# Patient Record
Sex: MALE | Race: WHITE | NOT HISPANIC OR LATINO | Employment: OTHER | URBAN - METROPOLITAN AREA
[De-identification: names, ages, dates, MRNs, and addresses within clinical notes are randomized per-mention and may not be internally consistent; named-entity substitution may affect disease eponyms.]

---

## 2023-09-15 ENCOUNTER — OFFICE VISIT (OUTPATIENT)
Dept: SLEEP MEDICINE | Facility: MEDICAL CENTER | Age: 78
End: 2023-09-15
Attending: INTERNAL MEDICINE
Payer: COMMERCIAL

## 2023-09-15 ENCOUNTER — HOSPITAL ENCOUNTER (OUTPATIENT)
Dept: RADIOLOGY | Facility: MEDICAL CENTER | Age: 78
End: 2023-09-15
Attending: INTERNAL MEDICINE

## 2023-09-15 VITALS
WEIGHT: 128 LBS | SYSTOLIC BLOOD PRESSURE: 108 MMHG | DIASTOLIC BLOOD PRESSURE: 54 MMHG | HEIGHT: 68 IN | OXYGEN SATURATION: 96 % | BODY MASS INDEX: 19.4 KG/M2 | HEART RATE: 60 BPM

## 2023-09-15 DIAGNOSIS — Z87.891 FORMER SMOKER: ICD-10-CM

## 2023-09-15 DIAGNOSIS — R91.1 LUNG NODULE: ICD-10-CM

## 2023-09-15 DIAGNOSIS — J96.11 CHRONIC RESPIRATORY FAILURE WITH HYPOXIA (HCC): ICD-10-CM

## 2023-09-15 PROBLEM — D61.818 PANCYTOPENIA (HCC): Chronic | Status: ACTIVE | Noted: 2023-09-15

## 2023-09-15 PROCEDURE — 3078F DIAST BP <80 MM HG: CPT | Performed by: INTERNAL MEDICINE

## 2023-09-15 PROCEDURE — 99211 OFF/OP EST MAY X REQ PHY/QHP: CPT | Performed by: INTERNAL MEDICINE

## 2023-09-15 PROCEDURE — 3074F SYST BP LT 130 MM HG: CPT | Performed by: INTERNAL MEDICINE

## 2023-09-15 PROCEDURE — 99205 OFFICE O/P NEW HI 60 MIN: CPT | Performed by: INTERNAL MEDICINE

## 2023-09-15 ASSESSMENT — PATIENT HEALTH QUESTIONNAIRE - PHQ9: CLINICAL INTERPRETATION OF PHQ2 SCORE: 0

## 2023-09-15 NOTE — PROGRESS NOTES
Pulmonary Consult    Date of Initial Consult: 9/15/23    Cc/ Reason for consult: Lung nodule, COPD with chronic hypoxic respiratory failure, former smoker, rheumatoid arthritis, h/o prostate cancer     HPI:   King Alegria is a very pleasant 78 y.o. male referred for above.    Patient has not been seen by pulmonary at [facility] in the past.  he has no prior PFTs  he has 0 ED or Urgent Care visits in the past year for respiratory issues.  he has not been hospitalized for respiratory issues.   he has not been intubated for respiratory failure.    He states he had a CT scan because he unintentionally lost 80lbs over 10 months.  He quit smoking 16 years ago.   He denies respiratory complaints.  His CT scan had a small 6 mm nodule Lung-RADS 3. Despite the low risk, a PET/CT was ordered.  PET/CT was negative.       Past Medical History:   Diagnosis Date    Arthritis     Chronic respiratory failure with hypoxia (HCC) 09/15/2023    Uses 2lpm at rest, 3lpm with ambulation and at night    COPD (chronic obstructive pulmonary disease) ([facility])     Former smoker 09/15/2023    Heart attack ([facility])     Heart attack ([facility])     Had stents placed last month    Hyperlipidemia     Hypertension     Lung nodule 09/15/2023    Pancytopenia ([facility]) 09/15/2023    Prostate cancer ([facility]) 2 years ago    Had seeds    Rheumatoid arthritis ([facility]) 12/15/2015       History reviewed. No pertinent surgical history.    Social History     Socioeconomic History    Marital status:      Spouse name: Not on file    Number of children: Not on file    Years of education: Not on file    Highest education level: Not on file   Occupational History    Not on file   Tobacco Use    Smoking status: Former     Current packs/day: 0.00     Average packs/day: 2.0 packs/day for 50.0 years (100.0 ttl pk-yrs)     Types: Cigarettes     Start date: 1963     Quit date: 2013     Years since quittin.7    Smokeless tobacco: Never   Substance and Sexual  Activity    Alcohol use: No     Comment: ETOH abue in the past    Drug use: No    Sexual activity: Not on file   Other Topics Concern    Not on file   Social History Narrative    Not on file     Social Determinants of Health     Financial Resource Strain: Not on file   Food Insecurity: Not on file   Transportation Needs: Not on file   Physical Activity: Not on file   Stress: Not on file   Social Connections: Not on file   Intimate Partner Violence: Not on file   Housing Stability: Not on file          Family History   Problem Relation Age of Onset    Cancer Mother        Current Outpatient Medications on File Prior to Visit   Medication Sig Dispense Refill    sulfaSALAzine (AZULFIDINE) 500 MG Tab Take 500 mg by mouth 4 times a day.      oxybutynin (DITROPAN) 5 MG Tab Take 5 mg by mouth 1 time daily as needed.      hydroxychloroquine (PLAQUENIL) 200 MG Tab Take 1 Tab by mouth 2 times a day. 180 Tab 1    ergocalciferol (DRISDOL) 79149 UNIT capsule Take 1 cap once weekly for 8 weeks. 4 Cap 1    predniSONE (DELTASONE) 5 MG Tab Take 3 tabs daily for 1 week and then 2 tabs daily for 1 week and then 1 tab daily for 1 week 60 Tab 1    folic acid (FOLVITE) 1 MG Tab Take 1 Tab by mouth every day. 30 Tab 3    ALBUTEROL INH Inhale  by mouth.      aspirin EC (ECOTRIN) 81 MG Tablet Delayed Response Take 81 mg by mouth every day.      atenolol (TENORMIN) 50 MG Tab Take 50 mg by mouth every day.      atorvastatin (LIPITOR) 80 MG tablet Take 80 mg by mouth every evening.      budesonide-formoterol (SYMBICORT) 160-4.5 MCG/ACT Aerosol Inhale 2 Puffs by mouth 2 Times a Day.      calcium-vitamin D (OSCAL 500 +D) 500-200 MG-UNIT Tab Take 1 Tab by mouth 2 times a day, with meals.      vitamin D (CHOLECALCIFEROL) 1000 UNIT Tab Take 1,000 Units by mouth every day.      clopidogrel (PLAVIX) 75 MG Tab Take 75 mg by mouth every day.      furosemide (LASIX) 20 MG Tab Take 20 mg by mouth 2 times a day.      isosorbide dinitrate (ISORDIL) 20 MG  "Tab Take 20 mg by mouth 3 times a day.      losartan (COZAAR) 50 MG Tab Take 50 mg by mouth every day.      tamsulosin (FLOMAX) 0.4 MG capsule Take 0.4 mg by mouth ONE-HALF HOUR AFTER BREAKFAST.       No current facility-administered medications on file prior to visit.       Allergies: Patient has no known allergies.      ROS: A 12 point ROS was performed on intake and during my interview. ROS negative unless specifically noted in HPI.     Vitals:  /54 (BP Location: Right arm, Patient Position: Sitting, BP Cuff Size: Adult)   Pulse 60   Ht 1.727 m (5' 8\")   Wt 58.1 kg (128 lb)   SpO2 96%     Wt Readings from Last 3 Encounters:   07/27/16 95.3 kg (210 lb)   04/19/16 97.5 kg (215 lb)   01/26/16 98 kg (216 lb)       Physical Exam:  Physical Exam  Vitals reviewed.   Constitutional:       General: He is not in acute distress.     Appearance: He is normal weight. He is not ill-appearing, toxic-appearing or diaphoretic.   Eyes:      General:         Right eye: No discharge.         Left eye: No discharge.      Conjunctiva/sclera: Conjunctivae normal.   Cardiovascular:      Rate and Rhythm: Normal rate and regular rhythm.   Pulmonary:      Effort: Pulmonary effort is normal.      Breath sounds: Normal breath sounds.   Musculoskeletal:      Right lower leg: No edema.      Left lower leg: No edema.   Skin:     General: Skin is warm.      Capillary Refill: Capillary refill takes less than 2 seconds.      Coloration: Skin is not jaundiced.   Neurological:      General: No focal deficit present.      Mental Status: He is alert.   Psychiatric:         Behavior: Behavior normal.         Laboratory Data: I personally reviewed labs including historical lab trends.       There is no immunization history on file for this patient.      PFTs as reviewed by me personally show: none available for review.     Imaging as reviewed by me personally show:  April 2023:      PET/CT August 2023:         Assessment/Plan:    Pulmonary " problem list:  #Lung nodule - low risk   #Tobacco abuse - remote; in remission  #Pancytopenia  #Weight loss    Discussion: This is a low risk nodule that does not require biopsy.  PET/CT is reassuring and that no other sites are involved.  Nodule is not visible on PET/CT plain CT images. No further f/u for the lung nodule is needed. He should continue [facility] until age 82 [facility]. He should also continue to have his pancytopenia and nonspecific bone marrow changes on PET/CT worked up by primary care or hematology oncology.     Total consult time was 60 minutes. This includes reviewing previous provider notes, personally reviewing previous imaging and spirometry, educating the patient about their disease process, and inhaler education.   __________  Jaime Wells, DO  Pulmonary and Critical Care Medicine  [facility]    Please note that this dictation was created using voice recognition software. The accuracy of the dictation is limited to the abilities of the software. I have made every reasonable attempt to correct obvious errors, but I expect that there are errors of grammar and possibly content that I did not discover before finalizing the note.

## 2023-11-08 ENCOUNTER — HOSPITAL ENCOUNTER (INPATIENT)
Facility: MEDICAL CENTER | Age: 78
LOS: 5 days | DRG: 121 | End: 2023-11-13
Attending: EMERGENCY MEDICINE | Admitting: INTERNAL MEDICINE
Payer: COMMERCIAL

## 2023-11-08 ENCOUNTER — APPOINTMENT (OUTPATIENT)
Dept: RADIOLOGY | Facility: MEDICAL CENTER | Age: 78
DRG: 121 | End: 2023-11-08
Attending: EMERGENCY MEDICINE
Payer: COMMERCIAL

## 2023-11-08 DIAGNOSIS — E07.9 THYROID ORBITOPATHY: ICD-10-CM

## 2023-11-08 DIAGNOSIS — H05.89 THYROID ORBITOPATHY: ICD-10-CM

## 2023-11-08 DIAGNOSIS — H05.123 ORBITAL MYOSITIS OF BOTH SIDES: ICD-10-CM

## 2023-11-08 DIAGNOSIS — H05.012 ORBITAL CELLULITIS ON LEFT: ICD-10-CM

## 2023-11-08 PROBLEM — H05.019 ORBITAL CELLULITIS: Status: ACTIVE | Noted: 2023-11-08

## 2023-11-08 LAB
ALBUMIN SERPL BCP-MCNC: 3.2 G/DL (ref 3.2–4.9)
ALBUMIN/GLOB SERPL: 0.7 G/DL
ALP SERPL-CCNC: 81 U/L (ref 30–99)
ALT SERPL-CCNC: 19 U/L (ref 2–50)
ANION GAP SERPL CALC-SCNC: 10 MMOL/L (ref 7–16)
ANISOCYTOSIS BLD QL SMEAR: ABNORMAL
AST SERPL-CCNC: 17 U/L (ref 12–45)
BASOPHILS # BLD AUTO: 0.2 % (ref 0–1.8)
BASOPHILS # BLD: 0.01 K/UL (ref 0–0.12)
BILIRUB SERPL-MCNC: 0.5 MG/DL (ref 0.1–1.5)
BUN SERPL-MCNC: 13 MG/DL (ref 8–22)
CALCIUM ALBUM COR SERPL-MCNC: 10 MG/DL (ref 8.5–10.5)
CALCIUM SERPL-MCNC: 9.4 MG/DL (ref 8.5–10.5)
CHLORIDE SERPL-SCNC: 98 MMOL/L (ref 96–112)
CK SERPL-CCNC: 39 U/L (ref 0–154)
CO2 SERPL-SCNC: 25 MMOL/L (ref 20–33)
COMMENT 1642: NORMAL
CREAT SERPL-MCNC: 0.63 MG/DL (ref 0.5–1.4)
CRP SERPL HS-MCNC: 5.08 MG/DL (ref 0–0.75)
EOSINOPHIL # BLD AUTO: 0 K/UL (ref 0–0.51)
EOSINOPHIL NFR BLD: 0 % (ref 0–6.9)
ERYTHROCYTE [DISTWIDTH] IN BLOOD BY AUTOMATED COUNT: 77.9 FL (ref 35.9–50)
ERYTHROCYTE [SEDIMENTATION RATE] IN BLOOD BY WESTERGREN METHOD: 91 MM/HOUR (ref 0–20)
GFR SERPLBLD CREATININE-BSD FMLA CKD-EPI: 98 ML/MIN/1.73 M 2
GLOBULIN SER CALC-MCNC: 4.8 G/DL (ref 1.9–3.5)
GLUCOSE SERPL-MCNC: 98 MG/DL (ref 65–99)
HCT VFR BLD AUTO: 24.9 % (ref 42–52)
HGB BLD-MCNC: 8.1 G/DL (ref 14–18)
IMM GRANULOCYTES # BLD AUTO: 0.06 K/UL (ref 0–0.11)
IMM GRANULOCYTES NFR BLD AUTO: 1.1 % (ref 0–0.9)
LACTATE SERPL-SCNC: 0.8 MMOL/L (ref 0.5–2)
LACTATE SERPL-SCNC: 1.9 MMOL/L (ref 0.5–2)
LYMPHOCYTES # BLD AUTO: 0.27 K/UL (ref 1–4.8)
LYMPHOCYTES NFR BLD: 4.9 % (ref 22–41)
MACROCYTES BLD QL SMEAR: ABNORMAL
MAGNESIUM SERPL-MCNC: 2 MG/DL (ref 1.5–2.5)
MCH RBC QN AUTO: 36 PG (ref 27–33)
MCHC RBC AUTO-ENTMCNC: 32.5 G/DL (ref 32.3–36.5)
MCV RBC AUTO: 110.7 FL (ref 81.4–97.8)
MONOCYTES # BLD AUTO: 0.47 K/UL (ref 0–0.85)
MONOCYTES NFR BLD AUTO: 8.6 % (ref 0–13.4)
MORPHOLOGY BLD-IMP: NORMAL
NEUTROPHILS # BLD AUTO: 4.68 K/UL (ref 1.82–7.42)
NEUTROPHILS NFR BLD: 85.2 % (ref 44–72)
NRBC # BLD AUTO: 0 K/UL
NRBC BLD-RTO: 0 /100 WBC (ref 0–0.2)
OVALOCYTES BLD QL SMEAR: NORMAL
PHOSPHATE SERPL-MCNC: 3 MG/DL (ref 2.5–4.5)
PLATELET # BLD AUTO: 109 K/UL (ref 164–446)
PLATELET BLD QL SMEAR: NORMAL
PMV BLD AUTO: 9.6 FL (ref 9–12.9)
POIKILOCYTOSIS BLD QL SMEAR: NORMAL
POTASSIUM SERPL-SCNC: 3.7 MMOL/L (ref 3.6–5.5)
PROT SERPL-MCNC: 8 G/DL (ref 6–8.2)
RBC # BLD AUTO: 2.25 M/UL (ref 4.7–6.1)
RBC BLD AUTO: PRESENT
SCCMEC + MECA PNL NOSE NAA+PROBE: NEGATIVE
SODIUM SERPL-SCNC: 133 MMOL/L (ref 135–145)
T3FREE SERPL-MCNC: 1.02 PG/ML (ref 2–4.4)
T4 FREE SERPL-MCNC: 0.63 NG/DL (ref 0.93–1.7)
TSH SERPL DL<=0.005 MIU/L-ACNC: 57.2 UIU/ML (ref 0.38–5.33)
WBC # BLD AUTO: 5.5 K/UL (ref 4.8–10.8)

## 2023-11-08 PROCEDURE — 84439 ASSAY OF FREE THYROXINE: CPT

## 2023-11-08 PROCEDURE — 87040 BLOOD CULTURE FOR BACTERIA: CPT | Mod: 91

## 2023-11-08 PROCEDURE — 80053 COMPREHEN METABOLIC PANEL: CPT

## 2023-11-08 PROCEDURE — A9270 NON-COVERED ITEM OR SERVICE: HCPCS | Performed by: INTERNAL MEDICINE

## 2023-11-08 PROCEDURE — 85025 COMPLETE CBC W/AUTO DIFF WBC: CPT

## 2023-11-08 PROCEDURE — 700117 HCHG RX CONTRAST REV CODE 255: Performed by: EMERGENCY MEDICINE

## 2023-11-08 PROCEDURE — 96366 THER/PROPH/DIAG IV INF ADDON: CPT

## 2023-11-08 PROCEDURE — 700102 HCHG RX REV CODE 250 W/ 637 OVERRIDE(OP): Performed by: INTERNAL MEDICINE

## 2023-11-08 PROCEDURE — 770006 HCHG ROOM/CARE - MED/SURG/GYN SEMI*

## 2023-11-08 PROCEDURE — 99285 EMERGENCY DEPT VISIT HI MDM: CPT

## 2023-11-08 PROCEDURE — 70481 CT ORBIT/EAR/FOSSA W/DYE: CPT

## 2023-11-08 PROCEDURE — 87641 MR-STAPH DNA AMP PROBE: CPT

## 2023-11-08 PROCEDURE — 99223 1ST HOSP IP/OBS HIGH 75: CPT | Mod: AI | Performed by: INTERNAL MEDICINE

## 2023-11-08 PROCEDURE — 83735 ASSAY OF MAGNESIUM: CPT

## 2023-11-08 PROCEDURE — 36415 COLL VENOUS BLD VENIPUNCTURE: CPT

## 2023-11-08 PROCEDURE — 85652 RBC SED RATE AUTOMATED: CPT

## 2023-11-08 PROCEDURE — 70470 CT HEAD/BRAIN W/O & W/DYE: CPT

## 2023-11-08 PROCEDURE — 82550 ASSAY OF CK (CPK): CPT

## 2023-11-08 PROCEDURE — 96365 THER/PROPH/DIAG IV INF INIT: CPT

## 2023-11-08 PROCEDURE — 96375 TX/PRO/DX INJ NEW DRUG ADDON: CPT

## 2023-11-08 PROCEDURE — 84100 ASSAY OF PHOSPHORUS: CPT

## 2023-11-08 PROCEDURE — 83605 ASSAY OF LACTIC ACID: CPT | Mod: 91

## 2023-11-08 PROCEDURE — 84481 FREE ASSAY (FT-3): CPT

## 2023-11-08 PROCEDURE — 700111 HCHG RX REV CODE 636 W/ 250 OVERRIDE (IP): Mod: JZ | Performed by: INTERNAL MEDICINE

## 2023-11-08 PROCEDURE — 700105 HCHG RX REV CODE 258: Performed by: EMERGENCY MEDICINE

## 2023-11-08 PROCEDURE — 700101 HCHG RX REV CODE 250: Performed by: EMERGENCY MEDICINE

## 2023-11-08 PROCEDURE — 86140 C-REACTIVE PROTEIN: CPT

## 2023-11-08 PROCEDURE — 84443 ASSAY THYROID STIM HORMONE: CPT

## 2023-11-08 PROCEDURE — 700111 HCHG RX REV CODE 636 W/ 250 OVERRIDE (IP): Mod: JZ | Performed by: EMERGENCY MEDICINE

## 2023-11-08 RX ORDER — OXYBUTYNIN CHLORIDE 5 MG/1
5 TABLET ORAL
Status: DISCONTINUED | OUTPATIENT
Start: 2023-11-08 | End: 2023-11-13 | Stop reason: HOSPADM

## 2023-11-08 RX ORDER — LANOLIN ALCOHOL/MO/W.PET/CERES
1000 CREAM (GRAM) TOPICAL EVERY MORNING
COMMUNITY
End: 2023-12-27

## 2023-11-08 RX ORDER — MORPHINE SULFATE 4 MG/ML
4 INJECTION INTRAVENOUS
Status: DISCONTINUED | OUTPATIENT
Start: 2023-11-08 | End: 2023-11-08

## 2023-11-08 RX ORDER — AMOXICILLIN 250 MG
2 CAPSULE ORAL 2 TIMES DAILY
Status: DISCONTINUED | OUTPATIENT
Start: 2023-11-09 | End: 2023-11-13 | Stop reason: HOSPADM

## 2023-11-08 RX ORDER — ATORVASTATIN CALCIUM 40 MG/1
80 TABLET, FILM COATED ORAL NIGHTLY
Status: DISCONTINUED | OUTPATIENT
Start: 2023-11-08 | End: 2023-11-13 | Stop reason: HOSPADM

## 2023-11-08 RX ORDER — CEFEPIME HYDROCHLORIDE 2 G/1
2 INJECTION, POWDER, FOR SOLUTION INTRAVENOUS ONCE
Status: COMPLETED | OUTPATIENT
Start: 2023-11-08 | End: 2023-11-08

## 2023-11-08 RX ORDER — ISOSORBIDE MONONITRATE 60 MG/1
60 TABLET, EXTENDED RELEASE ORAL EVERY MORNING
COMMUNITY

## 2023-11-08 RX ORDER — POTASSIUM CHLORIDE 20 MEQ/1
20 TABLET, EXTENDED RELEASE ORAL 2 TIMES DAILY
COMMUNITY

## 2023-11-08 RX ORDER — CEFTRIAXONE 1 G/1
1000 INJECTION, POWDER, FOR SOLUTION INTRAMUSCULAR; INTRAVENOUS ONCE
Status: DISCONTINUED | OUTPATIENT
Start: 2023-11-08 | End: 2023-11-08

## 2023-11-08 RX ORDER — CETIRIZINE HYDROCHLORIDE 10 MG/1
10 TABLET ORAL EVERY MORNING
Status: DISCONTINUED | OUTPATIENT
Start: 2023-11-09 | End: 2023-11-13 | Stop reason: HOSPADM

## 2023-11-08 RX ORDER — LEVOTHYROXINE SODIUM 0.12 MG/1
125 TABLET ORAL
Status: ON HOLD | COMMUNITY
End: 2023-11-13

## 2023-11-08 RX ORDER — ATENOLOL 25 MG/1
12.5 TABLET ORAL
Status: DISCONTINUED | OUTPATIENT
Start: 2023-11-09 | End: 2023-11-13 | Stop reason: HOSPADM

## 2023-11-08 RX ORDER — DOCUSATE CALCIUM 240 MG
240 CAPSULE ORAL
COMMUNITY

## 2023-11-08 RX ORDER — VITAMIN B COMPLEX
1000 TABLET ORAL DAILY
Status: DISCONTINUED | OUTPATIENT
Start: 2023-11-09 | End: 2023-11-13 | Stop reason: HOSPADM

## 2023-11-08 RX ORDER — ISOSORBIDE MONONITRATE 30 MG/1
60 TABLET, EXTENDED RELEASE ORAL EVERY MORNING
Status: DISCONTINUED | OUTPATIENT
Start: 2023-11-09 | End: 2023-11-13 | Stop reason: HOSPADM

## 2023-11-08 RX ORDER — LABETALOL HYDROCHLORIDE 5 MG/ML
10 INJECTION, SOLUTION INTRAVENOUS EVERY 4 HOURS PRN
Status: DISCONTINUED | OUTPATIENT
Start: 2023-11-08 | End: 2023-11-13 | Stop reason: HOSPADM

## 2023-11-08 RX ORDER — HYDROMORPHONE HYDROCHLORIDE 1 MG/ML
1 INJECTION, SOLUTION INTRAMUSCULAR; INTRAVENOUS; SUBCUTANEOUS
Status: DISCONTINUED | OUTPATIENT
Start: 2023-11-08 | End: 2023-11-09

## 2023-11-08 RX ORDER — ONDANSETRON 2 MG/ML
4 INJECTION INTRAMUSCULAR; INTRAVENOUS EVERY 4 HOURS PRN
Status: DISCONTINUED | OUTPATIENT
Start: 2023-11-08 | End: 2023-11-13 | Stop reason: HOSPADM

## 2023-11-08 RX ORDER — TAMSULOSIN HYDROCHLORIDE 0.4 MG/1
0.4 CAPSULE ORAL EVERY MORNING
Status: DISCONTINUED | OUTPATIENT
Start: 2023-11-09 | End: 2023-11-13 | Stop reason: HOSPADM

## 2023-11-08 RX ORDER — ASCORBIC ACID 500 MG
500 TABLET ORAL EVERY MORNING
Status: DISCONTINUED | OUTPATIENT
Start: 2023-11-09 | End: 2023-11-13 | Stop reason: HOSPADM

## 2023-11-08 RX ORDER — PROPARACAINE HYDROCHLORIDE 5 MG/ML
1 SOLUTION/ DROPS OPHTHALMIC ONCE
Status: COMPLETED | OUTPATIENT
Start: 2023-11-08 | End: 2023-11-08

## 2023-11-08 RX ORDER — FERROUS SULFATE 325(65) MG
325 TABLET ORAL
COMMUNITY
End: 2023-12-27

## 2023-11-08 RX ORDER — ASCORBIC ACID 500 MG
500 TABLET ORAL EVERY MORNING
COMMUNITY
End: 2023-12-27

## 2023-11-08 RX ORDER — POTASSIUM CHLORIDE 20 MEQ/1
20 TABLET, EXTENDED RELEASE ORAL DAILY
Status: DISCONTINUED | OUTPATIENT
Start: 2023-11-09 | End: 2023-11-13 | Stop reason: HOSPADM

## 2023-11-08 RX ORDER — ACETAMINOPHEN 325 MG/1
650 TABLET ORAL EVERY 6 HOURS PRN
Status: DISCONTINUED | OUTPATIENT
Start: 2023-11-08 | End: 2023-11-13 | Stop reason: HOSPADM

## 2023-11-08 RX ORDER — ENOXAPARIN SODIUM 100 MG/ML
40 INJECTION SUBCUTANEOUS DAILY
Status: DISCONTINUED | OUTPATIENT
Start: 2023-11-08 | End: 2023-11-13 | Stop reason: HOSPADM

## 2023-11-08 RX ORDER — BISACODYL 10 MG
10 SUPPOSITORY, RECTAL RECTAL
Status: DISCONTINUED | OUTPATIENT
Start: 2023-11-08 | End: 2023-11-13 | Stop reason: HOSPADM

## 2023-11-08 RX ORDER — POLYETHYLENE GLYCOL 3350 17 G/17G
1 POWDER, FOR SOLUTION ORAL
Status: DISCONTINUED | OUTPATIENT
Start: 2023-11-08 | End: 2023-11-13 | Stop reason: HOSPADM

## 2023-11-08 RX ORDER — LEVOTHYROXINE SODIUM 0.15 MG/1
150 TABLET ORAL
Status: DISCONTINUED | OUTPATIENT
Start: 2023-11-09 | End: 2023-11-13 | Stop reason: HOSPADM

## 2023-11-08 RX ORDER — PYRIDOXINE HCL (VITAMIN B6) 50 MG
50 TABLET ORAL EVERY MORNING
COMMUNITY
End: 2023-12-27

## 2023-11-08 RX ORDER — MORPHINE SULFATE 4 MG/ML
4 INJECTION INTRAVENOUS
Status: DISCONTINUED | OUTPATIENT
Start: 2023-11-08 | End: 2023-11-09

## 2023-11-08 RX ORDER — ONDANSETRON 4 MG/1
4 TABLET, ORALLY DISINTEGRATING ORAL EVERY 4 HOURS PRN
Status: DISCONTINUED | OUTPATIENT
Start: 2023-11-08 | End: 2023-11-13 | Stop reason: HOSPADM

## 2023-11-08 RX ORDER — PYRIDOXINE HCL (VITAMIN B6) 50 MG
50 TABLET ORAL EVERY MORNING
Status: DISCONTINUED | OUTPATIENT
Start: 2023-11-09 | End: 2023-11-13 | Stop reason: HOSPADM

## 2023-11-08 RX ORDER — OXYCODONE HYDROCHLORIDE 10 MG/1
10 TABLET ORAL
Status: DISCONTINUED | OUTPATIENT
Start: 2023-11-08 | End: 2023-11-09

## 2023-11-08 RX ORDER — CHOLECALCIFEROL (VITAMIN D3) 125 MCG
1000 CAPSULE ORAL EVERY MORNING
Status: DISCONTINUED | OUTPATIENT
Start: 2023-11-09 | End: 2023-11-13 | Stop reason: HOSPADM

## 2023-11-08 RX ORDER — ASPIRIN 81 MG/1
81 TABLET ORAL
Status: DISCONTINUED | OUTPATIENT
Start: 2023-11-08 | End: 2023-11-13 | Stop reason: HOSPADM

## 2023-11-08 RX ORDER — FERROUS SULFATE 325(65) MG
325 TABLET ORAL
Status: DISCONTINUED | OUTPATIENT
Start: 2023-11-08 | End: 2023-11-13 | Stop reason: HOSPADM

## 2023-11-08 RX ORDER — OXYCODONE HYDROCHLORIDE 5 MG/1
5 TABLET ORAL
Status: DISCONTINUED | OUTPATIENT
Start: 2023-11-08 | End: 2023-11-11

## 2023-11-08 RX ORDER — SODIUM CHLORIDE, SODIUM LACTATE, POTASSIUM CHLORIDE, AND CALCIUM CHLORIDE .6; .31; .03; .02 G/100ML; G/100ML; G/100ML; G/100ML
30 INJECTION, SOLUTION INTRAVENOUS ONCE
Status: COMPLETED | OUTPATIENT
Start: 2023-11-08 | End: 2023-11-08

## 2023-11-08 RX ORDER — CETIRIZINE HYDROCHLORIDE 10 MG/1
10 TABLET ORAL EVERY MORNING
COMMUNITY

## 2023-11-08 RX ADMIN — VANCOMYCIN HYDROCHLORIDE 1500 MG: 5 INJECTION, POWDER, LYOPHILIZED, FOR SOLUTION INTRAVENOUS at 20:27

## 2023-11-08 RX ADMIN — CEFEPIME 2 G: 2 INJECTION, POWDER, FOR SOLUTION INTRAVENOUS at 20:14

## 2023-11-08 RX ADMIN — SODIUM CHLORIDE, POTASSIUM CHLORIDE, SODIUM LACTATE AND CALCIUM CHLORIDE 1716 ML: 600; 310; 30; 20 INJECTION, SOLUTION INTRAVENOUS at 19:11

## 2023-11-08 RX ADMIN — PROPARACAINE HYDROCHLORIDE 1 DROP: 5 SOLUTION/ DROPS OPHTHALMIC at 23:08

## 2023-11-08 RX ADMIN — ASPIRIN 81 MG: 81 TABLET, COATED ORAL at 23:49

## 2023-11-08 RX ADMIN — ENOXAPARIN SODIUM 40 MG: 100 INJECTION SUBCUTANEOUS at 23:49

## 2023-11-08 RX ADMIN — IOHEXOL 80 ML: 350 INJECTION, SOLUTION INTRAVENOUS at 21:15

## 2023-11-08 RX ADMIN — MORPHINE SULFATE 4 MG: 4 INJECTION, SOLUTION INTRAMUSCULAR; INTRAVENOUS at 19:11

## 2023-11-09 ENCOUNTER — APPOINTMENT (OUTPATIENT)
Dept: RADIOLOGY | Facility: MEDICAL CENTER | Age: 78
DRG: 121 | End: 2023-11-09
Attending: STUDENT IN AN ORGANIZED HEALTH CARE EDUCATION/TRAINING PROGRAM
Payer: COMMERCIAL

## 2023-11-09 PROBLEM — D61.9 APLASTIC ANEMIA (HCC): Status: ACTIVE | Noted: 2023-11-09

## 2023-11-09 PROBLEM — D69.6 THROMBOCYTOPENIA (HCC): Status: ACTIVE | Noted: 2023-11-09

## 2023-11-09 PROBLEM — D64.9 CHRONIC ANEMIA: Status: ACTIVE | Noted: 2023-11-09

## 2023-11-09 PROBLEM — H05.89 THYROID ORBITOPATHY: Status: ACTIVE | Noted: 2023-11-09

## 2023-11-09 PROBLEM — E07.9 THYROID ORBITOPATHY: Status: ACTIVE | Noted: 2023-11-09

## 2023-11-09 PROBLEM — E03.9 HYPOTHYROIDISM: Status: ACTIVE | Noted: 2023-11-09

## 2023-11-09 LAB
ALBUMIN SERPL BCP-MCNC: 3.1 G/DL (ref 3.2–4.9)
ALBUMIN/GLOB SERPL: 0.8 G/DL
ALP SERPL-CCNC: 81 U/L (ref 30–99)
ALT SERPL-CCNC: 15 U/L (ref 2–50)
ANION GAP SERPL CALC-SCNC: 8 MMOL/L (ref 7–16)
AST SERPL-CCNC: 13 U/L (ref 12–45)
BASOPHILS # BLD AUTO: 0.2 % (ref 0–1.8)
BASOPHILS # BLD: 0.01 K/UL (ref 0–0.12)
BILIRUB SERPL-MCNC: 0.6 MG/DL (ref 0.1–1.5)
BUN SERPL-MCNC: 10 MG/DL (ref 8–22)
CALCIUM ALBUM COR SERPL-MCNC: 9.4 MG/DL (ref 8.5–10.5)
CALCIUM SERPL-MCNC: 8.7 MG/DL (ref 8.5–10.5)
CHLORIDE SERPL-SCNC: 102 MMOL/L (ref 96–112)
CO2 SERPL-SCNC: 26 MMOL/L (ref 20–33)
CREAT SERPL-MCNC: 0.58 MG/DL (ref 0.5–1.4)
EOSINOPHIL # BLD AUTO: 0.01 K/UL (ref 0–0.51)
EOSINOPHIL NFR BLD: 0.2 % (ref 0–6.9)
ERYTHROCYTE [DISTWIDTH] IN BLOOD BY AUTOMATED COUNT: 77.1 FL (ref 35.9–50)
GFR SERPLBLD CREATININE-BSD FMLA CKD-EPI: 101 ML/MIN/1.73 M 2
GLOBULIN SER CALC-MCNC: 4 G/DL (ref 1.9–3.5)
GLUCOSE SERPL-MCNC: 90 MG/DL (ref 65–99)
HCT VFR BLD AUTO: 25 % (ref 42–52)
HGB BLD-MCNC: 8 G/DL (ref 14–18)
IMM GRANULOCYTES # BLD AUTO: 0.06 K/UL (ref 0–0.11)
IMM GRANULOCYTES NFR BLD AUTO: 1.1 % (ref 0–0.9)
LYMPHOCYTES # BLD AUTO: 0.25 K/UL (ref 1–4.8)
LYMPHOCYTES NFR BLD: 4.7 % (ref 22–41)
MCH RBC QN AUTO: 35.6 PG (ref 27–33)
MCHC RBC AUTO-ENTMCNC: 32 G/DL (ref 32.3–36.5)
MCV RBC AUTO: 111.1 FL (ref 81.4–97.8)
MONOCYTES # BLD AUTO: 0.48 K/UL (ref 0–0.85)
MONOCYTES NFR BLD AUTO: 9 % (ref 0–13.4)
NEUTROPHILS # BLD AUTO: 4.53 K/UL (ref 1.82–7.42)
NEUTROPHILS NFR BLD: 84.8 % (ref 44–72)
NRBC # BLD AUTO: 0 K/UL
NRBC BLD-RTO: 0 /100 WBC (ref 0–0.2)
PLATELET # BLD AUTO: 118 K/UL (ref 164–446)
PLATELETS.RETICULATED NFR BLD AUTO: 1.8 % (ref 0.6–13.1)
PMV BLD AUTO: 9.6 FL (ref 9–12.9)
POTASSIUM SERPL-SCNC: 3.6 MMOL/L (ref 3.6–5.5)
PROT SERPL-MCNC: 7.1 G/DL (ref 6–8.2)
RBC # BLD AUTO: 2.25 M/UL (ref 4.7–6.1)
SODIUM SERPL-SCNC: 136 MMOL/L (ref 135–145)
THYROPEROXIDASE AB SERPL-ACNC: 23 IU/ML (ref 0–9)
WBC # BLD AUTO: 5.3 K/UL (ref 4.8–10.8)

## 2023-11-09 PROCEDURE — 99223 1ST HOSP IP/OBS HIGH 75: CPT | Performed by: OPHTHALMOLOGY

## 2023-11-09 PROCEDURE — 99233 SBSQ HOSP IP/OBS HIGH 50: CPT | Performed by: STUDENT IN AN ORGANIZED HEALTH CARE EDUCATION/TRAINING PROGRAM

## 2023-11-09 PROCEDURE — 700105 HCHG RX REV CODE 258: Performed by: STUDENT IN AN ORGANIZED HEALTH CARE EDUCATION/TRAINING PROGRAM

## 2023-11-09 PROCEDURE — 700111 HCHG RX REV CODE 636 W/ 250 OVERRIDE (IP): Performed by: STUDENT IN AN ORGANIZED HEALTH CARE EDUCATION/TRAINING PROGRAM

## 2023-11-09 PROCEDURE — 85025 COMPLETE CBC W/AUTO DIFF WBC: CPT

## 2023-11-09 PROCEDURE — 36415 COLL VENOUS BLD VENIPUNCTURE: CPT

## 2023-11-09 PROCEDURE — 770006 HCHG ROOM/CARE - MED/SURG/GYN SEMI*

## 2023-11-09 PROCEDURE — 700102 HCHG RX REV CODE 250 W/ 637 OVERRIDE(OP): Performed by: INTERNAL MEDICINE

## 2023-11-09 PROCEDURE — A9579 GAD-BASE MR CONTRAST NOS,1ML: HCPCS | Performed by: STUDENT IN AN ORGANIZED HEALTH CARE EDUCATION/TRAINING PROGRAM

## 2023-11-09 PROCEDURE — 700105 HCHG RX REV CODE 258: Performed by: INTERNAL MEDICINE

## 2023-11-09 PROCEDURE — 83520 IMMUNOASSAY QUANT NOS NONAB: CPT

## 2023-11-09 PROCEDURE — 80053 COMPREHEN METABOLIC PANEL: CPT

## 2023-11-09 PROCEDURE — 700111 HCHG RX REV CODE 636 W/ 250 OVERRIDE (IP): Performed by: INTERNAL MEDICINE

## 2023-11-09 PROCEDURE — A9270 NON-COVERED ITEM OR SERVICE: HCPCS | Performed by: INTERNAL MEDICINE

## 2023-11-09 PROCEDURE — 85055 RETICULATED PLATELET ASSAY: CPT

## 2023-11-09 PROCEDURE — 70543 MRI ORBT/FAC/NCK W/O &W/DYE: CPT

## 2023-11-09 PROCEDURE — 700117 HCHG RX CONTRAST REV CODE 255: Performed by: STUDENT IN AN ORGANIZED HEALTH CARE EDUCATION/TRAINING PROGRAM

## 2023-11-09 PROCEDURE — 86376 MICROSOMAL ANTIBODY EACH: CPT

## 2023-11-09 RX ADMIN — Medication 500 MG: at 06:08

## 2023-11-09 RX ADMIN — FERROUS SULFATE TAB 325 MG (65 MG ELEMENTAL FE) 325 MG: 325 (65 FE) TAB at 00:37

## 2023-11-09 RX ADMIN — CYANOCOBALAMIN TAB 500 MCG 1000 MCG: 500 TAB at 06:08

## 2023-11-09 RX ADMIN — ATORVASTATIN CALCIUM 80 MG: 80 TABLET, FILM COATED ORAL at 00:36

## 2023-11-09 RX ADMIN — ASPIRIN 81 MG: 81 TABLET, COATED ORAL at 20:43

## 2023-11-09 RX ADMIN — CETIRIZINE HYDROCHLORIDE 10 MG: 10 TABLET, FILM COATED ORAL at 06:08

## 2023-11-09 RX ADMIN — CEFTRIAXONE SODIUM 2000 MG: 10 INJECTION, POWDER, FOR SOLUTION INTRAVENOUS at 06:36

## 2023-11-09 RX ADMIN — LEVOTHYROXINE SODIUM 150 MCG: 0.15 TABLET ORAL at 06:08

## 2023-11-09 RX ADMIN — ENOXAPARIN SODIUM 40 MG: 100 INJECTION SUBCUTANEOUS at 18:48

## 2023-11-09 RX ADMIN — METHYLPREDNISOLONE SODIUM SUCCINATE 1000 MG: 1 INJECTION, POWDER, LYOPHILIZED, FOR SOLUTION INTRAMUSCULAR; INTRAVENOUS at 15:43

## 2023-11-09 RX ADMIN — GADOTERIDOL 10 ML: 279.3 INJECTION, SOLUTION INTRAVENOUS at 22:24

## 2023-11-09 RX ADMIN — OXYCODONE HYDROCHLORIDE 10 MG: 10 TABLET ORAL at 02:48

## 2023-11-09 RX ADMIN — ATENOLOL 12.5 MG: 25 TABLET ORAL at 06:07

## 2023-11-09 RX ADMIN — VANCOMYCIN HYDROCHLORIDE 1000 MG: 5 INJECTION, POWDER, LYOPHILIZED, FOR SOLUTION INTRAVENOUS at 23:07

## 2023-11-09 RX ADMIN — OXYBUTYNIN CHLORIDE 5 MG: 5 TABLET ORAL at 20:43

## 2023-11-09 RX ADMIN — OXYBUTYNIN CHLORIDE 5 MG: 5 TABLET ORAL at 00:37

## 2023-11-09 RX ADMIN — ATORVASTATIN CALCIUM 80 MG: 80 TABLET, FILM COATED ORAL at 20:43

## 2023-11-09 RX ADMIN — TAMSULOSIN HYDROCHLORIDE 0.4 MG: 0.4 CAPSULE ORAL at 06:08

## 2023-11-09 RX ADMIN — PYRIDOXINE HCL TAB 50 MG 50 MG: 50 TAB at 06:08

## 2023-11-09 RX ADMIN — Medication 1000 UNITS: at 06:07

## 2023-11-09 RX ADMIN — POTASSIUM CHLORIDE 20 MEQ: 1500 TABLET, EXTENDED RELEASE ORAL at 06:08

## 2023-11-09 RX ADMIN — ISOSORBIDE MONONITRATE 60 MG: 30 TABLET, EXTENDED RELEASE ORAL at 06:07

## 2023-11-09 RX ADMIN — DOCUSATE SODIUM 50 MG AND SENNOSIDES 8.6 MG 2 TABLET: 8.6; 5 TABLET, FILM COATED ORAL at 18:48

## 2023-11-09 RX ADMIN — FERROUS SULFATE TAB 325 MG (65 MG ELEMENTAL FE) 325 MG: 325 (65 FE) TAB at 20:43

## 2023-11-09 ASSESSMENT — ENCOUNTER SYMPTOMS
CONSTITUTIONAL NEGATIVE: 1
BRUISES/BLEEDS EASILY: 0
EYE DISCHARGE: 1
EYE REDNESS: 1
MUSCULOSKELETAL NEGATIVE: 1
DOUBLE VISION: 0
WEIGHT LOSS: 0
HEMOPTYSIS: 0
RESPIRATORY NEGATIVE: 1
SPUTUM PRODUCTION: 0
ORTHOPNEA: 0
FLANK PAIN: 0
FOCAL WEAKNESS: 0
NERVOUS/ANXIOUS: 0
PALPITATIONS: 0
NEUROLOGICAL NEGATIVE: 1
NAUSEA: 0
BLURRED VISION: 1
CARDIOVASCULAR NEGATIVE: 1
DOUBLE VISION: 1
HEADACHES: 0
NECK PAIN: 0
EYE PAIN: 1
SPEECH CHANGE: 0
POLYDIPSIA: 0
BACK PAIN: 0
GASTROINTESTINAL NEGATIVE: 1
HEARTBURN: 0
FEVER: 0
BLURRED VISION: 0
COUGH: 0
PHOTOPHOBIA: 0
CHILLS: 0
HALLUCINATIONS: 0
PSYCHIATRIC NEGATIVE: 1
VOMITING: 0
TREMORS: 0
COUGH: 1

## 2023-11-09 ASSESSMENT — LIFESTYLE VARIABLES
CONSUMPTION TOTAL: NEGATIVE
AVERAGE NUMBER OF DAYS PER WEEK YOU HAVE A DRINK CONTAINING ALCOHOL: 0
ALCOHOL_USE: NO
TOTAL SCORE: 0
EVER HAD A DRINK FIRST THING IN THE MORNING TO STEADY YOUR NERVES TO GET RID OF A HANGOVER: NO
TOTAL SCORE: 0
ON A TYPICAL DAY WHEN YOU DRINK ALCOHOL HOW MANY DRINKS DO YOU HAVE: 0
HOW MANY TIMES IN THE PAST YEAR HAVE YOU HAD 5 OR MORE DRINKS IN A DAY: 0
TOTAL SCORE: 0
HAVE YOU EVER FELT YOU SHOULD CUT DOWN ON YOUR DRINKING: NO
SUBSTANCE_ABUSE: 0
HAVE PEOPLE ANNOYED YOU BY CRITICIZING YOUR DRINKING: NO
EVER FELT BAD OR GUILTY ABOUT YOUR DRINKING: NO

## 2023-11-09 ASSESSMENT — COGNITIVE AND FUNCTIONAL STATUS - GENERAL
MOBILITY SCORE: 24
SUGGESTED CMS G CODE MODIFIER DAILY ACTIVITY: CH
SUGGESTED CMS G CODE MODIFIER MOBILITY: CH
DAILY ACTIVITIY SCORE: 24

## 2023-11-09 ASSESSMENT — PAIN DESCRIPTION - PAIN TYPE: TYPE: ACUTE PAIN

## 2023-11-09 NOTE — ASSESSMENT & PLAN NOTE
Uncontrolled with TSH 57.2, likely secondary to noncompliance.  As per patient, he missed a few days of levothyroxine.  Will increase dose of levothyroxine from 125 to 150 mcg

## 2023-11-09 NOTE — CARE PLAN
Problem: Knowledge Deficit - Standard  Goal: Patient and family/care givers will demonstrate understanding of plan of care, disease process/condition, diagnostic tests and medications  Outcome: Progressing     Problem: Pain - Standard  Goal: Alleviation of pain or a reduction in pain to the patient’s comfort goal  Outcome: Progressing   The patient is Stable - Low risk of patient condition declining or worsening         Progress made toward(s) clinical / shift goals:  pt progressing toward goals    Patient is not progressing towards the following goals:

## 2023-11-09 NOTE — ASSESSMENT & PLAN NOTE
11/9/2023-pancytopenia.  Being managed at the [facility] with weekly infusions.  Etiology uncertain.  Also noted to have pulmonary nodules so is being referred for further work-up.  High sedimentation rate and CRP.  Thus also in differential diagnosis of the acute proptosis would be orbital myositis or orbital lymphoma.  Therefore recommend working up for systemic lymphoma.  According to some records either bone marrow biopsy was performed or was being considered through the [facility].  [facility].

## 2023-11-09 NOTE — ASSESSMENT & PLAN NOTE
Patient admitted for treatment for orbital cellulitis and small abscess anterolateral to the left globe.  Otherwise no intracranial abscess noted.  IV vancomycin and ceftriaxone chosen  Ophthalmology consult expected tomorrow, possibly with small abscess drain.  We will keep n.p.o.  Pain control as needed with IV and p.o. opiates  Monitor for improvement, consider reimaging in the next 1 to 2 days

## 2023-11-09 NOTE — ED NOTES
Med rec complete per pt and wife  Permission given by pt to contact wife    Aspirin ws last taken 2 days ago   1 person assist

## 2023-11-09 NOTE — ED NOTES
Bedside report given to the next shift RN Nemesio for continuity of care and management.  Provided opportunity to asks questions.  Pt connected to monitor  All pt belongings at bedside

## 2023-11-09 NOTE — ED PROVIDER NOTES
ED Provider Note    CHIEF COMPLAINT  Chief Complaint   Patient presents with    Eye Pain     Pain, redness, and swelling to both eyes x 2 days L >R. Patient was seen by an optometrist today who told him to come to the ER to be treated for orbital cellulitis.        EXTERNAL RECORDS REVIEWED  Outpatient Notes patient has a history of COPD with chronic hypoxic respiratory failure on 2 L as well as rheumatoid arthritis.    HPI/ROS  LIMITATION TO HISTORY   Select: : None  OUTSIDE HISTORIAN(S):  EMS patient was seen by an optometrist today who told to come the ER be treated for orbital cellulitis and EMS brought him in.    King Alegria is a 78 y.o. male who presents to the emerge department chief complaint of pain and swelling to his eyes over the last 2 to 3 days.  Mostly started in the left eye but over the last few hours it started kind and going to the right eye as well.  He states he had a fever last night.  Denies any trauma.  He states his vision is pretty diminished.  He states in the left eye he can really only see shapes but he can count fingers.  In the right eye slowly starting get fuzzy as well.  He is got a little bit headache.  He endorses nausea but no vomiting.  He denies any trauma to the eyes. PAtient does have a hx of hypothyroidism    PAST MEDICAL HISTORY   has a past medical history of Arthritis, Chronic respiratory failure with hypoxia ([facility]) (09/15/2023), COPD (chronic obstructive pulmonary disease) ([facility]), Former smoker (09/15/2023), Heart attack ([facility]) (1998), Heart attack ([facility]) (1999), Hyperlipidemia, Hypertension, Lung nodule (09/15/2023), Pancytopenia ([facility]) (09/15/2023), Prostate cancer ([facility]) (2 years ago), and Rheumatoid arthritis ([facility]) (12/15/2015).    SURGICAL HISTORY  patient denies any surgical history    FAMILY HISTORY  Family History   Problem Relation Age of Onset    Cancer Mother        SOCIAL HISTORY  Social History     Tobacco Use    Smoking status: Former     Current packs/day:  "0.00     Average packs/day: 2.0 packs/day for 50.0 years (100.0 ttl pk-yrs)     Types: Cigarettes     Start date: 1963     Quit date: 2013     Years since quittin.9     Passive exposure: Never    Smokeless tobacco: Never   Vaping Use    Vaping Use: Never used   Substance and Sexual Activity    Alcohol use: No     Comment: ETOH abue in the past    Drug use: No    Sexual activity: Not on file       CURRENT MEDICATIONS  Home Medications    **Home medications have not yet been reviewed for this encounter**         ALLERGIES  No Known Allergies    PHYSICAL EXAM  VITAL SIGNS: /62   Pulse (!) 56   Temp 36.1 °C (97 °F)   Resp 19   Ht 1.702 m (5' 7\")   Wt 57.2 kg (126 lb)   SpO2 100%   BMI 19.73 kg/m²    Pulse OX: Pulse Oxygen level is within normal limits on his baseline oxygen  Constitutional: Alert in no apparent distress.  HENT: Normocephalic, Atraumatic, MMM  Eyes: Bilateral injected conjunctive a right significantly more than left and is actually edematous, right has mild edema to the conjunctive a, left periorbital upper and lower lid are completely swollen and edematous erythematous and indurated.  No swelling but mild erythema to the right cheek area.  Pupils are 3 bilateral and responsive to light.  Patient acuity is 20/70 on the right 2070 total and unable to see any letters on the left  Heart: Regular rate and rhythm, intact distal pulses   Lungs: Symmetrical movement, no resp distress clear to auscultation bilaterally  Abdomen: Non-tender, non-distended, normal bowel sounds  Skin: Warm, Dry, No erythema, No rash.   Neurologic: Alert and oriented, Grossly non-focal.       DIAGNOSTIC STUDIES / PROCEDURES      LABS  Labs Reviewed   CBC WITH DIFFERENTIAL - Abnormal; Notable for the following components:       Result Value    RBC 2.25 (*)     Hemoglobin 8.1 (*)     Hematocrit 24.9 (*)     .7 (*)     MCH 36.0 (*)     RDW 77.9 (*)     Platelet Count 109 (*)     Neutrophils-Polys " "85.20 (*)     Lymphocytes 4.90 (*)     Immature Granulocytes 1.10 (*)     Lymphs (Absolute) 0.27 (*)     All other components within normal limits   COMP METABOLIC PANEL - Abnormal; Notable for the following components:    Sodium 133 (*)     Globulin 4.8 (*)     All other components within normal limits   CRP QUANTITIVE (NON-CARDIAC) - Abnormal; Notable for the following components:    Stat C-Reactive Protein 5.08 (*)     All other components within normal limits   SED RATE - Abnormal; Notable for the following components:    Sed Rate Westergren 91 (*)     All other components within normal limits   TSH - Abnormal; Notable for the following components:    TSH 57.200 (*)     All other components within normal limits   FREE THYROXINE - Abnormal; Notable for the following components:    Free T-4 0.63 (*)     All other components within normal limits   T3 FREE - Abnormal; Notable for the following components:    T3,Free 1.02 (*)     All other components within normal limits   LACTIC ACID   LACTIC ACID   MAGNESIUM   PHOSPHORUS   MRSA BY PCR (AMP)    Narrative:     Release to patient->Immediate   ESTIMATED GFR   PLATELET ESTIMATE   MORPHOLOGY   PERIPHERAL SMEAR REVIEW   DIFFERENTIAL COMMENT   CREATINE KINASE   BLOOD CULTURE    Narrative:     1 of 2 for Blood Culture x 2 sites order. Per Hospital  Policy: Only change Specimen Src: to \"Line\" if specified by  physician order.  Release to patient->Immediate   BLOOD CULTURE    Narrative:     2 of 2 blood culture x2  Sites order. Per Hospital Policy:  Only change Specimen Src: to \"Line\" if specified by physician  order.  Release to patient->Immediate   CBC WITH DIFFERENTIAL   COMP METABOLIC PANEL         RADIOLOGY  I have independently interpreted the diagnostic imaging associated with this visit and am waiting the final reading from the radiologist.   My preliminary interpretation is as follows:     CT head without mass bleed or signs of inflammation or infection  CT orbits " concerning for inflammation infection of the left eye compared to right there is some proptosis    Radiologist interpretation:     [id number] WITH   Final Result         1.  Marked enlargement of the bilateral extraocular muscles as described with adjacent hazy fat stranding. Consider changes related to underlying thyroid pathology or orbital myositis. Consider other causes of extraocular muscle enlargement as clinically    appropriate.   2.  Small enhancing fluid collection anterior lateral to the left globe, appearance most typical of small abscess.   3.  Slight hazy bilateral intraorbital fat stranding, greater on the left, could be secondary to underlying extraocular muscle pathology or changes of orbital cellulitis.   4.  Bilateral proptosis, left slightly greater than right.      CT-HEAD WITH & W/O   Final Result         1.  No acute intracranial abnormality.   2.  Mild atrophy and nonspecific white matter changes most commonly associated with small vessel ischemia.   3.  Atherosclerosis            COURSE & MEDICAL DECISION MAKING    ED Observation Status? No; Patient does not meet criteria for ED Observation.     INITIAL ASSESSMENT, COURSE AND PLAN  Care Narrative: Patient presents emerged department with physical examination consistent with orbital cellulitis on the left as well as diminished vision.  He was treated with broad-spectrum antibiotic vancomycin and cefepime after discussion with pharmacy.  To be treated with some morphine as well for the pain.  CT scans of the orbits were ordered.      DISPOSITION AND DISCUSSIONS  10:55 PM  Spoke a Dr BUENO with optho who agrees with antibiotic choice at this time based on his imaging she will see him in the morning recommends we get evaluated pressure at the bedside.  And that if the abscess needs to be drained to consult ENT.    11:09 PM  Spoke w dr ann ENT and based on the size of the abscess was less than a centimeter just IV antibiotics at this time  and no intervention.    IOP PERFORMED AT THIS TIME R OD 20, IOP OS 28     Patient is feeling improved after pain management.  He is got exceedingly elevated sed rate and CRP but his white blood cell count is normal he does have a little of the left shift.  He was treated broadly for orbital cellulitis is also got myositis of the optic muscles.  He is feeling better after the morphine he understands the plan for hospitalization.    HYDRATION: Based on the patient's presentation of Sepsis the patient was given IV fluids. IV Hydration was used because oral hydration was not adequate alone. Upon recheck following hydration, the patient was improved.        I have discussed management of the patient with the following physicians and COLLIN's:  dr BUENO, DR Mendoza, Dr Tomlinson     Discussion of management with other QHP or appropriate source(s): Pharmacy for antibx choice        FINAL DIAGNOSIS  1. Orbital cellulitis on left    2. Orbital myositis of both sides           Electronically signed by: Keren Reyes M.D., 11/8/2023 7:04 PM

## 2023-11-09 NOTE — CONSULTS
Peds/Neuro Ophthalmology:   Diallo Ariza M.D.    Date & Time note created:    11/9/2023   11:59 AM     Referring MD / APRN:  Pcp Pt States None, Gurvinder Larsen*    Patient ID:  Name:             King Alegria   YOB: 1945  Age:                 78 y.o.  male   MRN:               [id number]    Chief Complaint/Reason for Visit:     Eye Pain (Pain, redness, and swelling to both eyes x 2 days L >R. Patient was seen by an optometrist today who told him to come to the ER to be treated for orbital cellulitis. )      History of Present Illness:    King Alegria is a 78 y.o. male   Acute onset of bilateral proptosis Os worse then OD. Admitted to [facility] for work up and treatment        Review of Systems:  Review of Systems   Eyes:  Positive for blurred vision, double vision, pain, discharge and redness.   Respiratory:  Positive for cough.        Past Medical History:   Past Medical History:   Diagnosis Date    Arthritis     Chronic respiratory failure with hypoxia (HCC) 09/15/2023    Uses 2lpm at rest, 3lpm with ambulation and at night    COPD (chronic obstructive pulmonary disease) (Formerly [facility])     Former smoker 09/15/2023    Heart attack (Formerly [facility]) 1998    Heart attack (Formerly [facility]) 1999    Had stents placed last month    Hyperlipidemia     Hypertension     Lung nodule 09/15/2023    Pancytopenia ([facility]ly [facility]) 09/15/2023    Prostate cancer (Formerly [facility]) 2 years ago    Had seeds    Rheumatoid arthritis (Formerly [facility]) 12/15/2015       Past Surgical History:  History reviewed. No pertinent surgical history.    Current Outpatient Medications:  Current Facility-Administered Medications   Medication Dose Route Frequency Provider Last Rate Last Admin    methylPREDNISolone sod succ (Solu-Medrol) 1,000 mg in  mL IVPB  1 g Intravenous DAILY Gurvinder Larsen M.D.        HYDROmorphone (Dilaudid) injection 1 mg  1 mg Intravenous Q2HRS PRN Keren Reyes M.D.        senna-docusate (Pericolace Or Senokot S) 8.6-50 MG per tablet  2 Tablet  2 Tablet Oral BID Alfredo Mane M.D.        And    polyethylene glycol/lytes (Miralax) PACKET 1 Packet  1 Packet Oral QDAY PRN Alfredo Mane M.D.        And    magnesium hydroxide (Milk Of Magnesia) suspension 30 mL  30 mL Oral QDAY PRN Alfredo Mane M.D.        And    bisacodyl (Dulcolax) suppository 10 mg  10 mg Rectal QDAY PRN Alfredo Mane M.D.        enoxaparin (Lovenox) inj 40 mg  40 mg Subcutaneous DAILY AT 1800 Alfredo Mane M.D.   40 mg at 11/08/23 2349    acetaminophen (Tylenol) tablet 650 mg  650 mg Oral Q6HRS PRN Alfredo Mane M.D.        oxyCODONE immediate-release (Roxicodone) tablet 5 mg  5 mg Oral Q3HRS PRN Alfredo Mane M.D.        Or    oxyCODONE immediate release (Roxicodone) tablet 10 mg  10 mg Oral Q3HRS PRN Alfredo Mane M.D.   10 mg at 11/09/23 0248    Or    morphine 4 MG/ML injection 4 mg  4 mg Intravenous Q3HRS PRN Alfredo Mane M.D.        ondansetron (Zofran) syringe/vial injection 4 mg  4 mg Intravenous Q4HRS PRN Alfredo Mane M.D.        ondansetron (Zofran ODT) dispertab 4 mg  4 mg Oral Q4HRS PRN Alfredo Mane M.D.        labetalol (Normodyne/Trandate) injection 10 mg  10 mg Intravenous Q4HRS PRN Alfredo Mane M.D. MD Alert...Vancomycin per Pharmacy   Other PHARMACY TO DOSE Alfredo Mane M.D.        cefTRIAXone (Rocephin) syringe 2,000 mg  2,000 mg Intravenous Q24HRS Alfredo Mane M.D.   2,000 mg at 11/09/23 0636    ascorbic acid (Vitamin C) tablet 500 mg  500 mg Oral QAM Alfredo Mane M.D.   500 mg at 11/09/23 0608    aspirin EC tablet 81 mg  81 mg Oral QHS Alfredo Mane M.D.   81 mg at 11/08/23 2349    atorvastatin (Lipitor) tablet 80 mg  80 mg Oral Nightly Alfredo Mane M.D.   80 mg at 11/09/23 0036    cetirizine (ZyrTEC) tablet 10 mg  10 mg Oral XAVIER Mane M.D.   10 mg at 11/09/23 0608    cyanocobalamin (Vitamin B-12) tablet 1,000 mcg  1,000 mcg Oral XAVIER Mane M.D.   1,000 mcg  at 23 0608    ferrous sulfate tablet 325 mg  325 mg Oral QHS Alfredo Mane M.D.   325 mg at 23 0037    isosorbide mononitrate SR (Imdur) tablet 60 mg  60 mg Oral QATERESA Mane M.D.   60 mg at 23 0607    levothyroxine (Synthroid) tablet 150 mcg  150 mcg Oral AM ES Alfredo Mane M.D.   150 mcg at 23 0608    oxybutynin (Ditropan) tablet 5 mg  5 mg Oral QHS Alfredo Mane M.D.   5 mg at 23 0037    potassium chloride SA (Kdur) tablet 20 mEq  20 mEq Oral DAILY Alfredo Mane M.D.   20 mEq at 23 0608    pyridoxine (Vitamin B-6) tablet 50 mg  50 mg Oral XAVIER Mane M.D.   50 mg at 23 0608    tamsulosin (Flomax) capsule 0.4 mg  0.4 mg Oral QA Alfredo Mane M.D.   0.4 mg at 23 0608    vitamin D3 (Cholecalciferol) tablet 1,000 Units  1,000 Units Oral DAILY Alfredo Mane M.D.   1,000 Units at 23 0607    atenolol (Tenormin) tablet 12.5 mg  12.5 mg Oral Q DAY Alfredo Mane M.D.   12.5 mg at 23 0607    vancomycin (Vancocin) 1,000 mg in  mL IVPB  1,000 mg Intravenous Q24HR Alfredo Mane M.D.           Allergies:  No Known Allergies    Family History:  Family History   Problem Relation Age of Onset    Cancer Mother        Social History:  Social History     Socioeconomic History    Marital status:      Spouse name: Not on file    Number of children: Not on file    Years of education: Not on file    Highest education level: Not on file   Occupational History    Not on file   Tobacco Use    Smoking status: Former     Current packs/day: 0.00     Average packs/day: 2.0 packs/day for 50.0 years (100.0 ttl pk-yrs)     Types: Cigarettes     Start date: 1963     Quit date: 2013     Years since quittin.9     Passive exposure: Never    Smokeless tobacco: Never   Vaping Use    Vaping Use: Never used   Substance and Sexual Activity    Alcohol use: No     Comment: ETOH abue in the past    Drug use: No     "Sexual activity: Not on file   Other Topics Concern    Not on file   Social History Narrative    Not on file     Social Determinants of Health     Financial Resource Strain: Not on file   Food Insecurity: Not on file   Transportation Needs: Not on file   Physical Activity: Not on file   Stress: Not on file   Social Connections: Not on file   Intimate Partner Violence: Not on file   Housing Stability: Not on file          Physical Exam:  Physical Exam    Oriented x 3  Weight/BMI: Body mass index is 19.73 kg/m².  /58   Pulse 61   Temp 36.4 °C (97.6 °F) (Temporal)   Resp 16   Ht 1.702 m (5' 7\")   Wt 57.2 kg (126 lb)   SpO2 94%     Base Eye Exam       Visual Acuity (Snellen - Linear)         Right Left    Near sc J16 J16              Pupils         Shape APD    Right Irregular None    Left Irregular None              Neuro/Psych       Oriented x3: Yes    Mood/Affect: Normal                  Strabismus Exam         -2 -3 -2   -4 -4 -4                       -2  -3   -4  -4                       -3 -3 -3   -4 -4 -4                Significant proptosis, OS greater than OD       Slit Lamp and Fundus Exam       External Exam         Right Left    External Ecchymosis, Edema, 1+ Proptosis Ecchymosis, Edema, 4+ Proptosis              Slit Lamp Exam         Right Left    Lids/Lashes edema edema    Conjunctiva/Sclera 2+ Chemosis 4+ Chemosis    Cornea Clear Clear    Anterior Chamber Deep and quiet Deep and quiet    Iris Round and reactive Round and reactive    Lens Posterior chamber intraocular lens Posterior chamber intraocular lens    Vitreous Normal Normal              Fundus Exam         Right Left    Disc Normal Normal    Macula Normal Normal    Vessels Normal Normal    Periphery Normal Normal                    Pertinent Lab/Test/Imaging Review:      Assessment and Plan:     * Orbital cellulitis- (present on admission)  Assessment & Plan  11/9/2023-overall the admission diagnosis is orbital cellulitis possibly " infectious in nature, and reviewing the MRI scan there was significant enlargement of the extraocular muscles including some fat stranding.  There was no apparent ethmoid sphenoid or maxillary sinusitis.  Therefore high in the differential diagnosis would be thyroid orbitopathy, also would be some sort of an orbital inflammatory syndrome or orbital lymphoma.    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  Francois score of 7.0 plus 2.  This includes spontaneous orbital pain, gaze evoked orbital pain, eyelid swelling, eyelid erythema, conjunctival redness, chemosis, inflammation of caruncle or plica.  As well as significant increased proptosis and decrease in immune movements.  Given the level of proptosis would recommend starting 1 g Solu-Medrol intravenous daily x3 then oral steroid taper.  Recommend obtaining thyroid antibodies as well as TSH receptor antibody.  May need to get oculoplastic surgery involved.  Current emergent standard treatment now includes teprotumumab.  This is an intravenous infusion that would need to be approved and given through a infusion center.  We will contact the patient access liaison at [facility]    Pancytopenia ([facility])- (present on admission)  Assessment & Plan  11/9/2023-pancytopenia.  Being managed at the [facility] with weekly infusions.  Etiology uncertain.  Also noted to have pulmonary nodules so is being referred for further work-up.  High sedimentation rate and CRP.  Thus also in differential diagnosis of the acute proptosis would be orbital myositis or orbital lymphoma.  Therefore recommend working up for systemic lymphoma.  According to some records either bone marrow biopsy was performed or was being considered through the [facility].  [facility].          Diallo Ariza M.D.

## 2023-11-09 NOTE — ED TRIAGE NOTES
Chief Complaint   Patient presents with    Eye Pain     Pain, redness, and swelling to both eyes x 2 days L >R. Patient was seen by an optometrist today who told him to come to the ER to be treated for orbital cellulitis.      Patient brought in by EMS from home.

## 2023-11-09 NOTE — ED NOTES
Bedside report received from off going RN/tech: JUSTIN Rodriguez, assumed care of patient.  POC discussed with patient. Call light within reach, all needs addressed at this time.       Fall risk interventions in place: Move the patient closer to the nurse's station, Patient's personal possessions are with in their safe reach, Place fall risk sign on patient's door, and Give patient urinal if applicable (all applicable per [place] Fall risk assessment)   Continuous monitoring: Pulse Ox or Blood Pressure  IVF/IV medications: Not Applicable   Oxygen: 2 LNC at baseline  Bedside sitter: Not Applicable   Isolation: Not Applicable

## 2023-11-09 NOTE — ED NOTES
Bedside report received from JUSTIN Moy, assumed care of patient.  POC discussed with patient. Call light within reach, all needs addressed at this time. Phlebotomist texted for second set of blood cultures prior to abx administration.      Fall risk interventions in place: Move the patient closer to the nurse's station, Patient's personal possessions are with in their safe reach, Place socks on patient, Keep floor surfaces clean and dry, and Accompanied to restroom (all applicable per [place] Fall risk assessment)   Continuous monitoring: Pulse Ox or Blood Pressure  IVF/IV medications: Infusion per MAR (List Med(s)) IVF  Oxygen: How many liters 2L  Bedside sitter: Not Applicable   Isolation: Not Applicable

## 2023-11-09 NOTE — ASSESSMENT & PLAN NOTE
11/9/2023-overall the admission diagnosis is orbital cellulitis possibly infectious in nature, and reviewing the MRI scan there was significant enlargement of the extraocular muscles including some fat stranding.  There was no apparent ethmoid sphenoid or maxillary sinusitis.  Therefore high in the differential diagnosis would be thyroid orbitopathy, also would be some sort of an orbital inflammatory syndrome or orbital lymphoma.

## 2023-11-09 NOTE — ED NOTES
Pt medicated per MAR. MRSA swab collected and sent to lab. IVF paused for CT. Pt taken to CT by tech on O2 in [facility].

## 2023-11-09 NOTE — ED NOTES
Notified to [facility] regarding the result of Visual Acuity:    Right Eye: 20/70  Left Eye: Unable to read  Both Eyes: 20/70

## 2023-11-09 NOTE — PROGRESS NOTES
Pharmacy Vancomycin Kinetics Note for 11/8/2023     78 y.o. male on Vancomycin day # 1     Vancomycin Indication (AUC Dosing): Skin/skin structure infection (Orbital cellulitis with small abscess)    Provider specified end date: 11/15/23    Active Antibiotics (From admission, onward)      Ordered     Ordering Provider       Wed Nov 8, 2023 11:54 PM    11/08/23 2354  vancomycin (Vancocin) 1,000 mg in  mL IVPB  (vancomycin (VANCOCIN) IV (LD + Maintenance))  EVERY 24 HOURS         Alfredo Mane M.D.       Wed Nov 8, 2023 11:25 PM    11/08/23 2325  cefTRIAXone (Rocephin) syringe 2,000 mg  EVERY 24 HOURS         Alfredo Mane M.D.    11/08/23 2325  MD Alert...Vancomycin per Pharmacy  PHARMACY TO DOSE        Question:  Indication(s) for vancomycin?  Answer:  Skin and soft tissue infection    Alfredo Mane M.D.       Wed Nov 8, 2023  7:11 PM    11/08/23 1911  vancomycin (Vancocin) 1,500 mg in  mL IVPB  (vancomycin (VANCOCIN) IV (LD + Maintenance))  ONCE         Keren Reyes M.D.          Dosing Weight: 57 kg (125 lb 10.6 oz)    Admission History: Admitted on 11/8/2023 for Orbital cellulitis [H05.019]  Pertinent history: 77yo man presents with pain, redness, and swelling to both eyes x 2 days L >R. Patient was seen by an optometrist today who told him to come to the ER to be treated for orbital cellulitis. CT shows bilatera cellulits and small abscess lateral to the L globe.    Allergies: Patient has no known allergies.     Pertinent cultures to date:   Results       Procedure Component Value Units Date/Time    MRSA By PCR (Amp) [[id number]] Collected: 11/08/23 2031    Order Status: Completed Specimen: Eye from Nares Updated: 11/08/23 2238     MRSA by PCR Negative    Narrative:      Release to patient->Immediate    Blood Culture [[id number]] Collected: 11/08/23 1954    Order Status: Sent Specimen: Blood from Peripheral Updated: 11/08/23 2118    Narrative:      2 of 2 blood culture x2  Sites order.  "Per Hospital Policy:  Only change Specimen Src: to \"Line\" if specified by physician  order.  Release to patient->Immediate    Blood Culture [[id number]] Collected: 23    Order Status: Sent Specimen: Blood from Peripheral Updated: 23    Narrative:      1 of 2 for Blood Culture x 2 sites order. Per Hospital  Policy: Only change Specimen Src: to \"Line\" if specified by  physician order.  Release to patient->Immediate          Labs: Estimated Creatinine Clearance: 80.7 mL/min (by C-G formula based on SCr of 0.63 mg/dL).  Recent Labs     23   WBC 5.5   NEUTSPOLYS 85.20*     Recent Labs     23   BUN 13   CREATININE 0.63   ALBUMIN 3.2     No intake or output data in the 24 hours ending 23 2355   /64   Pulse (!) 55   Temp 36.7 °C (98.1 °F) (Temporal)   Resp 17   Ht 1.702 m (5' 7\")   Wt 57.2 kg (126 lb)   SpO2 100%  Temp (24hrs), Av.6 °C (97.8 °F), Min:36.1 °C (97 °F), Max:36.8 °C (98.3 °F)    List concerns for Vancomycin clearance: Age;Receipt of contrast dye    Pharmacokinetics:   AUC kinetics:   Ke (hr ^-1): 0.0633 hr^-1  Half life: 10.95 hr  Clearance: 2.345  Estimated TDD: 1172.5  Estimated Dose: 635  Estimated interval: 13    A/P:   -  Vancomycin dose: 1500mg loading dose followed by 1000mg q24h    -  Next vancomycin level(s): not ordered    -  Predicted vancomycin AUC from initial AUC test calculator: 426 mg·hr/L    -  Comments: MRSA PCR negative. Discussed with MD and vanc continued for now due to finding of abscess. Access need for continuation and order monitoring if vanc is continued.    Lincoln Morrison, PharmD, BCPS    "

## 2023-11-09 NOTE — ASSESSMENT & PLAN NOTE
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  Francois score of 7.0 plus 2.  This includes spontaneous orbital pain, gaze evoked orbital pain, eyelid swelling, eyelid erythema, conjunctival redness, chemosis, inflammation of caruncle or plica.  As well as significant increased proptosis and decrease in immune movements.  Given the level of proptosis would recommend starting 1 g Solu-Medrol intravenous daily x3 then oral steroid taper.  Recommend obtaining thyroid antibodies as well as TSH receptor antibody.  May need to get oculoplastic surgery involved.  Current emergent standard treatment now includes teprotumumab.  This is an intravenous infusion that would need to be approved and given through a infusion center.  We will contact the patient access liaison at [facility]

## 2023-11-09 NOTE — ED NOTES
Bedside report received from previous shift.   Assumed patient care. Verified patient identification.  Checked on bed, connected to monitor,  with unlabored respirations. Discussed plan of care.   Vital signs is stable. Denied any new complaints.   Gurney in low position, side rail up for pt safety. Call light within reach.   No needs identified at the moment. Instructed to use call light when needed.    Contraptions: IV gauge 20 Lt FA  Alert and Oriented: x 3  Ambulatory: Not at the moment  Oxygen: 2 LPM via nasal cannula (Baseline)  Pending: Admission

## 2023-11-09 NOTE — PROGRESS NOTES
4 Eyes Skin Assessment Completed by JUSTIN espinosa and JUSTIN amin.    Head Swelling to L eye  Ears WDL  Nose WDL  Mouth WDL  Neck WDL  Breast/Chest WDL  Shoulder Blades WDL  Spine WDL  (R) Arm/Elbow/Hand WDL  (L) Arm/Elbow/Hand WDL  Abdomen WDL  Groin WDL  Scrotum/Coccyx/Buttocks WDL  (R) Leg WDL  (L) Leg WDL  (R) Heel/Foot/Toe WDL  (L) Heel/Foot/Toe WDL          Devices In Places       Interventions In Place     Possible Skin Injury No    Pictures Uploaded Into Epic N/A  Wound Consult Placed N/A  RN Wound Prevention Protocol Ordered No

## 2023-11-09 NOTE — H&P
[facility] Medicine History & Physical Note    Date of Service  11/8/2023    Primary Care Physician  Pcp Pt States None    Consultants  Ophthalmology    Code Status  Full Code    Chief Complaint  Chief Complaint   Patient presents with    Eye Pain     Pain, redness, and swelling to both eyes x 2 days L >R. Patient was seen by an optometrist today who told him to come to the ER to be treated for orbital cellulitis.        History of Presenting Illness  King Alegria is a 78 y.o. male with past medical history of COPD, chronic hypoxia on 2 L, CAD, MI, hypertension, dyslipidemia, rheumatoid arthritis, hypothyroidism, who presented 11/8/2023 with pain redness and swelling to both eyes for 2 days, greater in the left eye.  Patient was seen by optometrist and was told to come to ED for orbital cellulitis.   CTA head and orbit: Bilateral extraocular muscles stranding and enlargement.  Small fluid collection anterior and lateral to the left globe, likely a small abscess.  Hazy bilateral intraorbital fat stranding, greater on the left.  Bilateral proptosis, worse on the left side.   ERP Dr Reyes spoke to Dr. Mendoza.  Recommended empiric antibiotics for orbital cellulitis.  Formal ophthalmology consult expected tomorrow    I discussed the plan of care with patient and ERP .    Review of Systems  Review of Systems   Constitutional:  Negative for chills, fever and weight loss.   HENT:  Negative for ear pain, hearing loss and tinnitus.    Eyes:  Positive for pain, discharge and redness. Negative for blurred vision, double vision and photophobia.   Respiratory:  Negative for cough, hemoptysis and sputum production.    Cardiovascular:  Negative for chest pain, palpitations and orthopnea.   Gastrointestinal:  Negative for heartburn, nausea and vomiting.   Genitourinary:  Negative for dysuria, flank pain, frequency and hematuria.   Musculoskeletal:  Negative for back pain, joint pain and neck pain.   Skin:  Negative for itching and  rash.   Neurological:  Negative for tremors, speech change, focal weakness and headaches.   Endo/Heme/Allergies:  Negative for environmental allergies and polydipsia. Does not bruise/bleed easily.   Psychiatric/Behavioral:  Negative for hallucinations and substance abuse. The patient is not nervous/anxious.        Past Medical History   has a past medical history of Arthritis, Chronic respiratory failure with hypoxia ([facility]) (09/15/2023), COPD (chronic obstructive pulmonary disease) ([facility]), Former smoker (09/15/2023), Heart attack ([facility]) (1998), Heart attack ([facility]) (1999), Hyperlipidemia, Hypertension, Lung nodule (09/15/2023), Pancytopenia ([facility]) (09/15/2023), Prostate cancer ([facility]) (2 years ago), and Rheumatoid arthritis ([facility]) (12/15/2015).    Surgical History   has no past surgical history on file.     Family History  family history includes Cancer in his mother.   Family history reviewed with patient. There is no family history that is pertinent to the chief complaint.     Social History   reports that he quit smoking about 9 years ago. His smoking use included cigarettes. He started smoking about 59 years ago. He has a 100.0 pack-year smoking history. He has never been exposed to tobacco smoke. He has never used smokeless tobacco. He reports that he does not drink alcohol and does not use drugs.    Allergies  No Known Allergies    Medications  Prior to Admission Medications   Prescriptions Last Dose Informant Patient Reported? Taking?   Cyanocobalamin (VITAMIN B-12) 1000 MCG Tab 11/6/2023  Yes Yes   Sig: Take 1,000 mcg by mouth every morning.   ascorbic acid (VITAMIN C) 500 MG tablet 11/6/2023  Yes Yes   Sig: Take 500 mg by mouth every morning.   aspirin EC (ECOTRIN) 81 MG Tablet Delayed Response 11/6/2023  Yes No   Sig: Take 81 mg by mouth at bedtime.   atenolol (TENORMIN) 25 MG Tab 11/6/2023  Yes No   Sig: Take 25 mg by mouth every morning.   atorvastatin (LIPITOR) 80 MG tablet 11/6/2023  Yes No   Sig: Take 80 mg by  mouth every evening.   cetirizine (ZYRTEC) 10 MG Tab 11/6/2023  Yes Yes   Sig: Take 10 mg by mouth every morning.   docusate calcium (SURFAK) 240 MG Cap 11/6/2023  Yes Yes   Sig: Take 240 mg by mouth at bedtime.   ferrous sulfate 325 (65 Fe) MG tablet 11/6/2023  Yes Yes   Sig: Take 325 mg by mouth at bedtime.   isosorbide mononitrate SR (IMDUR) 60 MG TABLET SR 24 HR 11/6/2023  Yes Yes   Sig: Take 60 mg by mouth every morning.   levothyroxine (SYNTHROID) 125 MCG Tab 11/6/2023  Yes Yes   Sig: Take 125 mcg by mouth every morning on an empty stomach.   oxybutynin (DITROPAN) 5 MG Tab 11/6/2023  Yes No   Sig: Take 5 mg by mouth at bedtime.   potassium chloride SA (KDUR) 20 MEQ Tab CR 11/6/2023  Yes Yes   Sig: Take 20 mEq by mouth 2 times a day.   pyridoxine (VITAMIN B-6) 50 MG Tab 11/6/2023  Yes Yes   Sig: Take 50 mg by mouth every morning.   tamsulosin (FLOMAX) 0.4 MG capsule 11/6/2023  Yes No   Sig: Take 0.4 mg by mouth every morning.   vitamin D (CHOLECALCIFEROL) 1000 UNIT Tab 11/6/2023  Yes No   Sig: Take 1,000 Units by mouth every day.      Facility-Administered Medications: None       Physical Exam  Temp:  [36.1 °C (97 °F)-36.8 °C (98.3 °F)] 36.7 °C (98.1 °F)  Pulse:  [55-59] 55  Resp:  [16-19] 17  BP: (124-133)/(59-64) 130/64  SpO2:  [98 %-100 %] 100 %  Blood Pressure : 130/64   Temperature: 36.7 °C (98.1 °F)   Pulse: (!) 55   Respiration: 17   Pulse Oximetry: 100 %       Physical Exam  Vitals and nursing note reviewed.   Constitutional:       General: He is not in acute distress.     Appearance: Normal appearance. He is ill-appearing.   HENT:      Head: Normocephalic and atraumatic.      Nose: Nose normal.      Mouth/Throat:      Mouth: Mucous membranes are moist.   Eyes:      Extraocular Movements: Extraocular movements intact.      Left eye: Abnormal extraocular motion present.      Conjunctiva/sclera:      Left eye: Left conjunctiva is injected. Chemosis and exudate present.      Pupils: Pupils are equal,  "round, and reactive to light.      Comments: Proptosis, worse on the left side.  Decreased ROM, worse on the left side.  Injection, chemosis and exudate, worse on the left side.   Cardiovascular:      Rate and Rhythm: Normal rate and regular rhythm.   Pulmonary:      Effort: Pulmonary effort is normal.      Breath sounds: Normal breath sounds.   Abdominal:      General: Abdomen is flat. There is no distension.      Tenderness: There is no abdominal tenderness.   Musculoskeletal:         General: No swelling or deformity. Normal range of motion.      Cervical back: Normal range of motion and neck supple.   Skin:     General: Skin is warm and dry.   Neurological:      General: No focal deficit present.      Mental Status: He is alert and oriented to person, place, and time.   Psychiatric:         Mood and Affect: Mood normal.         Behavior: Behavior normal.         Laboratory:  Recent Labs     11/08/23  1844   WBC 5.5   RBC 2.25*   HEMOGLOBIN 8.1*   HEMATOCRIT 24.9*   .7*   MCH 36.0*   MCHC 32.5   RDW 77.9*   PLATELETCT 109*   MPV 9.6     Recent Labs     11/08/23  1844   SODIUM 133*   POTASSIUM 3.7   CHLORIDE 98   CO2 25   GLUCOSE 98   BUN 13   CREATININE 0.63   CALCIUM 9.4     Recent Labs     11/08/23  1844   ALTSGPT 19   ASTSGOT 17   ALKPHOSPHAT 81   TBILIRUBIN 0.5   GLUCOSE 98         No results for input(s): \"NTPROBNP\" in the last 72 hours.      No results for input(s): \"TROPONINT\" in the last 72 hours.    Imaging:  [id number] WITH   Final Result         1.  Marked enlargement of the bilateral extraocular muscles as described with adjacent hazy fat stranding. Consider changes related to underlying thyroid pathology or orbital myositis. Consider other causes of extraocular muscle enlargement as clinically    appropriate.   2.  Small enhancing fluid collection anterior lateral to the left globe, appearance most typical of small abscess.   3.  Slight hazy bilateral intraorbital fat stranding, greater " on the left, could be secondary to underlying extraocular muscle pathology or changes of orbital cellulitis.   4.  Bilateral proptosis, left slightly greater than right.      CT-HEAD WITH & W/O   Final Result         1.  No acute intracranial abnormality.   2.  Mild atrophy and nonspecific white matter changes most commonly associated with small vessel ischemia.   3.  Atherosclerosis            Assessment/Plan:  Justification for Admission Status  I anticipate this patient will require at least two midnights for appropriate medical management, necessitating inpatient admission because orbital cellulitis    Patient will need a Med/Surg bed on MEDICAL service .  The need is secondary to orbital cellulitis.    * Orbital cellulitis- (present on admission)  Assessment & Plan  Patient admitted for treatment for orbital cellulitis and small abscess anterolateral to the left globe.  Otherwise no intracranial abscess noted.  IV vancomycin and ceftriaxone chosen  Ophthalmology consult expected tomorrow, possibly with small abscess drain.  We will keep n.p.o.  Pain control as needed with IV and p.o. opiates  Monitor for improvement, consider reimaging in the next 1 to 2 days    Hypothyroidism  Assessment & Plan  Uncontrolled with TSH 57.2, likely secondary to noncompliance.  As per patient, he missed a few days of levothyroxine.  Will increase dose of levothyroxine from 125 to 150 mcg    Thrombocytopenia (HCC)  Assessment & Plan  Platelets count 109.  Chronic at baseline    Chronic anemia  Assessment & Plan  Hemoglobin 8.1, .  At baseline.  No evidence of bleeding    Chronic respiratory failure with hypoxia (HCC)- (present on admission)  Assessment & Plan  Continue oxygen supplementation 2 L    Rheumatoid arthritis (HCC)- (present on admission)  Assessment & Plan  Currently not on disease modifying medications.  Tylenol, oxycodone as needed        VTE prophylaxis: enoxaparin ppx

## 2023-11-09 NOTE — PROGRESS NOTES
Hospital Medicine Daily Progress Note    Date of Service  11/9/2023    Chief Complaint  King Alegria is a 78 y.o. male admitted 11/8/2023 with left eye swelling    Hospital Course  78 y.o. male with past medical history of COPD, chronic hypoxia on 2 L, CAD, MI, hypertension, dyslipidemia, rheumatoid arthritis, hypothyroidism, and MDS/aplastic aplastic anemia who [facility] and requires weekly transfusions presented to emergency department on 11/8/2023 with a 2-day history of bilateral eye pain and left eye swelling and was subsequently admitted for bilateral orbital cellulitis.    CTA head and orbit: Bilateral extraocular muscles stranding and enlargement.  Small fluid collection anterior and lateral to the left globe, likely a small abscess.  Hazy bilateral intraorbital fat stranding, greater on the left.  Bilateral proptosis, worse on the left side.   ERP Dr Reyes spoke to Dr. Mendoza.  Recommended empiric antibiotics for orbital cellulitis.  Formal ophthalmology consult expected tomorrow    Interval Problem Update  Evaluated at bedside  Reporting left eye swelling and still having pain  Noted for severe bilateral proptosis  11/8 blood cultures with no growth today  Continue Rocephin/vancomycin antibiotic regimen  Ophthalmology following  Start IV steroid  Ophthalmology organizing to initiate Teprotumumab infusions  Pending  Pending thyroid labs  Orbital MRI  Daily labs, history of MDS/aplastic anemia for which he requires weekly outpatient PRBC transfusions  [facility]ed [facility] records    I have discussed this patient's plan of care and discharge plan at IDT rounds today with Case Management, Nursing, Nursing leadership, and other members of the IDT team.    Consultants/Specialty  Ophthalmology    Code Status  Full Code    Disposition  The patient is not medically cleared for discharge to home or a post-acute facility.  Anticipate discharge to: home with close outpatient follow-up    I have placed the  appropriate orders for post-discharge needs.    Review of Systems  Review of Systems   Constitutional: Negative.    HENT: Negative.     Eyes:  Positive for blurred vision, pain, discharge and redness.   Respiratory: Negative.     Cardiovascular: Negative.    Gastrointestinal: Negative.    Genitourinary: Negative.    Musculoskeletal: Negative.    Skin: Negative.    Neurological: Negative.    Endo/Heme/Allergies: Negative.    Psychiatric/Behavioral: Negative.          Physical Exam  Temp:  [36.4 °C (97.6 °F)-36.8 °C (98.3 °F)] 36.4 °C (97.6 °F)  Pulse:  [55-71] 60  Resp:  [16-17] 16  BP: (105-169)/(54-67) 105/54  SpO2:  [94 %-100 %] 94 %    Physical Exam  Constitutional:       General: He is not in acute distress.     Appearance: Normal appearance.   HENT:      Head: Normocephalic and atraumatic.      Nose: Nose normal. No congestion.      Mouth/Throat:      Mouth: Mucous membranes are moist.   Eyes:      General:         Right eye: Discharge present.         Left eye: Discharge present.     Conjunctiva/sclera:      Right eye: Right conjunctiva is injected. Chemosis present.      Left eye: Left conjunctiva is injected. Chemosis present.      Comments: Proptosis, worse on the left side.  Decreased ROM, worse on the left side.  Injection, chemosis and exudate, worse on the left side.    Cardiovascular:      Rate and Rhythm: Normal rate and regular rhythm.      Pulses: Normal pulses.      Heart sounds: Normal heart sounds.   Pulmonary:      Effort: Pulmonary effort is normal.      Breath sounds: Normal breath sounds.   Abdominal:      General: Bowel sounds are normal.      Palpations: Abdomen is soft.      Tenderness: There is no abdominal tenderness.   Musculoskeletal:         General: No swelling. Normal range of motion.      Cervical back: Normal range of motion and neck supple.   Skin:     General: Skin is warm.      Coloration: Skin is not jaundiced.   Neurological:      General: No focal deficit present.       Mental Status: He is alert and oriented to person, place, and time. Mental status is at baseline.      Cranial Nerves: No cranial nerve deficit.   Psychiatric:         Mood and Affect: Mood normal.         Behavior: Behavior normal.         Thought Content: Thought content normal.         Judgment: Judgment normal.           Fluids    Intake/Output Summary (Last 24 hours) at 11/9/2023 1848  Last data filed at 11/9/2023 1633  Gross per 24 hour   Intake 240 ml   Output 800 ml   Net -560 ml       Laboratory  Recent Labs     11/08/23 1844 11/09/23  0350   WBC 5.5 5.3   RBC 2.25* 2.25*   HEMOGLOBIN 8.1* 8.0*   HEMATOCRIT 24.9* 25.0*   .7* 111.1*   MCH 36.0* 35.6*   MCHC 32.5 32.0*   RDW 77.9* 77.1*   PLATELETCT 109* 118*   MPV 9.6 9.6     Recent Labs     11/08/23 1844 11/09/23  0350   SODIUM 133* 136   POTASSIUM 3.7 3.6   CHLORIDE 98 102   CO2 25 26   GLUCOSE 98 90   BUN 13 10   CREATININE 0.63 0.58   CALCIUM 9.4 8.7                   Imaging  [id number]   Final Result         1.  Marked enlargement of the bilateral extraocular muscles as described with adjacent hazy fat stranding. Consider changes related to underlying thyroid pathology or orbital myositis. Consider other causes of extraocular muscle enlargement as clinically    appropriate.   2.  Small enhancing fluid collection anterior lateral to the left globe, appearance most typical of small abscess.   3.  Slight hazy bilateral intraorbital fat stranding, greater on the left, could be secondary to underlying extraocular muscle pathology or changes of orbital cellulitis.   4.  Bilateral proptosis, left slightly greater than right.      CT-HEAD WITH & W/O   Final Result         1.  No acute intracranial abnormality.   2.  Mild atrophy and nonspecific white matter changes most commonly associated with small vessel ischemia.   3.  Atherosclerosis      MR-ORBITS,FACE,NECK-WITH&W/O & SEQUENCES    (Results Pending)        Assessment/Plan  * Orbital  "cellulitis- (present on admission)  Assessment & Plan  Patient admitted for treatment for orbital cellulitis and small abscess anterolateral to the left globe.  Otherwise no intracranial abscess noted.  IV vancomycin and ceftriaxone chosen  Ophthalmology consult expected tomorrow, possibly with small abscess drain.  We will keep n.p.o.  Pain control as needed with IV and p.o. 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  As per patient, he missed a few days of levothyroxine.  Will increase dose of levothyroxine from 125 to 150 mcg    Chronic respiratory failure with hypoxia (HCC)- (present on admission)  Assessment & Plan  Currently on room air  Resolved    Rheumatoid arthritis (HCC)- (present on admission)  Assessment & Plan  Currently not on disease modifying medications.  Tylenol, oxycodone as needed    Thrombocytopenia (HCC)- (present on admission)  Assessment & Plan  Secondary to aplastic anemia    Chronic anemia- (present on admission)  Assessment & Plan  Secondary to aplastic anemia    Pancytopenia (HCC)- (present on admission)  Assessment & Plan  Secondary to history of MDS/aplastic anemia diagnoses and oncology notes noted on care everywhere  [facility] and " "was going to establish with [facility]  Has outpatient appointment for [facility]  Receives weekly blood transfusions as well as \"shots\"  Continue monitoring daily CBCs and CMP  [facility]ending [facility] records         VTE prophylaxis: SCDs, possible OR on a.m.    I have performed a physical exam and reviewed and updated ROS and Plan today (11/9/2023). In review of yesterday's note (11/8/2023), there are no changes except as documented above.    Greater than 51 minutes spent prepping to see patient (e.g. review of tests) obtaining and/or reviewing separately obtained history. Performing a medically appropriate examination and/ evaluation.  Counseling and educating the patient/family/caregiver.  Ordering medications, tests, or procedures.  Referring and communicating with other health care professionals.  Documenting clinical information in EPIC.  Independently interpreting results and communicating results to patient/family/caregiver.  Care coordination      "

## 2023-11-10 ENCOUNTER — APPOINTMENT (OUTPATIENT)
Dept: RADIOLOGY | Facility: MEDICAL CENTER | Age: 78
DRG: 121 | End: 2023-11-10
Attending: STUDENT IN AN ORGANIZED HEALTH CARE EDUCATION/TRAINING PROGRAM
Payer: COMMERCIAL

## 2023-11-10 LAB
ALBUMIN SERPL BCP-MCNC: 3.5 G/DL (ref 3.2–4.9)
ALBUMIN/GLOB SERPL: 0.9 G/DL
ALP SERPL-CCNC: 81 U/L (ref 30–99)
ALT SERPL-CCNC: 20 U/L (ref 2–50)
ANION GAP SERPL CALC-SCNC: 10 MMOL/L (ref 7–16)
AST SERPL-CCNC: 15 U/L (ref 12–45)
BASOPHILS # BLD AUTO: 0.3 % (ref 0–1.8)
BASOPHILS # BLD: 0.01 K/UL (ref 0–0.12)
BILIRUB SERPL-MCNC: 0.5 MG/DL (ref 0.1–1.5)
BUN SERPL-MCNC: 14 MG/DL (ref 8–22)
CALCIUM ALBUM COR SERPL-MCNC: 9.6 MG/DL (ref 8.5–10.5)
CALCIUM SERPL-MCNC: 9.2 MG/DL (ref 8.5–10.5)
CHLORIDE SERPL-SCNC: 101 MMOL/L (ref 96–112)
CO2 SERPL-SCNC: 25 MMOL/L (ref 20–33)
CREAT SERPL-MCNC: 0.74 MG/DL (ref 0.5–1.4)
EOSINOPHIL # BLD AUTO: 0 K/UL (ref 0–0.51)
EOSINOPHIL NFR BLD: 0 % (ref 0–6.9)
ERYTHROCYTE [DISTWIDTH] IN BLOOD BY AUTOMATED COUNT: 76.5 FL (ref 35.9–50)
GFR SERPLBLD CREATININE-BSD FMLA CKD-EPI: 94 ML/MIN/1.73 M 2
GLOBULIN SER CALC-MCNC: 4.1 G/DL (ref 1.9–3.5)
GLUCOSE SERPL-MCNC: 120 MG/DL (ref 65–99)
HCT VFR BLD AUTO: 25.5 % (ref 42–52)
HGB BLD-MCNC: 8.2 G/DL (ref 14–18)
IMM GRANULOCYTES # BLD AUTO: 0.03 K/UL (ref 0–0.11)
IMM GRANULOCYTES NFR BLD AUTO: 0.9 % (ref 0–0.9)
LYMPHOCYTES # BLD AUTO: 0.25 K/UL (ref 1–4.8)
LYMPHOCYTES NFR BLD: 7.8 % (ref 22–41)
MCH RBC QN AUTO: 35.5 PG (ref 27–33)
MCHC RBC AUTO-ENTMCNC: 32.2 G/DL (ref 32.3–36.5)
MCV RBC AUTO: 110.4 FL (ref 81.4–97.8)
MONOCYTES # BLD AUTO: 0.28 K/UL (ref 0–0.85)
MONOCYTES NFR BLD AUTO: 8.7 % (ref 0–13.4)
NEUTROPHILS # BLD AUTO: 2.64 K/UL (ref 1.82–7.42)
NEUTROPHILS NFR BLD: 82.3 % (ref 44–72)
NRBC # BLD AUTO: 0 K/UL
NRBC BLD-RTO: 0 /100 WBC (ref 0–0.2)
PLATELET # BLD AUTO: 110 K/UL (ref 164–446)
PMV BLD AUTO: 9.9 FL (ref 9–12.9)
POTASSIUM SERPL-SCNC: 4 MMOL/L (ref 3.6–5.5)
PROT SERPL-MCNC: 7.6 G/DL (ref 6–8.2)
RBC # BLD AUTO: 2.31 M/UL (ref 4.7–6.1)
SODIUM SERPL-SCNC: 136 MMOL/L (ref 135–145)
WBC # BLD AUTO: 3.2 K/UL (ref 4.8–10.8)

## 2023-11-10 PROCEDURE — 700102 HCHG RX REV CODE 250 W/ 637 OVERRIDE(OP): Performed by: INTERNAL MEDICINE

## 2023-11-10 PROCEDURE — 99233 SBSQ HOSP IP/OBS HIGH 50: CPT | Performed by: STUDENT IN AN ORGANIZED HEALTH CARE EDUCATION/TRAINING PROGRAM

## 2023-11-10 PROCEDURE — A9270 NON-COVERED ITEM OR SERVICE: HCPCS | Performed by: INTERNAL MEDICINE

## 2023-11-10 PROCEDURE — 36415 COLL VENOUS BLD VENIPUNCTURE: CPT

## 2023-11-10 PROCEDURE — 770006 HCHG ROOM/CARE - MED/SURG/GYN SEMI*

## 2023-11-10 PROCEDURE — 76536 US EXAM OF HEAD AND NECK: CPT

## 2023-11-10 PROCEDURE — 80053 COMPREHEN METABOLIC PANEL: CPT

## 2023-11-10 PROCEDURE — 700105 HCHG RX REV CODE 258: Performed by: STUDENT IN AN ORGANIZED HEALTH CARE EDUCATION/TRAINING PROGRAM

## 2023-11-10 PROCEDURE — 85025 COMPLETE CBC W/AUTO DIFF WBC: CPT

## 2023-11-10 PROCEDURE — 700111 HCHG RX REV CODE 636 W/ 250 OVERRIDE (IP): Performed by: INTERNAL MEDICINE

## 2023-11-10 PROCEDURE — 700105 HCHG RX REV CODE 258: Performed by: INTERNAL MEDICINE

## 2023-11-10 PROCEDURE — 700111 HCHG RX REV CODE 636 W/ 250 OVERRIDE (IP): Performed by: STUDENT IN AN ORGANIZED HEALTH CARE EDUCATION/TRAINING PROGRAM

## 2023-11-10 RX ADMIN — CEFTRIAXONE SODIUM 2000 MG: 10 INJECTION, POWDER, FOR SOLUTION INTRAVENOUS at 09:43

## 2023-11-10 RX ADMIN — CETIRIZINE HYDROCHLORIDE 10 MG: 10 TABLET, FILM COATED ORAL at 05:48

## 2023-11-10 RX ADMIN — ATENOLOL 12.5 MG: 25 TABLET ORAL at 05:47

## 2023-11-10 RX ADMIN — CYANOCOBALAMIN TAB 500 MCG 1000 MCG: 500 TAB at 05:47

## 2023-11-10 RX ADMIN — POTASSIUM CHLORIDE 20 MEQ: 1500 TABLET, EXTENDED RELEASE ORAL at 05:48

## 2023-11-10 RX ADMIN — METHYLPREDNISOLONE SODIUM SUCCINATE 1000 MG: 1 INJECTION, POWDER, LYOPHILIZED, FOR SOLUTION INTRAMUSCULAR; INTRAVENOUS at 13:28

## 2023-11-10 RX ADMIN — ENOXAPARIN SODIUM 40 MG: 100 INJECTION SUBCUTANEOUS at 17:53

## 2023-11-10 RX ADMIN — OXYBUTYNIN CHLORIDE 5 MG: 5 TABLET ORAL at 20:39

## 2023-11-10 RX ADMIN — ASPIRIN 81 MG: 81 TABLET, COATED ORAL at 20:39

## 2023-11-10 RX ADMIN — VANCOMYCIN HYDROCHLORIDE 1000 MG: 5 INJECTION, POWDER, LYOPHILIZED, FOR SOLUTION INTRAVENOUS at 20:41

## 2023-11-10 RX ADMIN — TAMSULOSIN HYDROCHLORIDE 0.4 MG: 0.4 CAPSULE ORAL at 05:48

## 2023-11-10 RX ADMIN — PYRIDOXINE HCL TAB 50 MG 50 MG: 50 TAB at 05:47

## 2023-11-10 RX ADMIN — Medication 1000 UNITS: at 05:47

## 2023-11-10 RX ADMIN — DOCUSATE SODIUM 50 MG AND SENNOSIDES 8.6 MG 2 TABLET: 8.6; 5 TABLET, FILM COATED ORAL at 17:50

## 2023-11-10 RX ADMIN — FERROUS SULFATE TAB 325 MG (65 MG ELEMENTAL FE) 325 MG: 325 (65 FE) TAB at 20:39

## 2023-11-10 RX ADMIN — LEVOTHYROXINE SODIUM 150 MCG: 0.15 TABLET ORAL at 05:48

## 2023-11-10 RX ADMIN — Medication 500 MG: at 05:48

## 2023-11-10 RX ADMIN — ATORVASTATIN CALCIUM 80 MG: 80 TABLET, FILM COATED ORAL at 20:39

## 2023-11-10 ASSESSMENT — ENCOUNTER SYMPTOMS
MUSCULOSKELETAL NEGATIVE: 1
BLURRED VISION: 1
RESPIRATORY NEGATIVE: 1
EYE REDNESS: 1
PSYCHIATRIC NEGATIVE: 1
GASTROINTESTINAL NEGATIVE: 1
NEUROLOGICAL NEGATIVE: 1
EYE DISCHARGE: 1
CARDIOVASCULAR NEGATIVE: 1
EYE PAIN: 1
CONSTITUTIONAL NEGATIVE: 1

## 2023-11-10 ASSESSMENT — PAIN DESCRIPTION - PAIN TYPE: TYPE: ACUTE PAIN

## 2023-11-10 NOTE — PROGRESS NOTES
MD inquired about stat MRI and I informed it has not been completed as of yet but they did reach out and form is completed. MD asked me to reach out to MRI and I spoke with [facility]. He advised they are backlogged with stat MRI from Monday and Tuesday when the MRI was down. I informed this is truly urgent. I notified MD.

## 2023-11-10 NOTE — CARE PLAN
The patient is Watcher - Medium risk of patient condition declining or worsening    Shift Goals  Clinical Goals: MRI  Patient Goals: comfort, resolution    Progress made toward(s) clinical / shift goals:    Problem: Pain - Standard  Goal: Alleviation of pain or a reduction in pain to the patient’s comfort goal  Outcome: Progressing       Patient is not progressing towards the following goals:      Problem: Knowledge Deficit - Standard  Goal: Patient and family/care givers will demonstrate understanding of plan of care, disease process/condition, diagnostic tests and medications  Outcome: Not Progressing

## 2023-11-10 NOTE — CARE PLAN
Problem: Knowledge Deficit - Standard  Goal: Patient and family/care givers will demonstrate understanding of plan of care, disease process/condition, diagnostic tests and medications  Outcome: Progressing     Problem: Pain - Standard  Goal: Alleviation of pain or a reduction in pain to the patient’s comfort goal  Outcome: Progressing   The patient is Stable - Low risk of patient condition declining or worsening    Shift Goals  Clinical Goals: MRI  Patient Goals: comfort, resolution    Progress made toward(s) clinical / shift goals:  pt progressing toward goals    Patient is not progressing towards the following goals:

## 2023-11-10 NOTE — ASSESSMENT & PLAN NOTE
Continue IV steroids  Ophthalmology following, appreciate recommendations  Pending thyroid antibody labs

## 2023-11-10 NOTE — RESPIRATORY CARE
" COPD EDUCATION by COPD CLINICAL EDUCATOR  11/10/2023 at 7:05 AM by Mercedes Dent, RRT     Patient reviewed by COPD education team. Patient does not have a history or diagnosis of COPD and is a non-smoker.  Therefore, patient does not qualify for the COPD program.      COPD Screen       COPD Assessment  COPD Clinical Specialists ONLY  COPD Education Initiated: No--Quick Screen (No offical diagnosis of COPD,No PFT, quit smoking 2013, no respiratory medications. benign lung nodule.)  Interdisciplinary Rounds: Attendance at Rounds (30 Min)    PFT Results    No results found for: \"PFT\"    Meds to Beds                    "

## 2023-11-10 NOTE — DISCHARGE PLANNING
MD request records[facility], [facility],primary care, notes and previous MRI.    [facility] the [facility] and was told by the [facility] that they are closed for [facility] day and he can only said a d/c summary to the ED.  And RN aware. He directed me to call back Monday

## 2023-11-10 NOTE — PROGRESS NOTES
Hospital Medicine Daily Progress Note    Date of Service  11/10/2023    Chief Complaint  King Alegria is a 78 y.o. male admitted 11/8/2023 with left eye swelling    Hospital Course  78 y.o. male with past medical history of COPD, chronic hypoxia on 2 L, CAD, MI, hypertension, dyslipidemia, rheumatoid arthritis, hypothyroidism, and MDS/aplastic aplastic anemia who [facility] and requires [facility] transfusions presented to emergency department on 11/8/2023 with a 2-day history of bilateral eye pain and left eye swelling and was subsequently admitted for bilateral orbital cellulitis.    CTA head and orbit: Bilateral extraocular muscles stranding and enlargement.  Small fluid collection anterior and lateral to the left globe, likely a small abscess.  Hazy bilateral intraorbital fat stranding, greater on the left.  Bilateral proptosis, worse on the left side.   ERP Dr Reyes spoke to Dr. Mendoza.  Recommended empiric antibiotics for orbital cellulitis.  Formal ophthalmology consult expected tomorrow    11/10 Orbit/Neck/Neck MRI showed enlargement of the bilateral extraocular muscles with retrobulbar fat stranding in a pattern suggesting thyroid orbitopathy, left orbital cellulitis with left-sided proptosis, orbital edema, fat stranding and enlargement of extraocular muscles    Interval Problem Update  Evaluated at bedside  Reporting left eye swelling and still having pain  Noted for severe bilateral proptosis  11/8 blood cultures with no growth today  Continue Rocephin/vancomycin antibiotic regimen  Ophthalmology following  Continue 3-day IV steroid regimen, will switch to 1mg/kg oral prednisone  Ophthalmology organizing to initiate Teprotumumab infusions  [facility] records  Labs on AM    I have discussed this patient's plan of care and discharge plan at IDT rounds today with Case Management, Nursing, Nursing leadership, and other members of the IDT team.    Consultants/Specialty  Ophthalmology    Code  Status  Full Code    Disposition  The patient is not medically cleared for discharge to home or a post-acute facility.  Anticipate discharge to: home with close outpatient follow-up    I have placed the appropriate orders for post-discharge needs.    Review of Systems  Review of Systems   Constitutional: Negative.    HENT: Negative.     Eyes:  Positive for blurred vision, pain, discharge and redness.   Respiratory: Negative.     Cardiovascular: Negative.    Gastrointestinal: Negative.    Genitourinary: Negative.    Musculoskeletal: Negative.    Skin: Negative.    Neurological: Negative.    Endo/Heme/Allergies: Negative.    Psychiatric/Behavioral: Negative.          Physical Exam  Temp:  [36.2 °C (97.1 °F)-36.7 °C (98.1 °F)] 36.2 °C (97.1 °F)  Pulse:  [60-68] 62  Resp:  [14-17] 17  BP: (101-116)/(44-60) 116/53  SpO2:  [92 %-96 %] 95 %    Physical Exam  Constitutional:       General: He is not in acute distress.     Appearance: Normal appearance.   HENT:      Head: Normocephalic and atraumatic.      Nose: Nose normal. No congestion.      Mouth/Throat:      Mouth: Mucous membranes are moist.   Eyes:      General:         Right eye: Discharge present.         Left eye: Discharge present.     Conjunctiva/sclera:      Right eye: Right conjunctiva is injected. Chemosis present.      Left eye: Left conjunctiva is injected. Chemosis present.      Comments: Proptosis, worse on the left side.  Decreased ROM, worse on the left side.  Injection, chemosis and exudate, worse on the left side.    Cardiovascular:      Rate and Rhythm: Normal rate and regular rhythm.      Pulses: Normal pulses.      Heart sounds: Normal heart sounds.   Pulmonary:      Effort: Pulmonary effort is normal.      Breath sounds: Normal breath sounds.   Abdominal:      General: Bowel sounds are normal.      Palpations: Abdomen is soft.      Tenderness: There is no abdominal tenderness.   Musculoskeletal:         General: No swelling. Normal range of motion.       Cervical back: Normal range of motion and neck supple.   Skin:     General: Skin is warm.      Coloration: Skin is not jaundiced.   Neurological:      General: No focal deficit present.      Mental Status: He is alert and oriented to person, place, and time. Mental status is at baseline.      Cranial Nerves: No cranial nerve deficit.   Psychiatric:         Mood and Affect: Mood normal.         Behavior: Behavior normal.         Thought Content: Thought content normal.         Judgment: Judgment normal.           Fluids    Intake/Output Summary (Last 24 hours) at 11/10/2023 1554  Last data filed at 11/10/2023 1138  Gross per 24 hour   Intake 840 ml   Output 1050 ml   Net -210 ml         Laboratory  Recent Labs     11/08/23 1844 11/09/23  0350 11/10/23  0657   WBC 5.5 5.3 3.2*   RBC 2.25* 2.25* 2.31*   HEMOGLOBIN 8.1* 8.0* 8.2*   HEMATOCRIT 24.9* 25.0* 25.5*   .7* 111.1* 110.4*   MCH 36.0* 35.6* 35.5*   MCHC 32.5 32.0* 32.2*   RDW 77.9* 77.1* 76.5*   PLATELETCT 109* 118* 110*   MPV 9.6 9.6 9.9       Recent Labs     11/08/23 1844 11/09/23  0350 11/10/23  0657   SODIUM 133* 136 136   POTASSIUM 3.7 3.6 4.0   CHLORIDE 98 102 101   CO2 25 26 25   GLUCOSE 98 90 120*   BUN 13 10 14   CREATININE 0.63 0.58 0.74   CALCIUM 9.4 8.7 9.2                     Imaging  US-THYROID   Final Result      1.  No thyroid nodules or thyromegaly.   2.  Somewhat challenging exam due to thyroid location.      IR-US GUIDED PIV   Final Result    Ultrasound-guided PERIPHERAL IV INSERTION performed by    qualified nursing staff as above.      MR-ORBITS,FACE,NECK-WITH&W/O & SEQUENCES   Final Result         Enlargement of the bilateral extraocular muscles with retrobulbar fat stranding in a pattern suggesting thyroid orbitopathy.      Left ORBIT: Changes suspicious for orbital cellulitis with left-sided proptosis, orbital edema, fat stranding, enlargement of the extraocular muscles, particularly the left lateral rectus. Preseptal edema  is also noted on the left side. There is a thin 8    mm x 2 mm inflammatory fluid collection in the left anterolateral preseptal soft tissues at the level of the globe equator.      Right ORBIT: Enlargement of the extraocular muscles with mild edema of the retrobulbar fat and changes concerning for thyroid orbitopathy.         [id number] WITH   Final Result         1.  Marked enlargement of the bilateral extraocular muscles as described with adjacent hazy fat stranding. Consider changes related to underlying thyroid pathology or orbital myositis. Consider other causes of extraocular muscle enlargement as clinically    appropriate.   2.  Small enhancing fluid collection anterior lateral to the left globe, appearance most typical of small abscess.   3.  Slight hazy bilateral intraorbital fat stranding, greater on the left, could be secondary to underlying extraocular muscle pathology or changes of orbital cellulitis.   4.  Bilateral proptosis, left slightly greater than right.      CT-HEAD WITH & W/O   Final Result         1.  No acute intracranial abnormality.   2.  Mild atrophy and nonspecific white matter changes most commonly associated with small vessel ischemia.   3.  Atherosclerosis           Assessment/Plan  * Orbital cellulitis- (present on admission)  Assessment & Plan  Patient admitted for treatment for orbital cellulitis and small abscess anterolateral to the left globe.  Otherwise no intracranial abscess noted.  IV vancomycin and ceftriaxone chosen  Ophthalmology consult expected tomorrow, possibly with small abscess drain.  We will keep n.p.o.  Pain control as needed with IV and p.o. 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  As per patient, he missed a few days of levothyroxine.  Will increase dose of levothyroxine from 125 to 150 mcg    Chronic respiratory failure with hypoxia (HCC)- (present on admission)  Assessment & Plan  Currently on room air  Resolved    Rheumatoid arthritis (HCC)- (present on admission)  Assessment & Plan  Currently not on disease modifying medications.  Tylenol, oxycodone as needed    Thrombocytopenia (HCC)- (present on admission)  Assessment & Plan  Secondary to aplastic anemia    Chronic anemia- (present on admission)  Assessment & Plan  Secondary to aplastic anemia    Pancytopenia (HCC)- (present on admission)  Assessment & Plan  Secondary to history of MDS/aplastic anemia diagnoses and oncology notes noted on care everywhere  [facility] and was going to establish with [facility]  Has outpatient appointment for [facility]  Receives weekly blood transfusions as well as \"shots\"  Continue monitoring daily CBCs and CMP  [facility] records    Prostate cancer (HCC)- (present on admission)  Assessment & Plan  History of  Continue home meds  [facility]ending [facility] records         VTE prophylaxis: SCDs, possible OR on a.m.    I have performed a physical exam and reviewed and updated ROS and Plan today (11/10/2023). In review of yesterday's note (11/9/2023), there are no changes except as documented above.    Greater than 51 minutes spent prepping to see patient (e.g. review of tests) obtaining and/or reviewing separately " obtained history. Performing a medically appropriate examination and/ evaluation.  Counseling and educating the patient/family/caregiver.  Ordering medications, tests, or procedures.  Referring and communicating with other health care professionals.  Documenting clinical information in EPIC.  Independently interpreting results and communicating results to patient/family/caregiver.  Care coordination

## 2023-11-10 NOTE — ASSESSMENT & PLAN NOTE
"Secondary to history of MDS/aplastic anemia diagnoses and oncology notes noted on care everywhere tab  [facility] and was going to establish with [facility]  Has outpatient appointment for [facility]  Receives weekly blood transfusions as well as \"shots\"  Continue monitoring daily CBCs and CMP  [facility] records  "

## 2023-11-10 NOTE — ASSESSMENT & PLAN NOTE
"Secondary to history of MDS/aplastic anemia diagnoses and oncology notes noted on care everywhere  [facility] and was going to establish with [facility]  Has outpatient appointment for [facility]  Receives weekly blood transfusions as well as \"shots\"  Continue monitoring daily CBCs and CMP  [facility]ending [facility] records  "

## 2023-11-11 LAB
ALBUMIN SERPL BCP-MCNC: 3.2 G/DL (ref 3.2–4.9)
ALBUMIN/GLOB SERPL: 0.8 G/DL
ALP SERPL-CCNC: 72 U/L (ref 30–99)
ALT SERPL-CCNC: 20 U/L (ref 2–50)
ANION GAP SERPL CALC-SCNC: 8 MMOL/L (ref 7–16)
AST SERPL-CCNC: 20 U/L (ref 12–45)
BASOPHILS # BLD AUTO: 0 % (ref 0–1.8)
BASOPHILS # BLD: 0 K/UL (ref 0–0.12)
BILIRUB SERPL-MCNC: 0.2 MG/DL (ref 0.1–1.5)
BUN SERPL-MCNC: 20 MG/DL (ref 8–22)
CALCIUM ALBUM COR SERPL-MCNC: 10 MG/DL (ref 8.5–10.5)
CALCIUM SERPL-MCNC: 9.4 MG/DL (ref 8.5–10.5)
CHLORIDE SERPL-SCNC: 104 MMOL/L (ref 96–112)
CO2 SERPL-SCNC: 24 MMOL/L (ref 20–33)
CREAT SERPL-MCNC: 0.77 MG/DL (ref 0.5–1.4)
EOSINOPHIL # BLD AUTO: 0 K/UL (ref 0–0.51)
EOSINOPHIL NFR BLD: 0 % (ref 0–6.9)
ERYTHROCYTE [DISTWIDTH] IN BLOOD BY AUTOMATED COUNT: 75.8 FL (ref 35.9–50)
GFR SERPLBLD CREATININE-BSD FMLA CKD-EPI: 92 ML/MIN/1.73 M 2
GLOBULIN SER CALC-MCNC: 4 G/DL (ref 1.9–3.5)
GLUCOSE SERPL-MCNC: 136 MG/DL (ref 65–99)
HCT VFR BLD AUTO: 23.5 % (ref 42–52)
HGB BLD-MCNC: 7.6 G/DL (ref 14–18)
HGB BLD-MCNC: 8.5 G/DL (ref 14–18)
IMM GRANULOCYTES # BLD AUTO: 0.02 K/UL (ref 0–0.11)
IMM GRANULOCYTES NFR BLD AUTO: 0.5 % (ref 0–0.9)
LYMPHOCYTES # BLD AUTO: 0.19 K/UL (ref 1–4.8)
LYMPHOCYTES NFR BLD: 5.1 % (ref 22–41)
MCH RBC QN AUTO: 35.8 PG (ref 27–33)
MCHC RBC AUTO-ENTMCNC: 32.3 G/DL (ref 32.3–36.5)
MCV RBC AUTO: 110.8 FL (ref 81.4–97.8)
MONOCYTES # BLD AUTO: 0.41 K/UL (ref 0–0.85)
MONOCYTES NFR BLD AUTO: 11 % (ref 0–13.4)
NEUTROPHILS # BLD AUTO: 3.1 K/UL (ref 1.82–7.42)
NEUTROPHILS NFR BLD: 83.4 % (ref 44–72)
NRBC # BLD AUTO: 0 K/UL
NRBC BLD-RTO: 0 /100 WBC (ref 0–0.2)
PLATELET # BLD AUTO: 117 K/UL (ref 164–446)
PLATELETS.RETICULATED NFR BLD AUTO: 1.9 % (ref 0.6–13.1)
PMV BLD AUTO: 9.9 FL (ref 9–12.9)
POTASSIUM SERPL-SCNC: 4.9 MMOL/L (ref 3.6–5.5)
PROT SERPL-MCNC: 7.2 G/DL (ref 6–8.2)
RBC # BLD AUTO: 2.12 M/UL (ref 4.7–6.1)
SODIUM SERPL-SCNC: 136 MMOL/L (ref 135–145)
WBC # BLD AUTO: 3.7 K/UL (ref 4.8–10.8)

## 2023-11-11 PROCEDURE — 700105 HCHG RX REV CODE 258: Performed by: STUDENT IN AN ORGANIZED HEALTH CARE EDUCATION/TRAINING PROGRAM

## 2023-11-11 PROCEDURE — 700111 HCHG RX REV CODE 636 W/ 250 OVERRIDE (IP): Performed by: STUDENT IN AN ORGANIZED HEALTH CARE EDUCATION/TRAINING PROGRAM

## 2023-11-11 PROCEDURE — 700102 HCHG RX REV CODE 250 W/ 637 OVERRIDE(OP): Performed by: INTERNAL MEDICINE

## 2023-11-11 PROCEDURE — 85055 RETICULATED PLATELET ASSAY: CPT

## 2023-11-11 PROCEDURE — 770006 HCHG ROOM/CARE - MED/SURG/GYN SEMI*

## 2023-11-11 PROCEDURE — A9270 NON-COVERED ITEM OR SERVICE: HCPCS | Performed by: INTERNAL MEDICINE

## 2023-11-11 PROCEDURE — 99232 SBSQ HOSP IP/OBS MODERATE 35: CPT | Performed by: STUDENT IN AN ORGANIZED HEALTH CARE EDUCATION/TRAINING PROGRAM

## 2023-11-11 PROCEDURE — 85018 HEMOGLOBIN: CPT

## 2023-11-11 PROCEDURE — 700105 HCHG RX REV CODE 258: Performed by: INTERNAL MEDICINE

## 2023-11-11 PROCEDURE — 36415 COLL VENOUS BLD VENIPUNCTURE: CPT

## 2023-11-11 PROCEDURE — 700111 HCHG RX REV CODE 636 W/ 250 OVERRIDE (IP): Mod: JZ | Performed by: INTERNAL MEDICINE

## 2023-11-11 PROCEDURE — 80053 COMPREHEN METABOLIC PANEL: CPT

## 2023-11-11 PROCEDURE — 85025 COMPLETE CBC W/AUTO DIFF WBC: CPT

## 2023-11-11 RX ADMIN — DOCUSATE SODIUM 50 MG AND SENNOSIDES 8.6 MG 2 TABLET: 8.6; 5 TABLET, FILM COATED ORAL at 05:47

## 2023-11-11 RX ADMIN — POTASSIUM CHLORIDE 20 MEQ: 1500 TABLET, EXTENDED RELEASE ORAL at 05:46

## 2023-11-11 RX ADMIN — METHYLPREDNISOLONE SODIUM SUCCINATE 1000 MG: 1 INJECTION, POWDER, LYOPHILIZED, FOR SOLUTION INTRAMUSCULAR; INTRAVENOUS at 13:00

## 2023-11-11 RX ADMIN — CYANOCOBALAMIN TAB 500 MCG 1000 MCG: 500 TAB at 05:46

## 2023-11-11 RX ADMIN — ATENOLOL 12.5 MG: 25 TABLET ORAL at 05:46

## 2023-11-11 RX ADMIN — LEVOTHYROXINE SODIUM 150 MCG: 0.15 TABLET ORAL at 05:46

## 2023-11-11 RX ADMIN — CEFTRIAXONE SODIUM 2000 MG: 10 INJECTION, POWDER, FOR SOLUTION INTRAVENOUS at 05:47

## 2023-11-11 RX ADMIN — DOCUSATE SODIUM 50 MG AND SENNOSIDES 8.6 MG 2 TABLET: 8.6; 5 TABLET, FILM COATED ORAL at 17:42

## 2023-11-11 RX ADMIN — ATORVASTATIN CALCIUM 80 MG: 80 TABLET, FILM COATED ORAL at 20:26

## 2023-11-11 RX ADMIN — Medication 500 MG: at 05:46

## 2023-11-11 RX ADMIN — ENOXAPARIN SODIUM 40 MG: 100 INJECTION SUBCUTANEOUS at 17:42

## 2023-11-11 RX ADMIN — CETIRIZINE HYDROCHLORIDE 10 MG: 10 TABLET, FILM COATED ORAL at 05:46

## 2023-11-11 RX ADMIN — VANCOMYCIN HYDROCHLORIDE 1000 MG: 5 INJECTION, POWDER, LYOPHILIZED, FOR SOLUTION INTRAVENOUS at 20:20

## 2023-11-11 RX ADMIN — OXYBUTYNIN CHLORIDE 5 MG: 5 TABLET ORAL at 20:26

## 2023-11-11 RX ADMIN — Medication 1000 UNITS: at 05:46

## 2023-11-11 RX ADMIN — ASPIRIN 81 MG: 81 TABLET, COATED ORAL at 20:26

## 2023-11-11 RX ADMIN — PYRIDOXINE HCL TAB 50 MG 50 MG: 50 TAB at 05:47

## 2023-11-11 RX ADMIN — TAMSULOSIN HYDROCHLORIDE 0.4 MG: 0.4 CAPSULE ORAL at 05:46

## 2023-11-11 RX ADMIN — FERROUS SULFATE TAB 325 MG (65 MG ELEMENTAL FE) 325 MG: 325 (65 FE) TAB at 20:26

## 2023-11-11 ASSESSMENT — PAIN DESCRIPTION - PAIN TYPE: TYPE: ACUTE PAIN

## 2023-11-11 ASSESSMENT — ENCOUNTER SYMPTOMS
CARDIOVASCULAR NEGATIVE: 1
BLURRED VISION: 1
EYE DISCHARGE: 1
GASTROINTESTINAL NEGATIVE: 1
RESPIRATORY NEGATIVE: 1
EYE REDNESS: 1
PSYCHIATRIC NEGATIVE: 1
MUSCULOSKELETAL NEGATIVE: 1
EYE PAIN: 1
NEUROLOGICAL NEGATIVE: 1
CONSTITUTIONAL NEGATIVE: 1

## 2023-11-11 NOTE — PROGRESS NOTES
Hospital Medicine Daily Progress Note    Date of Service  11/11/2023    Chief Complaint  King Alegria is a 78 y.o. male admitted 11/8/2023 with left eye swelling    Hospital Course  78 y.o. male with past medical history of COPD, chronic hypoxia on 2 L, CAD, MI, hypertension, dyslipidemia, rheumatoid arthritis, hypothyroidism, and MDS/aplastic aplastic anemia who follows [facility] and requires weekly transfusions presented to emergency department on 11/8/2023 with a 2-day history of bilateral eye pain and left eye swelling and was subsequently admitted for bilateral orbital cellulitis.    CTA head and orbit: Bilateral extraocular muscles stranding and enlargement.  Small fluid collection anterior and lateral to the left globe, likely a small abscess.  Hazy bilateral intraorbital fat stranding, greater on the left.  Bilateral proptosis, worse on the left side.   ERP Dr Reyes spoke to Dr. Mendoza.  Recommended empiric antibiotics for orbital cellulitis.  Formal ophthalmology consult expected tomorrow    11/10 Orbit/Neck/Neck MRI showed enlargement of the bilateral extraocular muscles with retrobulbar fat stranding in a pattern suggesting thyroid orbitopathy, left orbital cellulitis with left-sided proptosis, orbital edema, fat stranding and enlargement of extraocular muscles    Interval Problem Update  Evaluated at bedside  Marked improvement with bilateral eye swelling and left eye erythema  11/8 blood cultures with no growth today  Continue Rocephin/Vancomycin antibiotic regimen  Switch to 1mg/kg oral prednisone  Ophthalmology following  [facility] records  Labs on AM    I have discussed this patient's plan of care and discharge plan at IDT rounds today with Case Management, Nursing, Nursing leadership, and other members of the IDT team.    Consultants/Specialty  Ophthalmology    Code Status  Full Code    Disposition  The patient is not medically cleared for discharge to home or a post-acute  facility.  Anticipate discharge to: home with close outpatient follow-up    I have placed the appropriate orders for post-discharge needs.    Review of Systems  Review of Systems   Constitutional: Negative.    HENT: Negative.     Eyes:  Positive for blurred vision, pain, discharge and redness.   Respiratory: Negative.     Cardiovascular: Negative.    Gastrointestinal: Negative.    Genitourinary: Negative.    Musculoskeletal: Negative.    Skin: Negative.    Neurological: Negative.    Endo/Heme/Allergies: Negative.    Psychiatric/Behavioral: Negative.          Physical Exam  Temp:  [36.2 °C (97.2 °F)-36.3 °C (97.4 °F)] 36.2 °C (97.2 °F)  Pulse:  [60-62] 62  Resp:  [16-18] 18  BP: ()/(41-57) 101/41  SpO2:  [94 %-100 %] 100 %    Physical Exam  Constitutional:       General: He is not in acute distress.     Appearance: Normal appearance.   HENT:      Head: Normocephalic and atraumatic.      Nose: Nose normal. No congestion.      Mouth/Throat:      Mouth: Mucous membranes are moist.   Eyes:      General:         Right eye: Discharge present.         Left eye: Discharge present.     Conjunctiva/sclera:      Right eye: Right conjunctiva is injected. Chemosis present.      Left eye: Left conjunctiva is injected. Chemosis present.      Comments: Proptosis, worse on the left side.  Decreased ROM, worse on the left side.  Injection, chemosis and exudate, worse on the left side.    Cardiovascular:      Rate and Rhythm: Normal rate and regular rhythm.      Pulses: Normal pulses.      Heart sounds: Normal heart sounds.   Pulmonary:      Effort: Pulmonary effort is normal.      Breath sounds: Normal breath sounds.   Abdominal:      General: Bowel sounds are normal.      Palpations: Abdomen is soft.      Tenderness: There is no abdominal tenderness.   Musculoskeletal:         General: No swelling. Normal range of motion.      Cervical back: Normal range of motion and neck supple.   Skin:     General: Skin is warm.       Coloration: Skin is not jaundiced.   Neurological:      General: No focal deficit present.      Mental Status: He is alert and oriented to person, place, and time. Mental status is at baseline.      Cranial Nerves: No cranial nerve deficit.   Psychiatric:         Mood and Affect: Mood normal.         Behavior: Behavior normal.         Thought Content: Thought content normal.         Judgment: Judgment normal.           Fluids    Intake/Output Summary (Last 24 hours) at 11/11/2023 1513  Last data filed at 11/11/2023 0111  Gross per 24 hour   Intake 600 ml   Output 1600 ml   Net -1000 ml       Laboratory  Recent Labs     11/09/23  0350 11/10/23  0657 11/11/23  0408   WBC 5.3 3.2* 3.7*   RBC 2.25* 2.31* 2.12*   HEMOGLOBIN 8.0* 8.2* 7.6*   HEMATOCRIT 25.0* 25.5* 23.5*   .1* 110.4* 110.8*   MCH 35.6* 35.5* 35.8*   MCHC 32.0* 32.2* 32.3   RDW 77.1* 76.5* 75.8*   PLATELETCT 118* 110* 117*   MPV 9.6 9.9 9.9     Recent Labs     11/09/23  0350 11/10/23  0657 11/11/23  0408   SODIUM 136 136 136   POTASSIUM 3.6 4.0 4.9   CHLORIDE 102 101 104   CO2 26 25 24   GLUCOSE 90 120* 136*   BUN 10 14 20   CREATININE 0.58 0.74 0.77   CALCIUM 8.7 9.2 9.4                   Imaging  US-THYROID   Final Result      1.  No thyroid nodules or thyromegaly.   2.  Somewhat challenging exam due to thyroid location.      IR-US GUIDED PIV   Final Result    Ultrasound-guided PERIPHERAL IV INSERTION performed by    qualified nursing staff as above.      MR-ORBITS,FACE,NECK-WITH&W/O & SEQUENCES   Final Result         Enlargement of the bilateral extraocular muscles with retrobulbar fat stranding in a pattern suggesting thyroid orbitopathy.      Left ORBIT: Changes suspicious for orbital cellulitis with left-sided proptosis, orbital edema, fat stranding, enlargement of the extraocular muscles, particularly the left lateral rectus. Preseptal edema is also noted on the left side. There is a thin 8    mm x 2 mm inflammatory fluid collection in the  left anterolateral preseptal soft tissues at the level of the globe equator.      Right ORBIT: Enlargement of the extraocular muscles with mild edema of the retrobulbar fat and changes concerning for thyroid orbitopathy.         [id number] WITH   Final Result         1.  Marked enlargement of the bilateral extraocular muscles as described with adjacent hazy fat stranding. Consider changes related to underlying thyroid pathology or orbital myositis. Consider other causes of extraocular muscle enlargement as clinically    appropriate.   2.  Small enhancing fluid collection anterior lateral to the left globe, appearance most typical of small abscess.   3.  Slight hazy bilateral intraorbital fat stranding, greater on the left, could be secondary to underlying extraocular muscle pathology or changes of orbital cellulitis.   4.  Bilateral proptosis, left slightly greater than right.      CT-HEAD WITH & W/O   Final Result         1.  No acute intracranial abnormality.   2.  Mild atrophy and nonspecific white matter changes most commonly associated with small vessel ischemia.   3.  Atherosclerosis           Assessment/Plan  * Orbital cellulitis- (present on admission)  Assessment & Plan  Patient admitted for treatment for orbital cellulitis and small abscess anterolateral to the left globe.  Otherwise no intracranial abscess noted.  IV vancomycin and ceftriaxone chosen  Ophthalmology consult expected tomorrow, possibly with small abscess drain.  We will keep n.p.o.  Pain control as needed with IV and p.o. 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  As per patient, he missed a few days of levothyroxine.  Will increase dose of levothyroxine from 125 to 150 mcg    Chronic respiratory failure with hypoxia (HCC)- (present on admission)  Assessment & Plan  Currently on room air  Resolved    Rheumatoid arthritis (HCC)- (present on admission)  Assessment & Plan  Currently not on disease modifying medications.  Tylenol, oxycodone as needed    Thrombocytopenia (HCC)- (present on admission)  Assessment & Plan  Secondary to aplastic anemia    Chronic anemia- (present on admission)  Assessment & Plan  Secondary to aplastic anemia    Pancytopenia (HCC)- (present on admission)  Assessment & Plan  Secondary to history of MDS/aplastic anemia diagnoses and oncology notes noted on care everywhere  [facility] and was going to establish [facility]  Has outpatient appointment for [facility]  Receives weekly blood transfusions as well as \"shots\"  Continue monitoring daily CBCs and CMP  [facility]    Prostate cancer (HCC)- (present on admission)  Assessment & Plan  History of  Continue home meds  [facility]         VTE prophylaxis:     I have performed a physical exam and reviewed and updated ROS and Plan today (11/11/2023). In review of yesterday's note (11/10/2023), there are no changes except as documented above.      "

## 2023-11-11 NOTE — CARE PLAN
The patient is Stable - Low risk of patient condition declining or worsening    Shift Goals  Clinical Goals: Cellulitis mgmt  Patient Goals: comfort, resolution    Progress made toward(s) clinical / shift goals:    Problem: Knowledge Deficit - Standard  Goal: Patient and family/care givers will demonstrate understanding of plan of care, disease process/condition, diagnostic tests and medications  Outcome: Progressing     Problem: Pain - Standard  Goal: Alleviation of pain or a reduction in pain to the patient’s comfort goal  Outcome: Progressing       Patient is not progressing towards the following goals:

## 2023-11-11 NOTE — CARE PLAN
The patient is Stable - Low risk of patient condition declining or worsening    Shift Goals  Clinical Goals: monitor eye swelling, iv abx  Patient Goals: comfort    Progress made toward(s) clinical / shift goals:    Problem: Knowledge Deficit - Standard  Goal: Patient and family/care givers will demonstrate understanding of plan of care, disease process/condition, diagnostic tests and medications  Outcome: Progressing       Patient is aaox4, reports slight discomfort at L and R eye - L eye swelling seems to be better, but patient continues to reports blurry/double vision from both eyes. Encouraged to use call light for assistance, frequent rounds complete.

## 2023-11-12 LAB
ALBUMIN SERPL BCP-MCNC: 3 G/DL (ref 3.2–4.9)
ALBUMIN/GLOB SERPL: 0.7 G/DL
ALP SERPL-CCNC: 68 U/L (ref 30–99)
ALT SERPL-CCNC: 29 U/L (ref 2–50)
ANION GAP SERPL CALC-SCNC: 10 MMOL/L (ref 7–16)
AST SERPL-CCNC: 25 U/L (ref 12–45)
BASOPHILS # BLD AUTO: 0 % (ref 0–1.8)
BASOPHILS # BLD: 0 K/UL (ref 0–0.12)
BILIRUB SERPL-MCNC: 0.2 MG/DL (ref 0.1–1.5)
BUN SERPL-MCNC: 21 MG/DL (ref 8–22)
CALCIUM ALBUM COR SERPL-MCNC: 10 MG/DL (ref 8.5–10.5)
CALCIUM SERPL-MCNC: 9.2 MG/DL (ref 8.5–10.5)
CHLORIDE SERPL-SCNC: 107 MMOL/L (ref 96–112)
CO2 SERPL-SCNC: 23 MMOL/L (ref 20–33)
CREAT SERPL-MCNC: 0.81 MG/DL (ref 0.5–1.4)
EOSINOPHIL # BLD AUTO: 0 K/UL (ref 0–0.51)
EOSINOPHIL NFR BLD: 0 % (ref 0–6.9)
ERYTHROCYTE [DISTWIDTH] IN BLOOD BY AUTOMATED COUNT: 82.1 FL (ref 35.9–50)
GFR SERPLBLD CREATININE-BSD FMLA CKD-EPI: 91 ML/MIN/1.73 M 2
GLOBULIN SER CALC-MCNC: 4.4 G/DL (ref 1.9–3.5)
GLUCOSE SERPL-MCNC: 108 MG/DL (ref 65–99)
HCT VFR BLD AUTO: 25.9 % (ref 42–52)
HGB BLD-MCNC: 7.5 G/DL (ref 14–18)
HGB BLD-MCNC: 8 G/DL (ref 14–18)
IMM GRANULOCYTES # BLD AUTO: 0.07 K/UL (ref 0–0.11)
IMM GRANULOCYTES NFR BLD AUTO: 1.6 % (ref 0–0.9)
LYMPHOCYTES # BLD AUTO: 0.21 K/UL (ref 1–4.8)
LYMPHOCYTES NFR BLD: 4.7 % (ref 22–41)
MCH RBC QN AUTO: 35.9 PG (ref 27–33)
MCHC RBC AUTO-ENTMCNC: 30.9 G/DL (ref 32.3–36.5)
MCV RBC AUTO: 116.1 FL (ref 81.4–97.8)
MONOCYTES # BLD AUTO: 0.35 K/UL (ref 0–0.85)
MONOCYTES NFR BLD AUTO: 7.8 % (ref 0–13.4)
NEUTROPHILS # BLD AUTO: 3.85 K/UL (ref 1.82–7.42)
NEUTROPHILS NFR BLD: 85.9 % (ref 44–72)
NRBC # BLD AUTO: 0 K/UL
NRBC BLD-RTO: 0 /100 WBC (ref 0–0.2)
PLATELET # BLD AUTO: 113 K/UL (ref 164–446)
PLATELETS.RETICULATED NFR BLD AUTO: 3.1 % (ref 0.6–13.1)
PMV BLD AUTO: 9.6 FL (ref 9–12.9)
POTASSIUM SERPL-SCNC: 4.5 MMOL/L (ref 3.6–5.5)
PROT SERPL-MCNC: 7.4 G/DL (ref 6–8.2)
RBC # BLD AUTO: 2.23 M/UL (ref 4.7–6.1)
SODIUM SERPL-SCNC: 140 MMOL/L (ref 135–145)
TSH RECEP AB SER-ACNC: <0.8 IU/L
WBC # BLD AUTO: 4.5 K/UL (ref 4.8–10.8)

## 2023-11-12 PROCEDURE — 99232 SBSQ HOSP IP/OBS MODERATE 35: CPT | Performed by: STUDENT IN AN ORGANIZED HEALTH CARE EDUCATION/TRAINING PROGRAM

## 2023-11-12 PROCEDURE — 85025 COMPLETE CBC W/AUTO DIFF WBC: CPT

## 2023-11-12 PROCEDURE — 700111 HCHG RX REV CODE 636 W/ 250 OVERRIDE (IP): Performed by: STUDENT IN AN ORGANIZED HEALTH CARE EDUCATION/TRAINING PROGRAM

## 2023-11-12 PROCEDURE — 700102 HCHG RX REV CODE 250 W/ 637 OVERRIDE(OP): Performed by: INTERNAL MEDICINE

## 2023-11-12 PROCEDURE — 700105 HCHG RX REV CODE 258: Performed by: INTERNAL MEDICINE

## 2023-11-12 PROCEDURE — 80053 COMPREHEN METABOLIC PANEL: CPT

## 2023-11-12 PROCEDURE — 770006 HCHG ROOM/CARE - MED/SURG/GYN SEMI*

## 2023-11-12 PROCEDURE — 85055 RETICULATED PLATELET ASSAY: CPT

## 2023-11-12 PROCEDURE — A9270 NON-COVERED ITEM OR SERVICE: HCPCS | Performed by: INTERNAL MEDICINE

## 2023-11-12 PROCEDURE — 85018 HEMOGLOBIN: CPT

## 2023-11-12 PROCEDURE — 700111 HCHG RX REV CODE 636 W/ 250 OVERRIDE (IP): Mod: JZ | Performed by: INTERNAL MEDICINE

## 2023-11-12 RX ADMIN — CEFTRIAXONE SODIUM 2000 MG: 10 INJECTION, POWDER, FOR SOLUTION INTRAVENOUS at 05:41

## 2023-11-12 RX ADMIN — CYANOCOBALAMIN TAB 500 MCG 1000 MCG: 500 TAB at 05:54

## 2023-11-12 RX ADMIN — ATORVASTATIN CALCIUM 80 MG: 80 TABLET, FILM COATED ORAL at 22:12

## 2023-11-12 RX ADMIN — DOCUSATE SODIUM 50 MG AND SENNOSIDES 8.6 MG 2 TABLET: 8.6; 5 TABLET, FILM COATED ORAL at 18:38

## 2023-11-12 RX ADMIN — TAMSULOSIN HYDROCHLORIDE 0.4 MG: 0.4 CAPSULE ORAL at 05:52

## 2023-11-12 RX ADMIN — POTASSIUM CHLORIDE 20 MEQ: 1500 TABLET, EXTENDED RELEASE ORAL at 05:57

## 2023-11-12 RX ADMIN — ATENOLOL 12.5 MG: 25 TABLET ORAL at 06:00

## 2023-11-12 RX ADMIN — PREDNISONE 60 MG: 50 TABLET ORAL at 05:54

## 2023-11-12 RX ADMIN — Medication 1000 UNITS: at 05:54

## 2023-11-12 RX ADMIN — PYRIDOXINE HCL TAB 50 MG 50 MG: 50 TAB at 05:55

## 2023-11-12 RX ADMIN — DOCUSATE SODIUM 50 MG AND SENNOSIDES 8.6 MG 2 TABLET: 8.6; 5 TABLET, FILM COATED ORAL at 05:56

## 2023-11-12 RX ADMIN — ENOXAPARIN SODIUM 40 MG: 100 INJECTION SUBCUTANEOUS at 18:38

## 2023-11-12 RX ADMIN — ASPIRIN 81 MG: 81 TABLET, COATED ORAL at 22:12

## 2023-11-12 RX ADMIN — CETIRIZINE HYDROCHLORIDE 10 MG: 10 TABLET, FILM COATED ORAL at 05:55

## 2023-11-12 RX ADMIN — VANCOMYCIN HYDROCHLORIDE 1000 MG: 5 INJECTION, POWDER, LYOPHILIZED, FOR SOLUTION INTRAVENOUS at 22:14

## 2023-11-12 RX ADMIN — OXYBUTYNIN CHLORIDE 5 MG: 5 TABLET ORAL at 22:12

## 2023-11-12 RX ADMIN — FERROUS SULFATE TAB 325 MG (65 MG ELEMENTAL FE) 325 MG: 325 (65 FE) TAB at 22:12

## 2023-11-12 RX ADMIN — Medication 500 MG: at 05:41

## 2023-11-12 RX ADMIN — LEVOTHYROXINE SODIUM 150 MCG: 0.15 TABLET ORAL at 05:53

## 2023-11-12 ASSESSMENT — PAIN DESCRIPTION - PAIN TYPE: TYPE: ACUTE PAIN

## 2023-11-12 ASSESSMENT — ENCOUNTER SYMPTOMS
CARDIOVASCULAR NEGATIVE: 1
BLURRED VISION: 1
PSYCHIATRIC NEGATIVE: 1
EYE PAIN: 1
EYE DISCHARGE: 1
NEUROLOGICAL NEGATIVE: 1
EYE REDNESS: 1
RESPIRATORY NEGATIVE: 1
CONSTITUTIONAL NEGATIVE: 1
MUSCULOSKELETAL NEGATIVE: 1
GASTROINTESTINAL NEGATIVE: 1

## 2023-11-12 NOTE — PROGRESS NOTES
Pt. Has less edema today, redness has reduced, but blurred and double vision remains in both eyes, per pt. Report.  Pt. Had a BM today.  Possible D/C for  tomorrow.

## 2023-11-12 NOTE — CARE PLAN
Problem: Knowledge Deficit - Standard  Goal: Patient and family/care givers will demonstrate understanding of plan of care, disease process/condition, diagnostic tests and medications  Outcome: Not Progressing     Problem: Pain - Standard  Goal: Alleviation of pain or a reduction in pain to the patient’s comfort goal  Outcome: Not Progressing   The patient is Stable - Low risk of patient condition declining or worsening    Shift Goals  Clinical Goals: Monitor Left eye swelling/s/sx  Patient Goals: comfort  Family Goals: jeremy    Progress made toward(s) clinical / shift goals:  Pt. Has less edema today, redness has reduced, but blurred and double vision remains in both eyes, per pt. Report.  Pt. Had a BM today.  Possible D/C for  tomorrow.    Patient is not progressing towards the following goals:    Problem: Knowledge Deficit - Standard  Goal: Patient and family/care givers will demonstrate understanding of plan of care, disease process/condition, diagnostic tests and medications  Outcome: Not Progressing     Problem: Pain - Standard  Goal: Alleviation of pain or a reduction in pain to the patient’s comfort goal  Outcome: Not Progressing

## 2023-11-12 NOTE — CARE PLAN
The patient is Stable - Low risk of patient condition declining or worsening    Shift Goals  Clinical Goals: Pt's pain on left eye will be <3 throughout the shift  Patient Goals: Sleep and rest  Family Goals: jeremy    Progress made toward(s) clinical / shift goals:      Patient is alert and oriented x4; able to communicate needs. Pt's left eye edema has subsided and redness has reduced. Pt still have blurred vision on both eyes and is hand-held assist to the bathroom for safety. Pt's pain in the left eye is controlled with non-pharmacological intervention, rest and wet warm washcloth. Pt's bed in low position, bed alarm is on and call light within reach. Patient's Hgb taken at 21:00 result at 8.5

## 2023-11-12 NOTE — PROGRESS NOTES
Hospital Medicine Daily Progress Note    Date of Service  11/12/2023    Chief Complaint  King Alegria is a 78 y.o. male admitted 11/8/2023 with left eye swelling    Hospital Course  78 y.o. male with past medical history of COPD, chronic hypoxia on 2 L, CAD, MI, hypertension, dyslipidemia, rheumatoid arthritis, hypothyroidism, and MDS/aplastic aplastic anemia who [facility] and requires weekly transfusions presented to emergency department on 11/8/2023 with a 2-day history of bilateral eye pain and left eye swelling and was subsequently admitted for bilateral orbital cellulitis.    CTA head and orbit: Bilateral extraocular muscles stranding and enlargement.  Small fluid collection anterior and lateral to the left globe, likely a small abscess.  Hazy bilateral intraorbital fat stranding, greater on the left.  Bilateral proptosis, worse on the left side.   ERP Dr Reyes spoke to Dr. Mendoza.  Recommended empiric antibiotics for orbital cellulitis.  Formal ophthalmology consult expected tomorrow    11/10 Orbit/Neck/Neck MRI showed enlargement of the bilateral extraocular muscles with retrobulbar fat stranding in a pattern suggesting thyroid orbitopathy, left orbital cellulitis with left-sided proptosis, orbital edema, fat stranding and enlargement of extraocular muscles.    11/8 blood cultures with no growth today    TPO Abs high  Thyrotropin Receptor Abs low    Interval Problem Update  Evaluated at bedside  Less left eye swelling today  Still having blurry vision  Hgb 7-8  Continue Rocephin/Vancomycin antibiotic regimen  Continue oral prednisone  Ophthalmology following, organizing to initiate Teprotumumab infusions   [facility] records  Labs on AM    I have discussed this patient's plan of care and discharge plan at IDT rounds today with Case Management, Nursing, Nursing leadership, and other members of the IDT team.    Consultants/Specialty  Ophthalmology    Code Status  Full Code    Disposition  The  patient is not medically cleared for discharge to home or a post-acute facility.  Anticipate discharge to: home with close outpatient follow-up    I have placed the appropriate orders for post-discharge needs.    Review of Systems  Review of Systems   Constitutional: Negative.    HENT: Negative.     Eyes:  Positive for blurred vision, pain, discharge and redness.   Respiratory: Negative.     Cardiovascular: Negative.    Gastrointestinal: Negative.    Genitourinary: Negative.    Musculoskeletal: Negative.    Skin: Negative.    Neurological: Negative.    Endo/Heme/Allergies: Negative.    Psychiatric/Behavioral: Negative.          Physical Exam  Temp:  [36.1 °C (96.9 °F)-36.4 °C (97.6 °F)] 36.1 °C (96.9 °F)  Pulse:  [62-70] 70  Resp:  [16-18] 18  BP: (105-126)/(52-62) 126/53  SpO2:  [91 %-95 %] 91 %    Physical Exam  Constitutional:       General: He is not in acute distress.     Appearance: Normal appearance.   HENT:      Head: Normocephalic and atraumatic.      Nose: Nose normal. No congestion.      Mouth/Throat:      Mouth: Mucous membranes are moist.   Eyes:      General:         Right eye: Discharge present.         Left eye: Discharge present.     Conjunctiva/sclera:      Right eye: Right conjunctiva is injected. Chemosis present.      Left eye: Left conjunctiva is injected. Chemosis present.      Comments: Proptosis, worse on the left side.  Decreased ROM, worse on the left side.  Injection, chemosis and exudate, worse on the left side.    Cardiovascular:      Rate and Rhythm: Normal rate and regular rhythm.      Pulses: Normal pulses.      Heart sounds: Normal heart sounds.   Pulmonary:      Effort: Pulmonary effort is normal.      Breath sounds: Normal breath sounds.   Abdominal:      General: Bowel sounds are normal.      Palpations: Abdomen is soft.      Tenderness: There is no abdominal tenderness.   Musculoskeletal:         General: No swelling. Normal range of motion.      Cervical back: Normal range of  motion and neck supple.   Skin:     General: Skin is warm.      Coloration: Skin is not jaundiced.   Neurological:      General: No focal deficit present.      Mental Status: He is alert and oriented to person, place, and time. Mental status is at baseline.      Cranial Nerves: No cranial nerve deficit.   Psychiatric:         Mood and Affect: Mood normal.         Behavior: Behavior normal.         Thought Content: Thought content normal.         Judgment: Judgment normal.           Fluids    Intake/Output Summary (Last 24 hours) at 11/12/2023 0847  Last data filed at 11/12/2023 0437  Gross per 24 hour   Intake 1240 ml   Output 1550 ml   Net -310 ml         Laboratory  Recent Labs     11/10/23  0657 11/11/23  0408 11/11/23  2106 11/12/23  0458   WBC 3.2* 3.7*  --  4.5*   RBC 2.31* 2.12*  --  2.23*   HEMOGLOBIN 8.2* 7.6* 8.5* 8.0*   HEMATOCRIT 25.5* 23.5*  --  25.9*   .4* 110.8*  --  116.1*   MCH 35.5* 35.8*  --  35.9*   MCHC 32.2* 32.3  --  30.9*   RDW 76.5* 75.8*  --  82.1*   PLATELETCT 110* 117*  --  113*   MPV 9.9 9.9  --  9.6       Recent Labs     11/10/23  0657 11/11/23  0408 11/12/23  0458   SODIUM 136 136 140   POTASSIUM 4.0 4.9 4.5   CHLORIDE 101 104 107   CO2 25 24 23   GLUCOSE 120* 136* 108*   BUN 14 20 21   CREATININE 0.74 0.77 0.81   CALCIUM 9.2 9.4 9.2                     Imaging  US-THYROID   Final Result      1.  No thyroid nodules or thyromegaly.   2.  Somewhat challenging exam due to thyroid location.      IR-US GUIDED PIV   Final Result    Ultrasound-guided PERIPHERAL IV INSERTION performed by    qualified nursing staff as above.      MR-ORBITS,FACE,NECK-WITH&W/O & SEQUENCES   Final Result         Enlargement of the bilateral extraocular muscles with retrobulbar fat stranding in a pattern suggesting thyroid orbitopathy.      Left ORBIT: Changes suspicious for orbital cellulitis with left-sided proptosis, orbital edema, fat stranding, enlargement of the extraocular muscles, particularly the  left lateral rectus. Preseptal edema is also noted on the left side. There is a thin 8    mm x 2 mm inflammatory fluid collection in the left anterolateral preseptal soft tissues at the level of the globe equator.      Right ORBIT: Enlargement of the extraocular muscles with mild edema of the retrobulbar fat and changes concerning for thyroid orbitopathy.         [id number] WITH   Final Result         1.  Marked enlargement of the bilateral extraocular muscles as described with adjacent hazy fat stranding. Consider changes related to underlying thyroid pathology or orbital myositis. Consider other causes of extraocular muscle enlargement as clinically    appropriate.   2.  Small enhancing fluid collection anterior lateral to the left globe, appearance most typical of small abscess.   3.  Slight hazy bilateral intraorbital fat stranding, greater on the left, could be secondary to underlying extraocular muscle pathology or changes of orbital cellulitis.   4.  Bilateral proptosis, left slightly greater than right.      CT-HEAD WITH & W/O   Final Result         1.  No acute intracranial abnormality.   2.  Mild atrophy and nonspecific white matter changes most commonly associated with small vessel ischemia.   3.  Atherosclerosis           Assessment/Plan  * Orbital cellulitis- (present on admission)  Assessment & Plan  Patient admitted for treatment for orbital cellulitis and small abscess anterolateral to the left globe.  Otherwise no intracranial abscess noted.  IV vancomycin and ceftriaxone chosen  Ophthalmology consult expected tomorrow, possibly with small abscess drain.  We will keep n.p.o.  Pain control as needed with IV and p.o. 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  As per patient, he missed a few days of levothyroxine.  Will increase dose of levothyroxine from 125 to 150 mcg    Chronic respiratory failure with hypoxia (HCC)- (present on admission)  Assessment & Plan  Currently on room air  Resolved    Rheumatoid arthritis (HCC)- (present on admission)  Assessment & Plan  Currently not on disease modifying medications.  Tylenol, oxycodone as needed    Thrombocytopenia (HCC)- (present on admission)  Assessment & Plan  Secondary to aplastic anemia    Chronic anemia- (present on admission)  Assessment & Plan  Secondary to aplastic anemia    Pancytopenia (HCC)- (present on admission)  Assessment & Plan  Secondary to history of MDS/aplastic anemia diagnoses and oncology notes noted on care everywhere  [facility] and was going to establish with [facility]  Has outpatient appointment for [facility]  Receives weekly blood transfusions as well as \"shots\"  Continue monitoring daily CBCs and CMP  [facility]ending [facility] records    Prostate cancer (HCC)- (present on admission)  Assessment & Plan  History of  Continue home meds  [facility]ending [facility] records         VTE prophylaxis: Lovenox    I have performed a physical exam and reviewed and updated ROS and Plan today (11/12/2023). In review of yesterday's note (11/11/2023), there are no changes except as documented above.      "

## 2023-11-13 VITALS
OXYGEN SATURATION: 98 % | RESPIRATION RATE: 16 BRPM | HEART RATE: 72 BPM | TEMPERATURE: 97.8 F | HEIGHT: 67 IN | BODY MASS INDEX: 19.78 KG/M2 | SYSTOLIC BLOOD PRESSURE: 125 MMHG | DIASTOLIC BLOOD PRESSURE: 65 MMHG | WEIGHT: 126 LBS

## 2023-11-13 LAB
ALBUMIN SERPL BCP-MCNC: 2.9 G/DL (ref 3.2–4.9)
ALBUMIN/GLOB SERPL: 0.8 G/DL
ALP SERPL-CCNC: 61 U/L (ref 30–99)
ALT SERPL-CCNC: 40 U/L (ref 2–50)
ANION GAP SERPL CALC-SCNC: 8 MMOL/L (ref 7–16)
ANISOCYTOSIS BLD QL SMEAR: ABNORMAL
AST SERPL-CCNC: 42 U/L (ref 12–45)
BACTERIA BLD CULT: NORMAL
BACTERIA BLD CULT: NORMAL
BASOPHILS # BLD AUTO: 0 % (ref 0–1.8)
BASOPHILS # BLD: 0 K/UL (ref 0–0.12)
BILIRUB SERPL-MCNC: 0.2 MG/DL (ref 0.1–1.5)
BUN SERPL-MCNC: 23 MG/DL (ref 8–22)
CALCIUM ALBUM COR SERPL-MCNC: 9.5 MG/DL (ref 8.5–10.5)
CALCIUM SERPL-MCNC: 8.6 MG/DL (ref 8.5–10.5)
CHLORIDE SERPL-SCNC: 104 MMOL/L (ref 96–112)
CO2 SERPL-SCNC: 25 MMOL/L (ref 20–33)
COMMENT 1642: NORMAL
CREAT SERPL-MCNC: 0.57 MG/DL (ref 0.5–1.4)
EOSINOPHIL # BLD AUTO: 0.02 K/UL (ref 0–0.51)
EOSINOPHIL NFR BLD: 0.4 % (ref 0–6.9)
ERYTHROCYTE [DISTWIDTH] IN BLOOD BY AUTOMATED COUNT: 80.2 FL (ref 35.9–50)
GFR SERPLBLD CREATININE-BSD FMLA CKD-EPI: 101 ML/MIN/1.73 M 2
GLOBULIN SER CALC-MCNC: 3.5 G/DL (ref 1.9–3.5)
GLUCOSE SERPL-MCNC: 86 MG/DL (ref 65–99)
HCT VFR BLD AUTO: 26.7 % (ref 42–52)
HGB BLD-MCNC: 8.8 G/DL (ref 14–18)
IMM GRANULOCYTES # BLD AUTO: 0.06 K/UL (ref 0–0.11)
IMM GRANULOCYTES NFR BLD AUTO: 1.3 % (ref 0–0.9)
LYMPHOCYTES # BLD AUTO: 0.65 K/UL (ref 1–4.8)
LYMPHOCYTES NFR BLD: 14.3 % (ref 22–41)
MACROCYTES BLD QL SMEAR: ABNORMAL
MCH RBC QN AUTO: 37.1 PG (ref 27–33)
MCHC RBC AUTO-ENTMCNC: 33 G/DL (ref 32.3–36.5)
MCV RBC AUTO: 112.7 FL (ref 81.4–97.8)
MONOCYTES # BLD AUTO: 0.4 K/UL (ref 0–0.85)
MONOCYTES NFR BLD AUTO: 8.8 % (ref 0–13.4)
MORPHOLOGY BLD-IMP: NORMAL
NEUTROPHILS # BLD AUTO: 3.41 K/UL (ref 1.82–7.42)
NEUTROPHILS NFR BLD: 75.2 % (ref 44–72)
NRBC # BLD AUTO: 0 K/UL
NRBC BLD-RTO: 0 /100 WBC (ref 0–0.2)
OVALOCYTES BLD QL SMEAR: NORMAL
PLATELET # BLD AUTO: 111 K/UL (ref 164–446)
PLATELET BLD QL SMEAR: NORMAL
PLATELETS.RETICULATED NFR BLD AUTO: 1.8 % (ref 0.6–13.1)
PMV BLD AUTO: 9.5 FL (ref 9–12.9)
POIKILOCYTOSIS BLD QL SMEAR: NORMAL
POTASSIUM SERPL-SCNC: 4.2 MMOL/L (ref 3.6–5.5)
PROT SERPL-MCNC: 6.4 G/DL (ref 6–8.2)
RBC # BLD AUTO: 2.37 M/UL (ref 4.7–6.1)
RBC BLD AUTO: PRESENT
SIGNIFICANT IND 70042: NORMAL
SIGNIFICANT IND 70042: NORMAL
SITE SITE: NORMAL
SITE SITE: NORMAL
SODIUM SERPL-SCNC: 137 MMOL/L (ref 135–145)
SOURCE SOURCE: NORMAL
SOURCE SOURCE: NORMAL
VANCOMYCIN PEAK SERPL-MCNC: 16.7 UG/ML (ref 20–40)
WBC # BLD AUTO: 4.5 K/UL (ref 4.8–10.8)

## 2023-11-13 PROCEDURE — 700102 HCHG RX REV CODE 250 W/ 637 OVERRIDE(OP): Performed by: INTERNAL MEDICINE

## 2023-11-13 PROCEDURE — A9270 NON-COVERED ITEM OR SERVICE: HCPCS | Performed by: INTERNAL MEDICINE

## 2023-11-13 PROCEDURE — 700111 HCHG RX REV CODE 636 W/ 250 OVERRIDE (IP): Performed by: STUDENT IN AN ORGANIZED HEALTH CARE EDUCATION/TRAINING PROGRAM

## 2023-11-13 PROCEDURE — A9270 NON-COVERED ITEM OR SERVICE: HCPCS | Performed by: STUDENT IN AN ORGANIZED HEALTH CARE EDUCATION/TRAINING PROGRAM

## 2023-11-13 PROCEDURE — 700102 HCHG RX REV CODE 250 W/ 637 OVERRIDE(OP): Performed by: STUDENT IN AN ORGANIZED HEALTH CARE EDUCATION/TRAINING PROGRAM

## 2023-11-13 PROCEDURE — 80053 COMPREHEN METABOLIC PANEL: CPT

## 2023-11-13 PROCEDURE — 85025 COMPLETE CBC W/AUTO DIFF WBC: CPT

## 2023-11-13 PROCEDURE — 80202 ASSAY OF VANCOMYCIN: CPT

## 2023-11-13 PROCEDURE — 99239 HOSP IP/OBS DSCHRG MGMT >30: CPT | Performed by: STUDENT IN AN ORGANIZED HEALTH CARE EDUCATION/TRAINING PROGRAM

## 2023-11-13 PROCEDURE — 85055 RETICULATED PLATELET ASSAY: CPT

## 2023-11-13 RX ORDER — AMOXICILLIN AND CLAVULANATE POTASSIUM 875; 125 MG/1; MG/1
1 TABLET, FILM COATED ORAL EVERY 12 HOURS
Qty: 20 TABLET | Refills: 0 | Status: ACTIVE | OUTPATIENT
Start: 2023-11-13 | End: 2023-11-23

## 2023-11-13 RX ORDER — AMOXICILLIN AND CLAVULANATE POTASSIUM 875; 125 MG/1; MG/1
1 TABLET, FILM COATED ORAL EVERY 12 HOURS
Status: DISCONTINUED | OUTPATIENT
Start: 2023-11-13 | End: 2023-11-13 | Stop reason: HOSPADM

## 2023-11-13 RX ORDER — LEVOTHYROXINE SODIUM 0.15 MG/1
150 TABLET ORAL
Qty: 30 TABLET | Refills: 0 | Status: SHIPPED | OUTPATIENT
Start: 2023-11-14 | End: 2023-12-27

## 2023-11-13 RX ORDER — DOXYCYCLINE 100 MG/1
100 TABLET ORAL EVERY 12 HOURS
Status: DISCONTINUED | OUTPATIENT
Start: 2023-11-13 | End: 2023-11-13 | Stop reason: HOSPADM

## 2023-11-13 RX ORDER — DOXYCYCLINE 100 MG/1
100 TABLET ORAL EVERY 12 HOURS
Qty: 20 TABLET | Refills: 0 | Status: ACTIVE | OUTPATIENT
Start: 2023-11-13 | End: 2023-11-23

## 2023-11-13 RX ORDER — PREDNISONE 10 MG/1
TABLET ORAL
Qty: 63 TABLET | Refills: 0 | Status: SHIPPED | OUTPATIENT
Start: 2023-11-13 | End: 2023-12-01

## 2023-11-13 RX ADMIN — PREDNISONE 60 MG: 50 TABLET ORAL at 06:45

## 2023-11-13 RX ADMIN — TAMSULOSIN HYDROCHLORIDE 0.4 MG: 0.4 CAPSULE ORAL at 06:46

## 2023-11-13 RX ADMIN — Medication 1000 UNITS: at 06:46

## 2023-11-13 RX ADMIN — CYANOCOBALAMIN TAB 500 MCG 1000 MCG: 500 TAB at 06:46

## 2023-11-13 RX ADMIN — ATENOLOL 12.5 MG: 25 TABLET ORAL at 06:46

## 2023-11-13 RX ADMIN — LEVOTHYROXINE SODIUM 150 MCG: 0.15 TABLET ORAL at 06:45

## 2023-11-13 RX ADMIN — Medication 500 MG: at 06:46

## 2023-11-13 RX ADMIN — PYRIDOXINE HCL TAB 50 MG 50 MG: 50 TAB at 06:46

## 2023-11-13 RX ADMIN — CEFTRIAXONE SODIUM 2000 MG: 10 INJECTION, POWDER, FOR SOLUTION INTRAVENOUS at 06:46

## 2023-11-13 RX ADMIN — DOXYCYCLINE 100 MG: 100 TABLET, FILM COATED ORAL at 11:17

## 2023-11-13 RX ADMIN — POTASSIUM CHLORIDE 20 MEQ: 1500 TABLET, EXTENDED RELEASE ORAL at 06:45

## 2023-11-13 RX ADMIN — AMOXICILLIN AND CLAVULANATE POTASSIUM 1 TABLET: 875; 125 TABLET, FILM COATED ORAL at 11:17

## 2023-11-13 RX ADMIN — CETIRIZINE HYDROCHLORIDE 10 MG: 10 TABLET, FILM COATED ORAL at 06:46

## 2023-11-13 NOTE — PROGRESS NOTES
DC instructions reviewed w/ pt , verbalized understanding. PIV dc'd, armband removed. Meds to bed delivered.

## 2023-11-13 NOTE — DISCHARGE INSTRUCTIONS
Pt will request medical records prior to visit, choose of paper versus computer. Plan to call Medical Records and make plan to  medical records on Wed., can  in person, plan to call to ensure records are ready.     Pt will f/u with Diallo Ariza MD ophthalmology 11/13 at 07:30 am at [address] [address].     Pt will call and make f[facility] appt [facility].     [facility], has been doing prior.     Orbital Cellulitis  Orbital cellulitis is an infection in the eye socket (orbit) and the tissues that surround the eye. The infection can spread to the eyelids, eyebrow area, and cheek. It can also cause a pocket of pus to develop around the eye (orbital abscess).  In severe cases, the infection can spread to the brain. Orbital cellulitis is a medical emergency.  What are the causes?  The most common cause of this condition is a bacterial infection. The infection usually spreads to the eye socket from another part of the body. The infection may start in the:  Nose or sinuses.  Eyelids.  Facial skin.  Bloodstream.  What increases the risk?  You are more likely to develop this condition if you recently had one of the following:  Upper respiratory infection. This affects the nose, throat, and upper air passages.  Sinus infection.  Eyelid or facial infection.  Tooth infection.  Eye injury or an object behind the eyeball that should not be there (foreign body).  Infection that affects the entire body or the bloodstream (systemic infection).  What are the signs or symptoms?  Symptoms of this condition include:  Eye pain that gets worse with eye movement.  Swelling around the eye.  Eye redness.  Bulging of the eye.  Inability to move the eye.  Double vision or decreased vision.  Fever.  Symptoms of this condition usually start quickly.  How is this diagnosed?  This condition may be diagnosed based on your symptoms and an eye exam. You may also have tests to confirm the  diagnosis and to check for an orbital abscess. Other tests (cultures) may be done to find out which specific bacteria are causing the infection.  Tests may include:  Complete blood count (CBC).  Blood culture.  Nose, sinus, or throat culture.  Imaging studies, such as a CT scan. Less commonly, you may also have an MRI.  How is this treated?  This condition is usually treated with antibiotic medicines in a hospital. Antibiotics are given directly into a vein through an IV.  At first, you may get IV antibiotics to kill bacteria that often cause orbital cellulitis (broad spectrum antibiotics).  Your medicine may be changed if the culture test results suggest that another medicine would be better.  If the IV antibiotics are working to treat your infection, you may be switched to oral antibiotics and allowed to go home.  In some cases, surgery may be needed to release pressure from behind the eye or to drain an orbital abscess.  Follow these instructions at home:  Take over-the-counter and prescription medicines only as told by your health care provider.  Take your antibiotic medicine as told by your health care provider. Do not stop taking the antibiotic even if you start to feel better.  Return to your normal activities as told by your health care provider. Ask your health care provider what activities are safe for you.  Keep all follow-up visits. This is important.  Contact a health care provider if:  You have questions about your medicines.  Your pain is not well-controlled.  Get help right away if:  Your eye pain or swelling returns or it gets worse.  You have any changes in your vision.  You develop a fever.  You have vomiting.  You develop a severe headache or numbness in your face.  These symptoms may represent a serious problem that is an emergency. Do not wait to see if the symptoms will go away. Get medical help right away. Call your local emergency services (911 in the [place] Do not drive yourself to the  [facility].  Summary  Orbital cellulitis is an infection in the eye socket (orbit) and the tissues that surround the eye. The infection can spread to other areas.  Symptoms usually start quickly. Some of the symptoms include eye pain that gets worse with movement, redness and swelling around the eye, double vision, or decreased vision.  Orbital cellulitis is a medical emergency, and it requires IV antibiotics for treatment.  Get help right away if your symptoms return or get worse.  This information is not intended to replace advice given to you by your health care provider. Make sure you discuss any questions you have with your health care provider.  Document Revised: 04/21/2021 Document Reviewed: 04/21/2021  Elsevier Patient Education © 2023 [employer]

## 2023-11-13 NOTE — DISCHARGE SUMMARY
Discharge Summary    CHIEF COMPLAINT ON ADMISSION  Chief Complaint   Patient presents with    Eye Pain     Pain, redness, and swelling to both eyes x 2 days L >R. Patient was seen by an optometrist today who told him to come to the ER to be treated for orbital cellulitis.        Reason for Admission  EMS     Admission Date  11/8/2023    CODE STATUS  Full Code    HPI & HOSPITAL COURSE  78 y.o. male with a past medical history of COPD, chronic hypoxia on 2 L, CAD, MI, hypertension, dyslipidemia, rheumatoid arthritis, hypothyroidism, and MDS/aplastic aplastic anemia who [facility] and requires weekly transfusions presented to emergency department on 11/8/2023 with a 2-day history of bilateral eye pain and left eye swelling and was subsequently admitted for bilateral orbital cellulitis.     CTA head and orbit: Bilateral extraocular muscles stranding and enlargement.  Small fluid collection anterior and lateral to the left globe, likely a small abscess.  Hazy bilateral intraorbital fat stranding, greater on the left.  Bilateral proptosis, worse on the left side. ERP Dr Reyes spoke to Dr. Mendoza for which patient was ultimately admitted to the medical floor and started on broad-spectrum IV antibiotics as well as IV steroids. 11/10 Orbit/Neck/Neck MRI showed enlargement of the bilateral extraocular muscles with retrobulbar fat stranding in a pattern suggesting thyroid orbitopathy, left orbital cellulitis with left-sided proptosis, orbital edema, fat stranding and enlargement of extraocular muscles.  All of which are consistent with thyroid orbitopathy.  His TSH was in the 50s and both T4 and free T3 were low all of which consistent with hypothyroidism.  TPO was elevated and thyrotropin receptor antibodies were low.  His levothyroxine was increased.  Patient had marked improvement of bilateral swelling with steroids and broad-spectrum antibiotics.  He completed a 3-day Solu-Medrol IV regimen and was  transition to a prednisone tapered.  Patient was transition to a doxycycline/Augmentin antibiotic regimen which she is to complete in the outpatient setting. 11/8 blood cultures with no growth today.  Patient was scheduled to follow-up with Dr. Mendoza in the outpatient setting on 11/15/2023 with aims to set up Teprotumumab infusions.  Vitals remained stable as well as labs.  Stable patient with in chronic condition was discharged home and instructed to follow-up with his primary care provider and ophthalmology in the outpatient setting.  All results and plan of action were discussed with the patient for she voiced understanding and agreement with the primary care team.  Patient was instructed to return to the emergency department if symptoms were to worsen.     Therefore, he is discharged in good and stable condition to home with close outpatient follow-up.    The patient met 2-midnight criteria for an inpatient stay at the time of discharge.    Discharge Date  11/13/2023    FOLLOW UP ITEMS POST DISCHARGE  Primary care provider follow posthospital discharge care  Ophthalmology to evaluate in the outpatient setting and determine need for Teprotumumab infusions.    DISCHARGE DIAGNOSES  Principal Problem:    Orbital cellulitis (POA: Yes)  Active Problems:    Rheumatoid arthritis (HCC) (POA: Yes)    Chronic respiratory failure with hypoxia (HCC) (POA: Yes)      Overview: Uses 2lpm at rest, 3lpm with ambulation and at night    Hypothyroidism (POA: Yes)    Thyroid orbitopathy (POA: Yes)    Aplastic anemia (HCC) (POA: Yes)    Prostate cancer (HCC) (POA: Yes)      Overview: IMO load March 2020    Pancytopenia (HCC) (Chronic) (POA: Yes)    Chronic anemia (POA: Yes)    Thrombocytopenia (HCC) (POA: Yes)  Resolved Problems:    * No resolved hospital problems. *      FOLLOW UP  No future appointments.  No follow-up provider specified.    MEDICATIONS ON DISCHARGE     Medication List        START taking these medications         Instructions   amoxicillin-clavulanate 875-125 MG Tabs  Commonly known as: Augmentin   Take 1 Tablet by mouth every 12 hours for 10 days.  Dose: 1 Tablet     doxycycline monohydrate 100 MG tablet  Commonly known as: Adoxa   Take 1 Tablet by mouth every 12 hours for 10 days.  Dose: 100 mg     predniSONE 10 MG Tabs  Start taking on: November 13, 2023  Commonly known as: Deltasone   Take 6 Tablets by mouth every day for 3 days, THEN 5 Tablets every day for 3 days, THEN 4 Tablets every day for 3 days, THEN 3 Tablets every day for 3 days, THEN 2 Tablets every day for 3 days, THEN 1 Tablet every day for 3 days.            CHANGE how you take these medications        Instructions   levothyroxine 150 MCG Tabs  Start taking on: November 14, 2023  What changed:   medication strength  how much to take  Commonly known as: Synthroid   Take 1 Tablet by mouth every morning on an empty stomach.  Dose: 150 mcg            CONTINUE taking these medications        Instructions   ascorbic acid 500 MG tablet  Commonly known as: Vitamin C   Take 500 mg by mouth every morning.  Dose: 500 mg     aspirin EC 81 MG Tbec  Commonly known as: Ecotrin   Take 81 mg by mouth at bedtime.  Dose: 81 mg     atenolol 25 MG Tabs  Commonly known as: Tenormin   Take 25 mg by mouth every morning.  Dose: 25 mg     atorvastatin 80 MG tablet  Commonly known as: Lipitor   Take 80 mg by mouth every evening.  Dose: 80 mg     cetirizine 10 MG Tabs  Commonly known as: ZyrTEC   Take 10 mg by mouth every morning.  Dose: 10 mg     docusate calcium 240 MG Caps  Commonly known as: Surfak   Take 240 mg by mouth at bedtime.  Dose: 240 mg     ferrous sulfate 325 (65 Fe) MG tablet   Take 325 mg by mouth at bedtime.  Dose: 325 mg     isosorbide mononitrate SR 60 MG Tb24  Commonly known as: Imdur   Take 60 mg by mouth every morning.  Dose: 60 mg     oxybutynin 5 MG Tabs  Commonly known as: Ditropan   Take 5 mg by mouth at bedtime.  Dose: 5 mg     potassium chloride SA 20 MEQ  Tbcr  Commonly known as: Kdur   Take 20 mEq by mouth 2 times a day.  Dose: 20 mEq     pyridoxine 50 MG Tabs  Commonly known as: Vitamin B-6   Take 50 mg by mouth every morning.  Dose: 50 mg     tamsulosin 0.4 MG capsule  Commonly known as: Flomax   Take 0.4 mg by mouth every morning.  Dose: 0.4 mg     vitamin B-12 1000 MCG Tabs   Take 1,000 mcg by mouth every morning.  Dose: 1,000 mcg     vitamin D 1000 Unit (25 mcg) Tabs  Commonly known as: cholecalciferol   Take 1,000 Units by mouth every day.  Dose: 1,000 Units              Allergies  No Known Allergies    DIET  Orders Placed This Encounter   Procedures    Diet Order Diet: Regular     Standing Status:   Standing     Number of Occurrences:   1     Order Specific Question:   Diet:     Answer:   Regular [1]       ACTIVITY  As tolerated.  Weight bearing as tolerated    CONSULTATIONS  Ophthalmology    PROCEDURES  None    LABORATORY  Lab Results   Component Value Date    SODIUM 137 11/13/2023    POTASSIUM 4.2 11/13/2023    CHLORIDE 104 11/13/2023    CO2 25 11/13/2023    GLUCOSE 86 11/13/2023    BUN 23 (H) 11/13/2023    CREATININE 0.57 11/13/2023        Lab Results   Component Value Date    WBC 4.5 (L) 11/13/2023    HEMOGLOBIN 8.8 (L) 11/13/2023    HEMATOCRIT 26.7 (L) 11/13/2023    PLATELETCT 111 (L) 11/13/2023        Total time of the discharge process exceeds 32 minutes.

## 2023-11-13 NOTE — CARE PLAN
The patient is Stable - Low risk of patient condition declining or worsening    Shift Goals  Clinical Goals: pt will remain free from falls during shift  Patient Goals: to go home  Family Goals: TARA    Progress made toward(s) clinical / shift goals:  Pt used call light appropriately, for assistance top restroom when needed. Pt having visual issues, risk for fall.      Problem: Knowledge Deficit - Standard  Goal: Patient and family/care givers will demonstrate understanding of plan of care, disease process/condition, diagnostic tests and medications  Outcome: Progressing     Problem: Pain - Standard  Goal: Alleviation of pain or a reduction in pain to the patient’s comfort goal  Outcome: Progressing        Patient is not progressing towards the following goals: Pt has strong desire to go home but is aware that he won't be going home tonight/today. However that was still a goal of patients to work towards discharge.

## 2023-11-13 NOTE — DISCHARGE PLANNING
Care Transition Team Final Discharge Disposition    Patient will be discharged home day with follow up to opthalmology at [facility].

## 2023-11-15 ENCOUNTER — OFFICE VISIT (OUTPATIENT)
Dept: OPHTHALMOLOGY | Facility: MEDICAL CENTER | Age: 78
End: 2023-11-15
Payer: COMMERCIAL

## 2023-11-15 DIAGNOSIS — H35.30 ARMD (AGE RELATED MACULAR DEGENERATION): ICD-10-CM

## 2023-11-15 DIAGNOSIS — E07.9 THYROID ORBITOPATHY: ICD-10-CM

## 2023-11-15 DIAGNOSIS — H05.89 THYROID ORBITOPATHY: ICD-10-CM

## 2023-11-15 DIAGNOSIS — H05.013 CELLULITIS OF BOTH ORBITS: ICD-10-CM

## 2023-11-15 DIAGNOSIS — E03.9 HYPOTHYROIDISM, UNSPECIFIED TYPE: ICD-10-CM

## 2023-11-15 DIAGNOSIS — H40.003 GLAUCOMA SUSPECT OF BOTH EYES: ICD-10-CM

## 2023-11-15 DIAGNOSIS — H49.9 OPHTHALMOPLEGIA: ICD-10-CM

## 2023-11-15 DIAGNOSIS — H35.352 CYSTOID MACULAR EDEMA OF LEFT EYE: ICD-10-CM

## 2023-11-15 PROCEDURE — 92250 FUNDUS PHOTOGRAPHY W/I&R: CPT | Performed by: OPHTHALMOLOGY

## 2023-11-15 PROCEDURE — 92060 SENSORIMOTOR EXAMINATION: CPT | Performed by: OPHTHALMOLOGY

## 2023-11-15 PROCEDURE — 99214 OFFICE O/P EST MOD 30 MIN: CPT | Mod: 25 | Performed by: OPHTHALMOLOGY

## 2023-11-15 PROCEDURE — 92083 EXTENDED VISUAL FIELD XM: CPT | Performed by: OPHTHALMOLOGY

## 2023-11-15 ASSESSMENT — CONF VISUAL FIELD
OS_INFERIOR_TEMPORAL_RESTRICTION: 0
OD_SUPERIOR_NASAL_RESTRICTION: 0
OS_NORMAL: 1
OD_INFERIOR_NASAL_RESTRICTION: 0
OD_INFERIOR_TEMPORAL_RESTRICTION: 0
OD_SUPERIOR_TEMPORAL_RESTRICTION: 0
OD_NORMAL: 1
OS_SUPERIOR_NASAL_RESTRICTION: 0
OS_SUPERIOR_TEMPORAL_RESTRICTION: 0
OS_INFERIOR_NASAL_RESTRICTION: 0

## 2023-11-15 ASSESSMENT — REFRACTION_MANIFEST
OS_SPHERE: -0.25
OD_AXIS: 018
METHOD_AUTOREFRACTION: 1
OS_AXIS: 114
OS_CYLINDER: +0.75
OD_CYLINDER: +0.25
OD_SPHERE: +0.50

## 2023-11-15 ASSESSMENT — CUP TO DISC RATIO
OD_RATIO: 0.2
OS_RATIO: 0.2

## 2023-11-15 ASSESSMENT — SLIT LAMP EXAM - LIDS
COMMENTS: NORMAL
COMMENTS: NORMAL

## 2023-11-15 ASSESSMENT — TONOMETRY
OD_IOP_MMHG: 12
OS_IOP_MMHG: 11

## 2023-11-15 ASSESSMENT — VISUAL ACUITY
OD_CC: J1
OD_SC: 20/25
OS_CC: J1
OS_SC: 20/25-1
METHOD: SNELLEN - LINEAR

## 2023-11-15 ASSESSMENT — EXTERNAL EXAM - RIGHT EYE: OD_EXAM: PROPTOSIS

## 2023-11-15 ASSESSMENT — EXTERNAL EXAM - LEFT EYE: OS_EXAM: PROPTOSIS

## 2023-11-15 ASSESSMENT — ENCOUNTER SYMPTOMS: DOUBLE VISION: 1

## 2023-11-15 NOTE — PROGRESS NOTES
Peds/Neuro Ophthalmology:   Diallo Ariza M.D.    Date & Time note created:    11/15/2023   8:28 AM     Referring MD / APRN:  Pcp Pt States None, No att. providers found    Patient ID:  Name:             King Alegria   YOB: 1945  Age:                 78 y.o.  male   MRN:               [id number]    Chief Complaint/Reason for Visit:     Diplopia      History of Present Illness:    King Alegria is a 78 y.o. male   [facility] and discharged yesterday.Patient says thyroid issue and eyes were swollen and blown up,left eye worse than right.Pain and swelling greatly reduced after 2 days of IV steroids.+ double vision.No headaches.        Review of Systems:  Review of Systems   Eyes:  Positive for double vision.   All other systems reviewed and are negative.      Past Medical History:   Past Medical History:   Diagnosis Date    Arthritis     Chronic respiratory failure with hypoxia ([facility]) 09/15/2023    Uses 2lpm at rest, 3lpm with ambulation and at night    COPD (chronic obstructive pulmonary disease) ([facility])     Former smoker 09/15/2023    Heart attack ([facility]) 1998    Heart attack ([facility]) 1999    Had stents placed last month    Hyperlipidemia     Hypertension     Lung nodule 09/15/2023    Pancytopenia ([facility]) 09/15/2023    Prostate cancer ([facility]) 2 years ago    Had seeds    Rheumatoid arthritis ([facility]) 12/15/2015       Past Surgical History:  History reviewed. No pertinent surgical history.    Current Outpatient Medications:  Current Outpatient Medications   Medication Sig Dispense Refill    amoxicillin-clavulanate (AUGMENTIN) 875-125 MG Tab Take 1 Tablet by mouth every 12 hours for 10 days. 20 Tablet 0    doxycycline monohydrate (ADOXA) 100 MG tablet Take 1 Tablet by mouth every 12 hours for 10 days. 20 Tablet 0    predniSONE (DELTASONE) 10 MG Tab Take 6 Tablets by mouth every day for 3 days, THEN 5 Tablets every day for 3 days, THEN 4  Tablets every day for 3 days, THEN 3 Tablets every day for 3 days, THEN 2 Tablets every day for 3 days, THEN 1 Tablet every day for 3 days. 63 Tablet 0    levothyroxine (SYNTHROID) 150 MCG Tab Take 1 Tablet by mouth every morning on an empty stomach. 30 Tablet 0    potassium chloride SA (KDUR) 20 MEQ Tab CR Take 20 mEq by mouth 2 times a day.      Cyanocobalamin (VITAMIN B-12) 1000 MCG Tab Take 1,000 mcg by mouth every morning.      pyridoxine (VITAMIN B-6) 50 MG Tab Take 50 mg by mouth every morning.      cetirizine (ZYRTEC) 10 MG Tab Take 10 mg by mouth every morning.      ascorbic acid (VITAMIN C) 500 MG tablet Take 500 mg by mouth every morning.      isosorbide mononitrate SR (IMDUR) 60 MG TABLET SR 24 HR Take 60 mg by mouth every morning.      docusate calcium (SURFAK) 240 MG Cap Take 240 mg by mouth at bedtime.      ferrous sulfate 325 (65 Fe) MG tablet Take 325 mg by mouth at bedtime.      oxybutynin (DITROPAN) 5 MG Tab Take 5 mg by mouth at bedtime.      aspirin EC (ECOTRIN) 81 MG Tablet Delayed Response Take 81 mg by mouth at bedtime.      atenolol (TENORMIN) 25 MG Tab Take 25 mg by mouth every morning.      atorvastatin (LIPITOR) 80 MG tablet Take 80 mg by mouth every evening.      vitamin D (CHOLECALCIFEROL) 1000 UNIT Tab Take 1,000 Units by mouth every day.      tamsulosin (FLOMAX) 0.4 MG capsule Take 0.4 mg by mouth every morning.       No current facility-administered medications for this visit.       Allergies:  No Known Allergies    Family History:  Family History   Problem Relation Age of Onset    Cancer Mother        Social History:  Social History     Socioeconomic History    Marital status:      Spouse name: Not on file    Number of children: Not on file    Years of education: Not on file    Highest education level: Not on file   Occupational History    Not on file   Tobacco Use    Smoking status: Former     Current packs/day: 0.00     Average packs/day: 2.0 packs/day for 50.0 years (100.0  ttl pk-yrs)     Types: Cigarettes     Start date: 1963     Quit date: 2013     Years since quittin.9     Passive exposure: Never    Smokeless tobacco: Never   Vaping Use    Vaping Use: Never used   Substance and Sexual Activity    Alcohol use: No     Comment: ETOH abue in the past    Drug use: No    Sexual activity: Not on file   Other Topics Concern    Not on file   Social History Narrative    Retired     Social Determinants of Health     Financial Resource Strain: Not on file   Food Insecurity: Not on file   Transportation Needs: Not on file   Physical Activity: Not on file   Stress: Not on file   Social Connections: Not on file   Intimate Partner Violence: Not on file   Housing Stability: Not on file          Physical Exam:  Physical Exam    Oriented x 3  Weight/BMI: There is no height or weight on file to calculate BMI.  There were no vitals taken for this visit.    Base Eye Exam       Visual Acuity (Snellen - Linear)         Right Left    Dist sc 20/25 20/25-1    Near cc J1 J1              Tonometry ( care, 7:41 AM)         Right Left    Pressure 12 11              Pupils         React    Right Sluggish    Left Sluggish              Visual Fields         Right Left     Full Full                  Additional Tests       Color         Right Left    Ishihara 4/9 3/9              Stereo       Fly: -    Animals: 0/3    Circles: 0/9                  Strabismus Exam         -3 -3 -3   -3 -3 -3                       0  0   -2  -2                       0 0 0   0 0 0                       Slit Lamp and Fundus Exam       External Exam         Right Left    External Proptosis Proptosis              Slit Lamp Exam         Right Left    Lids/Lashes Normal Normal    Conjunctiva/Sclera Injection Injection    Cornea Clear Clear    Anterior Chamber Deep and quiet Deep and quiet    Iris Round and reactive Round and reactive    Lens Clear Clear    Vitreous Normal Normal              Fundus Exam         Right Left     Disc Normal Normal    C/D Ratio 0.2 0.2    Macula Drusen Drusen    Vessels Normal Normal    Periphery Normal Normal                  Refraction       Manifest Refraction (Auto)         Sphere Cylinder Axis    Right +0.50 +0.25 018    Left -0.25 +0.75 114                    Pertinent Lab/Test/Imaging Review:      Assessment and Plan:     Thyroid orbitopathy  11/9/2023-given the current findings of bilateral severe proptosis with chemosis lid swelling conjunctival redness and some ocular discomfort, most likely diagnosis is acute thyroid orbitopathy.  Francois score of 7.0 plus 2.  This includes spontaneous orbital pain, gaze evoked orbital pain, eyelid swelling, eyelid erythema, conjunctival redness, chemosis, inflammation of caruncle or plica.  As well as significant increased proptosis and decrease in immune movements.  Given the level of proptosis would recommend starting 1 g Solu-Medrol intravenous daily x3 then oral steroid taper.  Recommend obtaining thyroid antibodies as well as TSH receptor antibody.  May need to get oculoplastic surgery involved.  Current emergent standard treatment now includes teprotumumab.  This is an intravenous infusion that would need to be approved and given through a infusion center.  We will contact the patient access liaison at [facility]  11/15/2023 - Overall significant improvement however sill motility disturbance and proptosis. On steroid taper. Prednisone 60 mg, DAILY Starting Mon 11/13/2023, For 3 days, THEN 50 mg, DAILY Starting Thu 11/16/2023, For 3 days, THEN 40 mg, DAILY Starting Sun 11/19/2023, For 3 days, THEN 30 mg, DAILY Starting Wed 11/22/2023, For 3 days, THEN 20 mg, DAILY Starting Sat 11/25/2023, For 3 days, THEN 10 mg, DAILY Starting Tue 11/28/2023, For 3 days hold at 10 mg per day. [facility] function and as well to initiate teprotumumab. Follow up in two weeks.    Orbital cellulitis  11/9/2023-overall the admission diagnosis  is orbital cellulitis possibly infectious in nature, and reviewing the MRI scan there was significant enlargement of the extraocular muscles including some fat stranding.  There was no apparent ethmoid sphenoid or maxillary sinusitis.  Therefore high in the differential diagnosis would be thyroid orbitopathy, also would be some sort of an orbital inflammatory syndrome or orbital lymphoma..  11/15/2023 - Full course of antibiotics for 10 days    Glaucoma suspect of both eyes  11/15/2023 - IOP stable. OCT NFL thickness 88 OD and 87 OS    ARMD (age related macular degeneration)  Bilateral perifoveal drusen    Cystoid macular edema of left eye  11/15/2023 - CME left eye seen on OCT    Hypothyroid  11/15/2023 - Very hypothyroid. TSH 57.200, T4 0.63, T3 1.02 Microsomal TPO elevated. Need [facility].     Ophthalmoplegia  11/15/2023 - continued proptosis and bilateral elevation deficit, abduction and adduction deficit of the left eye secondary to VICKIE        Diallo Ariza M.D.

## 2023-11-15 NOTE — ASSESSMENT & PLAN NOTE
11/15/2023 - continued proptosis and bilateral elevation deficit, abduction and adduction deficit of the left eye secondary to VICKIE

## 2023-11-15 NOTE — ASSESSMENT & PLAN NOTE
11/15/2023 - Very hypothyroid. TSH 57.200, T4 0.63, T3 1.02 Microsomal TPO elevated. Need [facility] better [facility].

## 2023-11-15 NOTE — ASSESSMENT & PLAN NOTE
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  Francois score of 7.0 plus 2.  This includes spontaneous orbital pain, gaze evoked orbital pain, eyelid swelling, eyelid erythema, conjunctival redness, chemosis, inflammation of caruncle or plica.  As well as significant increased proptosis and decrease in immune movements.  Given the level of proptosis would recommend starting 1 g Solu-Medrol intravenous daily x3 then oral steroid taper.  Recommend obtaining thyroid antibodies as well as TSH receptor antibody.  May need to get oculoplastic surgery involved.  Current emergent standard treatment now includes teprotumumab.  This is an intravenous infusion that would need to be approved and given through a infusion center.  We will contact the patient access liaison at [facility]  11/15/2023 - Overall significant improvement however sill motility disturbance and proptosis. On steroid taper. Prednisone 60 mg, DAILY Starting Mon 11/13/2023, For 3 days, THEN 50 mg, DAILY Starting Thu 11/16/2023, For 3 days, THEN 40 mg, DAILY Starting Sun 11/19/2023, For 3 days, THEN 30 mg, DAILY Starting Wed 11/22/2023, For 3 days, THEN 20 mg, DAILY Starting Sat 11/25/2023, For 3 days, THEN 10 mg, DAILY Starting Tue 11/28/2023, For 3 days hold at 10 mg per day. Important that [facility] thyroid function and as well to initiate teprotumumab. Follow up in two weeks.

## 2023-11-15 NOTE — ASSESSMENT & PLAN NOTE
11/9/2023-overall the admission diagnosis is orbital cellulitis possibly infectious in nature, and reviewing the MRI scan there was significant enlargement of the extraocular muscles including some fat stranding.  There was no apparent ethmoid sphenoid or maxillary sinusitis.  Therefore high in the differential diagnosis would be thyroid orbitopathy, also would be some sort of an orbital inflammatory syndrome or orbital lymphoma..  11/15/2023 - Full course of antibiotics for 10 days

## 2023-11-16 ENCOUNTER — TELEPHONE (OUTPATIENT)
Dept: OPHTHALMOLOGY | Facility: MEDICAL CENTER | Age: 78
End: 2023-11-16
Payer: COMMERCIAL

## 2023-12-05 ENCOUNTER — OFFICE VISIT (OUTPATIENT)
Dept: OPHTHALMOLOGY | Facility: MEDICAL CENTER | Age: 78
End: 2023-12-05
Payer: COMMERCIAL

## 2023-12-05 DIAGNOSIS — E07.9 THYROID ORBITOPATHY: ICD-10-CM

## 2023-12-05 DIAGNOSIS — H05.013 CELLULITIS OF BOTH ORBITS: ICD-10-CM

## 2023-12-05 DIAGNOSIS — H49.9 OPHTHALMOPLEGIA: ICD-10-CM

## 2023-12-05 DIAGNOSIS — H05.89 THYROID ORBITOPATHY: ICD-10-CM

## 2023-12-05 PROCEDURE — 99214 OFFICE O/P EST MOD 30 MIN: CPT | Performed by: OPHTHALMOLOGY

## 2023-12-05 PROCEDURE — 92060 SENSORIMOTOR EXAMINATION: CPT | Performed by: OPHTHALMOLOGY

## 2023-12-05 ASSESSMENT — SLIT LAMP EXAM - LIDS
COMMENTS: NORMAL
COMMENTS: NORMAL

## 2023-12-05 ASSESSMENT — REFRACTION_WEARINGRX
OS_ADD: +2.75
SPECS_TYPE: BIFOCAL
OS_AXIS: 072
OD_ADD: +2.75
OD_CYLINDER: +0.25
OD_SPHERE: -0.75
OS_CYLINDER: +0.75
OD_AXIS: 136
OS_SPHERE: -1.00

## 2023-12-05 ASSESSMENT — CONF VISUAL FIELD
OS_INFERIOR_NASAL_RESTRICTION: 0
OD_INFERIOR_TEMPORAL_RESTRICTION: 0
OS_NORMAL: 1
OD_INFERIOR_NASAL_RESTRICTION: 0
OS_SUPERIOR_TEMPORAL_RESTRICTION: 0
OD_SUPERIOR_NASAL_RESTRICTION: 0
OS_SUPERIOR_NASAL_RESTRICTION: 0
OD_NORMAL: 1
OD_SUPERIOR_TEMPORAL_RESTRICTION: 0
OS_INFERIOR_TEMPORAL_RESTRICTION: 0

## 2023-12-05 ASSESSMENT — REFRACTION_MANIFEST
OS_SPHERE: +0.25
OS_AXIS: 115
OD_CYLINDER: SPHERE
METHOD_AUTOREFRACTION: 1
OS_CYLINDER: +0.25
OD_SPHERE: +0.50

## 2023-12-05 ASSESSMENT — VISUAL ACUITY
OS_CC: 20/25-2
OD_CC: 20/30
METHOD: SNELLEN - LINEAR
CORRECTION_TYPE: GLASSES

## 2023-12-05 ASSESSMENT — EXTERNAL EXAM - LEFT EYE: OS_EXAM: PROPTOSIS

## 2023-12-05 ASSESSMENT — TONOMETRY
OS_IOP_MMHG: 7
OD_IOP_MMHG: 6

## 2023-12-05 ASSESSMENT — EXTERNAL EXAM - RIGHT EYE: OD_EXAM: PROPTOSIS

## 2023-12-05 ASSESSMENT — CUP TO DISC RATIO
OS_RATIO: 0.2
OD_RATIO: 0.2

## 2023-12-05 ASSESSMENT — ENCOUNTER SYMPTOMS: BLURRED VISION: 1

## 2023-12-05 NOTE — ASSESSMENT & PLAN NOTE
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  Francois score of 7.0 plus 2.  This includes spontaneous orbital pain, gaze evoked orbital pain, eyelid swelling, eyelid erythema, conjunctival redness, chemosis, inflammation of caruncle or plica.  As well as significant increased proptosis and decrease in immune movements.  Given the level of proptosis would recommend starting 1 g Solu-Medrol intravenous daily x3 then oral steroid taper.  Recommend obtaining thyroid antibodies as well as TSH receptor antibody.  May need to get oculoplastic surgery involved.  Current emergent standard treatment now includes teprotumumab.  This is an intravenous infusion that would need to be approved and given through a infusion center.  We will contact the patient access liaison at [facility]  11/15/2023 - Overall significant improvement however sill motility disturbance and proptosis. On steroid taper. Prednisone 60 mg, DAILY Starting Mon 11/13/2023, For 3 days, THEN 50 mg, DAILY Starting Thu 11/16/2023, For 3 days, THEN 40 mg, DAILY Starting Sun 11/19/2023, For 3 days, THEN 30 mg, DAILY Starting Wed 11/22/2023, For 3 days, THEN 20 mg, DAILY Starting Sat 11/25/2023, For 3 days, THEN 10 mg, DAILY Starting Tue 11/28/2023, For 3 days hold at 10 mg per day. Important that [facility] thyroid function and as well to initiate teprotumumab. Follow up in two weeks.  12/5/2023 - Much less chemosis, but still elevation, depression deficit and abduction deficit of the left eye. Also lid retraction. Will decrease prednisone to 5.0 mg. Awaiting approval for Tepezza

## 2023-12-05 NOTE — ASSESSMENT & PLAN NOTE
11/15/2023 - continued proptosis and bilateral elevation deficit, abduction and adduction deficit of the left eye secondary to VICKIE  12/5/2023 - Improved on the prednisone. Decrease to 5.0 mg. Awaiting approval for the tepezza

## 2023-12-05 NOTE — PROGRESS NOTES
Peds/Neuro Ophthalmology:   Diallo Ariza M.D.    Date & Time note created:    12/5/2023   1:34 PM     Referring MD / APRN:  Pcp Pt States None, No att. providers found    Patient ID:  Name:             King Alegria   YOB: 1945  Age:                 78 y.o.  male   MRN:               [id number]    Chief Complaint/Reason for Visit:     Other (Thyroid orbitopathy)      History of Present Illness:    King Alegria is a 78 y.o. male   2 week follow up for thyroid orbitopathy.No double vision or headaches.Vision a little better.Patient received anni that tepezza is approved.        Review of Systems:  Review of Systems   Eyes:  Positive for blurred vision.   All other systems reviewed and are negative.      Past Medical History:   Past Medical History:   Diagnosis Date    Arthritis     Chronic respiratory failure with hypoxia ([facility]) 09/15/2023    Uses 2lpm at rest, 3lpm with ambulation and at night    COPD (chronic obstructive pulmonary disease) ([facility])     Former smoker 09/15/2023    Heart attack ([facility]) 1998    Heart attack ([facility]) 1999    Had stents placed last month    Hyperlipidemia     Hypertension     Lung nodule 09/15/2023    Pancytopenia ([facility]) 09/15/2023    Prostate cancer ([facility]) 2 years ago    Had seeds    Rheumatoid arthritis ([facility]) 12/15/2015       Past Surgical History:  History reviewed. No pertinent surgical history.    Current Outpatient Medications:  Current Outpatient Medications   Medication Sig Dispense Refill    levothyroxine (SYNTHROID) 150 MCG Tab Take 1 Tablet by mouth every morning on an empty stomach. 30 Tablet 0    potassium chloride SA (KDUR) 20 MEQ Tab CR Take 20 mEq by mouth 2 times a day.      Cyanocobalamin (VITAMIN B-12) 1000 MCG Tab Take 1,000 mcg by mouth every morning.      pyridoxine (VITAMIN B-6) 50 MG Tab Take 50 mg by mouth every morning.      cetirizine (ZYRTEC) 10 MG Tab Take 10 mg by mouth every morning.      ascorbic acid (VITAMIN C) 500 MG tablet Take 500  mg by mouth every morning.      isosorbide mononitrate SR (IMDUR) 60 MG TABLET SR 24 HR Take 60 mg by mouth every morning.      docusate calcium (SURFAK) 240 MG Cap Take 240 mg by mouth at bedtime.      ferrous sulfate 325 (65 Fe) MG tablet Take 325 mg by mouth at bedtime.      oxybutynin (DITROPAN) 5 MG Tab Take 5 mg by mouth at bedtime.      aspirin EC (ECOTRIN) 81 MG Tablet Delayed Response Take 81 mg by mouth at bedtime.      atenolol (TENORMIN) 25 MG Tab Take 25 mg by mouth every morning.      atorvastatin (LIPITOR) 80 MG tablet Take 80 mg by mouth every evening.      vitamin D (CHOLECALCIFEROL) 1000 UNIT Tab Take 1,000 Units by mouth every day.      tamsulosin (FLOMAX) 0.4 MG capsule Take 0.4 mg by mouth every morning.       No current facility-administered medications for this visit.       Allergies:  No Known Allergies    Family History:  Family History   Problem Relation Age of Onset    Cancer Mother        Social History:  Social History     Socioeconomic History    Marital status:      Spouse name: Not on file    Number of children: Not on file    Years of education: Not on file    Highest education level: Not on file   Occupational History    Not on file   Tobacco Use    Smoking status: Former     Current packs/day: 0.00     Average packs/day: 2.0 packs/day for 50.0 years (100.0 ttl pk-yrs)     Types: Cigarettes     Start date: 11/30/1963     Quit date: 11/30/2013     Years since quitting: 10.0     Passive exposure: Never    Smokeless tobacco: Never   Vaping Use    Vaping Use: Never used   Substance and Sexual Activity    Alcohol use: No     Comment: ETOH abue in the past    Drug use: No    Sexual activity: Not on file   Other Topics Concern    Not on file   Social History Narrative    Retired     Social Determinants of Health     Financial Resource Strain: Not on file   Food Insecurity: Not on file   Transportation Needs: Not on file   Physical Activity: Not on file   Stress: Not on file   Social  Connections: Not on file   Intimate Partner Violence: Not on file   Housing Stability: Not on file          Physical Exam:  Physical Exam    Oriented x 3  Weight/BMI: There is no height or weight on file to calculate BMI.  There were no vitals taken for this visit.    Base Eye Exam       Visual Acuity (Snellen - Linear)         Right Left    Dist cc 20/30 20/25-2      Correction: Glasses              Tonometry (i care, 1:17 PM)         Right Left    Pressure 6 7              Pupils         Pupils    Right PERRL    Left PERRL              Visual Fields         Right Left     Full Full              Neuro/Psych       Oriented x3: Yes    Mood/Affect: Normal                  Additional Tests       Stereo       Fly: -    Animals: 0/3    Circles: 0/9                  Strabismus Exam         -2 -2 -2   -2 -2 -2                       0  0   0  -2                       0 0 0   0 0 0                       Slit Lamp and Fundus Exam       External Exam         Right Left    External Proptosis Proptosis              Slit Lamp Exam         Right Left    Lids/Lashes Normal Normal    Conjunctiva/Sclera Injection Injection    Cornea Clear Clear    Anterior Chamber Deep and quiet Deep and quiet    Iris Round and reactive Round and reactive    Lens Clear Clear    Vitreous Normal Normal              Fundus Exam         Right Left    Disc Normal Normal    C/D Ratio 0.2 0.2    Macula Drusen Drusen    Vessels Normal Normal    Periphery Normal Normal                  Refraction       Wearing Rx         Sphere Cylinder Axis Add    Right -0.75 +0.25 136 +2.75    Left -1.00 +0.75 072 +2.75      Type: Bifocal              Manifest Refraction (Auto)         Sphere Cylinder Axis    Right +0.50 Sphere     Left +0.25 +0.25 115                    Pertinent Lab/Test/Imaging Review:      Assessment and Plan:     Thyroid orbitopathy  11/9/2023-given the current findings of bilateral severe proptosis with chemosis lid swelling conjunctival redness and  some ocular discomfort, most likely diagnosis is acute thyroid orbitopathy.  Francois score of 7.0 plus 2.  This includes spontaneous orbital pain, gaze evoked orbital pain, eyelid swelling, eyelid erythema, conjunctival redness, chemosis, inflammation of caruncle or plica.  As well as significant increased proptosis and decrease in immune movements.  Given the level of proptosis would recommend starting 1 g Solu-Medrol intravenous daily x3 then oral steroid taper.  Recommend obtaining thyroid antibodies as well as TSH receptor antibody.  May need to get oculoplastic surgery involved.  Current emergent standard treatment now includes teprotumumab.  This is an intravenous infusion that would need to be approved and given through a infusion center.  We will contact the patient access liaison at [facility]  11/15/2023 - Overall significant improvement however sill motility disturbance and proptosis. On steroid taper. Prednisone 60 mg, DAILY Starting Mon 11/13/2023, For 3 days, THEN 50 mg, DAILY Starting Thu 11/16/2023, For 3 days, THEN 40 mg, DAILY Starting Sun 11/19/2023, For 3 days, THEN 30 mg, DAILY Starting Wed 11/22/2023, For 3 days, THEN 20 mg, DAILY Starting Sat 11/25/2023, For 3 days, THEN 10 mg, DAILY Starting Tue 11/28/2023, For 3 days hold at 10 mg per day. Important that [facility] thyroid function and as well to initiate teprotumumab. Follow up in two weeks.  12/5/2023 - Much less chemosis, but still elevation, depression deficit and abduction deficit of the left eye. Also lid retraction. Will decrease prednisone to 5.0 mg. Awaiting approval for Tepezza    Ophthalmoplegia  11/15/2023 - continued proptosis and bilateral elevation deficit, abduction and adduction deficit of the left eye secondary to VICKIE  12/5/2023 - Improved on the prednisone. Decrease to 5.0 mg. Awaiting approval for the tepezza    Orbital cellulitis  11/9/2023-overall the admission diagnosis is orbital cellulitis possibly  infectious in nature, and reviewing the MRI scan there was significant enlargement of the extraocular muscles including some fat stranding.  There was no apparent ethmoid sphenoid or maxillary sinusitis.  Therefore high in the differential diagnosis would be thyroid orbitopathy, also would be some sort of an orbital inflammatory syndrome or orbital lymphoma..  11/15/2023 - Full course of antibiotics for 10 days        Diallo Ariza M.D.

## 2023-12-07 ENCOUNTER — HOSPITAL ENCOUNTER (INPATIENT)
Facility: MEDICAL CENTER | Age: 78
LOS: 2 days | DRG: 189 | End: 2023-12-09
Attending: STUDENT IN AN ORGANIZED HEALTH CARE EDUCATION/TRAINING PROGRAM | Admitting: INTERNAL MEDICINE
Payer: COMMERCIAL

## 2023-12-07 ENCOUNTER — APPOINTMENT (OUTPATIENT)
Dept: CARDIOLOGY | Facility: MEDICAL CENTER | Age: 78
DRG: 189 | End: 2023-12-07
Attending: INTERNAL MEDICINE
Payer: COMMERCIAL

## 2023-12-07 ENCOUNTER — APPOINTMENT (OUTPATIENT)
Dept: RADIOLOGY | Facility: MEDICAL CENTER | Age: 78
DRG: 189 | End: 2023-12-07
Attending: STUDENT IN AN ORGANIZED HEALTH CARE EDUCATION/TRAINING PROGRAM
Payer: COMMERCIAL

## 2023-12-07 ENCOUNTER — APPOINTMENT (OUTPATIENT)
Dept: RADIOLOGY | Facility: MEDICAL CENTER | Age: 78
DRG: 189 | End: 2023-12-07
Attending: INTERNAL MEDICINE
Payer: COMMERCIAL

## 2023-12-07 DIAGNOSIS — E03.9 HYPOTHYROIDISM, UNSPECIFIED TYPE: ICD-10-CM

## 2023-12-07 DIAGNOSIS — E87.6 HYPOKALEMIA: ICD-10-CM

## 2023-12-07 DIAGNOSIS — A41.9 SEVERE SEPSIS (HCC): ICD-10-CM

## 2023-12-07 DIAGNOSIS — D61.9 APLASTIC ANEMIA (HCC): ICD-10-CM

## 2023-12-07 DIAGNOSIS — D61.818 PANCYTOPENIA (HCC): ICD-10-CM

## 2023-12-07 DIAGNOSIS — E87.79 OTHER HYPERVOLEMIA: ICD-10-CM

## 2023-12-07 DIAGNOSIS — J96.01 ACUTE HYPOXIC RESPIRATORY FAILURE (HCC): ICD-10-CM

## 2023-12-07 DIAGNOSIS — R07.9 ACUTE CHEST PAIN: ICD-10-CM

## 2023-12-07 DIAGNOSIS — R65.20 SEVERE SEPSIS (HCC): ICD-10-CM

## 2023-12-07 DIAGNOSIS — J44.1 ACUTE EXACERBATION OF CHRONIC OBSTRUCTIVE PULMONARY DISEASE (COPD) (HCC): ICD-10-CM

## 2023-12-07 PROBLEM — R60.0 LOWER EXTREMITY EDEMA: Status: ACTIVE | Noted: 2023-12-07

## 2023-12-07 PROBLEM — R79.89 ELEVATED TROPONIN: Status: ACTIVE | Noted: 2023-12-07

## 2023-12-07 PROBLEM — J96.21 ACUTE ON CHRONIC RESPIRATORY FAILURE WITH HYPOXIA (HCC): Status: ACTIVE | Noted: 2023-12-07

## 2023-12-07 PROBLEM — R65.10 SIRS (SYSTEMIC INFLAMMATORY RESPONSE SYNDROME) (HCC): Status: ACTIVE | Noted: 2023-12-07

## 2023-12-07 PROBLEM — R57.9 SHOCK (HCC): Status: ACTIVE | Noted: 2023-12-07

## 2023-12-07 LAB
ABO + RH BLD: NORMAL
ABO GROUP BLD: NORMAL
ALBUMIN SERPL BCP-MCNC: 2.9 G/DL (ref 3.2–4.9)
ALBUMIN/GLOB SERPL: 0.9 G/DL
ALP SERPL-CCNC: 126 U/L (ref 30–99)
ALT SERPL-CCNC: 55 U/L (ref 2–50)
ANION GAP SERPL CALC-SCNC: 11 MMOL/L (ref 7–16)
APPEARANCE UR: CLEAR
APTT PPP: 34.1 SEC (ref 24.7–36)
AST SERPL-CCNC: 39 U/L (ref 12–45)
B PARAP IS1001 DNA NPH QL NAA+NON-PROBE: NOT DETECTED
B PERT.PT PRMT NPH QL NAA+NON-PROBE: NOT DETECTED
BACTERIA #/AREA URNS HPF: NEGATIVE /HPF
BARCODED ABORH UBTYP: 6200
BARCODED PRD CODE UBPRD: NORMAL
BARCODED UNIT NUM UBUNT: NORMAL
BASOPHILS # BLD AUTO: 0 % (ref 0–1.8)
BASOPHILS # BLD: 0 K/UL (ref 0–0.12)
BILIRUB SERPL-MCNC: 0.8 MG/DL (ref 0.1–1.5)
BILIRUB UR QL STRIP.AUTO: NEGATIVE
BLD GP AB SCN SERPL QL: NORMAL
BUN SERPL-MCNC: 8 MG/DL (ref 8–22)
C PNEUM DNA NPH QL NAA+NON-PROBE: NOT DETECTED
CALCIUM ALBUM COR SERPL-MCNC: 9.7 MG/DL (ref 8.5–10.5)
CALCIUM SERPL-MCNC: 8.8 MG/DL (ref 8.4–10.2)
CHLORIDE SERPL-SCNC: 101 MMOL/L (ref 96–112)
CO2 SERPL-SCNC: 24 MMOL/L (ref 20–33)
COLOR UR: YELLOW
COMPONENT R 8504R: NORMAL
CREAT SERPL-MCNC: 0.67 MG/DL (ref 0.5–1.4)
EKG IMPRESSION: NORMAL
EOSINOPHIL # BLD AUTO: 0.01 K/UL (ref 0–0.51)
EOSINOPHIL NFR BLD: 0.3 % (ref 0–6.9)
EPI CELLS #/AREA URNS HPF: ABNORMAL /HPF
ERYTHROCYTE [DISTWIDTH] IN BLOOD BY AUTOMATED COUNT: 73.7 FL (ref 35.9–50)
FLUAV RNA NPH QL NAA+NON-PROBE: NOT DETECTED
FLUAV RNA SPEC QL NAA+PROBE: NEGATIVE
FLUBV RNA NPH QL NAA+NON-PROBE: NOT DETECTED
FLUBV RNA SPEC QL NAA+PROBE: NEGATIVE
GFR SERPLBLD CREATININE-BSD FMLA CKD-EPI: 96 ML/MIN/1.73 M 2
GLOBULIN SER CALC-MCNC: 3.3 G/DL (ref 1.9–3.5)
GLUCOSE BLD STRIP.AUTO-MCNC: 137 MG/DL (ref 65–99)
GLUCOSE SERPL-MCNC: 135 MG/DL (ref 65–99)
GLUCOSE UR STRIP.AUTO-MCNC: NEGATIVE MG/DL
HADV DNA NPH QL NAA+NON-PROBE: NOT DETECTED
HCOV 229E RNA NPH QL NAA+NON-PROBE: NOT DETECTED
HCOV HKU1 RNA NPH QL NAA+NON-PROBE: NOT DETECTED
HCOV NL63 RNA NPH QL NAA+NON-PROBE: NOT DETECTED
HCOV OC43 RNA NPH QL NAA+NON-PROBE: NOT DETECTED
HCT VFR BLD AUTO: 23.1 % (ref 42–52)
HGB BLD-MCNC: 7.5 G/DL (ref 14–18)
HMPV RNA NPH QL NAA+NON-PROBE: NOT DETECTED
HPIV1 RNA NPH QL NAA+NON-PROBE: NOT DETECTED
HPIV2 RNA NPH QL NAA+NON-PROBE: NOT DETECTED
HPIV3 RNA NPH QL NAA+NON-PROBE: NOT DETECTED
HPIV4 RNA NPH QL NAA+NON-PROBE: NOT DETECTED
IMM GRANULOCYTES # BLD AUTO: 0.07 K/UL (ref 0–0.11)
IMM GRANULOCYTES NFR BLD AUTO: 1.8 % (ref 0–0.9)
INR PPP: 1.17 (ref 0.87–1.13)
KETONES UR STRIP.AUTO-MCNC: NEGATIVE MG/DL
LACTATE SERPL-SCNC: 1 MMOL/L (ref 0.5–2)
LACTATE SERPL-SCNC: 2 MMOL/L (ref 0.5–2)
LEUKOCYTE ESTERASE UR QL STRIP.AUTO: NEGATIVE
LV EJECT FRACT  99904: 60
LV EJECT FRACT MOD 2C 99903: 53.42
LV EJECT FRACT MOD 4C 99902: 65.73
LV EJECT FRACT MOD BP 99901: 60.16
LYMPHOCYTES # BLD AUTO: 0.23 K/UL (ref 1–4.8)
LYMPHOCYTES NFR BLD: 5.8 % (ref 22–41)
M PNEUMO DNA NPH QL NAA+NON-PROBE: NOT DETECTED
MCH RBC QN AUTO: 37.3 PG (ref 27–33)
MCHC RBC AUTO-ENTMCNC: 32.5 G/DL (ref 32.3–36.5)
MCV RBC AUTO: 114.9 FL (ref 81.4–97.8)
MICRO URNS: ABNORMAL
MONOCYTES # BLD AUTO: 0.56 K/UL (ref 0–0.85)
MONOCYTES NFR BLD AUTO: 14.1 % (ref 0–13.4)
NEUTROPHILS # BLD AUTO: 3.1 K/UL (ref 1.82–7.42)
NEUTROPHILS NFR BLD: 78 % (ref 44–72)
NITRITE UR QL STRIP.AUTO: NEGATIVE
NRBC # BLD AUTO: 0 K/UL
NRBC BLD-RTO: 0 /100 WBC (ref 0–0.2)
NT-PROBNP SERPL IA-MCNC: 3447 PG/ML (ref 0–125)
PH UR STRIP.AUTO: 6 [PH] (ref 5–8)
PLATELET # BLD AUTO: 154 K/UL (ref 164–446)
PMV BLD AUTO: 10 FL (ref 9–12.9)
POTASSIUM SERPL-SCNC: 3.1 MMOL/L (ref 3.6–5.5)
PROCALCITONIN SERPL-MCNC: 0.12 NG/ML
PRODUCT TYPE UPROD: NORMAL
PROT SERPL-MCNC: 6.2 G/DL (ref 6–8.2)
PROT UR QL STRIP: NEGATIVE MG/DL
PROTHROMBIN TIME: 15.3 SEC (ref 12–14.6)
RBC # BLD AUTO: 2.01 M/UL (ref 4.7–6.1)
RBC # URNS HPF: ABNORMAL /HPF
RBC UR QL AUTO: ABNORMAL
RH BLD: NORMAL
RSV RNA NPH QL NAA+NON-PROBE: NOT DETECTED
RSV RNA SPEC QL NAA+PROBE: NEGATIVE
RV+EV RNA NPH QL NAA+NON-PROBE: NOT DETECTED
SARS-COV-2 RNA NPH QL NAA+NON-PROBE: NOTDETECTED
SARS-COV-2 RNA RESP QL NAA+PROBE: NOTDETECTED
SODIUM SERPL-SCNC: 136 MMOL/L (ref 135–145)
SP GR UR STRIP.AUTO: <=1.005
SPECIMEN SOURCE: NORMAL
T4 FREE SERPL-MCNC: 2.76 NG/DL (ref 0.93–1.7)
TROPONIN T SERPL-MCNC: 53 NG/L (ref 6–19)
TROPONIN T SERPL-MCNC: 67 NG/L (ref 6–19)
TROPONIN T SERPL-MCNC: 71 NG/L (ref 6–19)
TROPONIN T SERPL-MCNC: 86 NG/L (ref 6–19)
TSH SERPL DL<=0.005 MIU/L-ACNC: 0.04 UIU/ML (ref 0.38–5.33)
UFH PPP CHRO-ACNC: <0.1 IU/ML
UFH PPP CHRO-ACNC: <0.1 IU/ML
UNIT STATUS USTAT: NORMAL
VIT B12 SERPL-MCNC: 1139 PG/ML (ref 211–911)
WBC # BLD AUTO: 4 K/UL (ref 4.8–10.8)
WBC #/AREA URNS HPF: ABNORMAL /HPF

## 2023-12-07 PROCEDURE — 700102 HCHG RX REV CODE 250 W/ 637 OVERRIDE(OP): Performed by: STUDENT IN AN ORGANIZED HEALTH CARE EDUCATION/TRAINING PROGRAM

## 2023-12-07 PROCEDURE — 87086 URINE CULTURE/COLONY COUNT: CPT

## 2023-12-07 PROCEDURE — A9270 NON-COVERED ITEM OR SERVICE: HCPCS | Performed by: INTERNAL MEDICINE

## 2023-12-07 PROCEDURE — 85520 HEPARIN ASSAY: CPT | Mod: 91

## 2023-12-07 PROCEDURE — 99291 CRITICAL CARE FIRST HOUR: CPT

## 2023-12-07 PROCEDURE — 93306 TTE W/DOPPLER COMPLETE: CPT | Mod: 26 | Performed by: INTERNAL MEDICINE

## 2023-12-07 PROCEDURE — 99292 CRITICAL CARE ADDL 30 MIN: CPT | Performed by: INTERNAL MEDICINE

## 2023-12-07 PROCEDURE — 700101 HCHG RX REV CODE 250: Performed by: INTERNAL MEDICINE

## 2023-12-07 PROCEDURE — 94669 MECHANICAL CHEST WALL OSCILL: CPT

## 2023-12-07 PROCEDURE — 82962 GLUCOSE BLOOD TEST: CPT

## 2023-12-07 PROCEDURE — 700111 HCHG RX REV CODE 636 W/ 250 OVERRIDE (IP): Performed by: INTERNAL MEDICINE

## 2023-12-07 PROCEDURE — 99291 CRITICAL CARE FIRST HOUR: CPT | Performed by: INTERNAL MEDICINE

## 2023-12-07 PROCEDURE — 700105 HCHG RX REV CODE 258: Performed by: STUDENT IN AN ORGANIZED HEALTH CARE EDUCATION/TRAINING PROGRAM

## 2023-12-07 PROCEDURE — 80053 COMPREHEN METABOLIC PANEL: CPT

## 2023-12-07 PROCEDURE — 83605 ASSAY OF LACTIC ACID: CPT | Mod: 91

## 2023-12-07 PROCEDURE — 700101 HCHG RX REV CODE 250: Performed by: STUDENT IN AN ORGANIZED HEALTH CARE EDUCATION/TRAINING PROGRAM

## 2023-12-07 PROCEDURE — 93005 ELECTROCARDIOGRAM TRACING: CPT | Performed by: INTERNAL MEDICINE

## 2023-12-07 PROCEDURE — 700102 HCHG RX REV CODE 250 W/ 637 OVERRIDE(OP): Performed by: INTERNAL MEDICINE

## 2023-12-07 PROCEDURE — 94640 AIRWAY INHALATION TREATMENT: CPT

## 2023-12-07 PROCEDURE — 700117 HCHG RX CONTRAST REV CODE 255: Performed by: STUDENT IN AN ORGANIZED HEALTH CARE EDUCATION/TRAINING PROGRAM

## 2023-12-07 PROCEDURE — 85730 THROMBOPLASTIN TIME PARTIAL: CPT

## 2023-12-07 PROCEDURE — 71275 CT ANGIOGRAPHY CHEST: CPT

## 2023-12-07 PROCEDURE — 84439 ASSAY OF FREE THYROXINE: CPT

## 2023-12-07 PROCEDURE — 700105 HCHG RX REV CODE 258: Performed by: INTERNAL MEDICINE

## 2023-12-07 PROCEDURE — 87633 RESP VIRUS 12-25 TARGETS: CPT

## 2023-12-07 PROCEDURE — 700111 HCHG RX REV CODE 636 W/ 250 OVERRIDE (IP)

## 2023-12-07 PROCEDURE — A9270 NON-COVERED ITEM OR SERVICE: HCPCS | Performed by: STUDENT IN AN ORGANIZED HEALTH CARE EDUCATION/TRAINING PROGRAM

## 2023-12-07 PROCEDURE — 81001 URINALYSIS AUTO W/SCOPE: CPT

## 2023-12-07 PROCEDURE — 71045 X-RAY EXAM CHEST 1 VIEW: CPT

## 2023-12-07 PROCEDURE — 96366 THER/PROPH/DIAG IV INF ADDON: CPT

## 2023-12-07 PROCEDURE — 85025 COMPLETE CBC W/AUTO DIFF WBC: CPT

## 2023-12-07 PROCEDURE — 36415 COLL VENOUS BLD VENIPUNCTURE: CPT

## 2023-12-07 PROCEDURE — 87486 CHLMYD PNEUM DNA AMP PROBE: CPT

## 2023-12-07 PROCEDURE — 94760 N-INVAS EAR/PLS OXIMETRY 1: CPT

## 2023-12-07 PROCEDURE — 84145 PROCALCITONIN (PCT): CPT

## 2023-12-07 PROCEDURE — 96365 THER/PROPH/DIAG IV INF INIT: CPT

## 2023-12-07 PROCEDURE — 93005 ELECTROCARDIOGRAM TRACING: CPT | Performed by: STUDENT IN AN ORGANIZED HEALTH CARE EDUCATION/TRAINING PROGRAM

## 2023-12-07 PROCEDURE — 96375 TX/PRO/DX INJ NEW DRUG ADDON: CPT

## 2023-12-07 PROCEDURE — 84443 ASSAY THYROID STIM HORMONE: CPT

## 2023-12-07 PROCEDURE — 85610 PROTHROMBIN TIME: CPT

## 2023-12-07 PROCEDURE — 83880 ASSAY OF NATRIURETIC PEPTIDE: CPT

## 2023-12-07 PROCEDURE — 87798 DETECT AGENT NOS DNA AMP: CPT

## 2023-12-07 PROCEDURE — 96368 THER/DIAG CONCURRENT INF: CPT

## 2023-12-07 PROCEDURE — 700111 HCHG RX REV CODE 636 W/ 250 OVERRIDE (IP): Mod: JZ | Performed by: STUDENT IN AN ORGANIZED HEALTH CARE EDUCATION/TRAINING PROGRAM

## 2023-12-07 PROCEDURE — 700111 HCHG RX REV CODE 636 W/ 250 OVERRIDE (IP): Mod: JZ | Performed by: INTERNAL MEDICINE

## 2023-12-07 PROCEDURE — 86901 BLOOD TYPING SEROLOGIC RH(D): CPT

## 2023-12-07 PROCEDURE — 86900 BLOOD TYPING SEROLOGIC ABO: CPT

## 2023-12-07 PROCEDURE — 84484 ASSAY OF TROPONIN QUANT: CPT | Mod: 91

## 2023-12-07 PROCEDURE — 87581 M.PNEUMON DNA AMP PROBE: CPT

## 2023-12-07 PROCEDURE — 82607 VITAMIN B-12: CPT

## 2023-12-07 PROCEDURE — 0241U HCHG SARS-COV-2 COVID-19 NFCT DS RESP RNA 4 TRGT MIC: CPT

## 2023-12-07 PROCEDURE — 770020 HCHG ROOM/CARE - TELE (206)

## 2023-12-07 PROCEDURE — C9803 HOPD COVID-19 SPEC COLLECT: HCPCS | Performed by: STUDENT IN AN ORGANIZED HEALTH CARE EDUCATION/TRAINING PROGRAM

## 2023-12-07 PROCEDURE — 87040 BLOOD CULTURE FOR BACTERIA: CPT | Mod: 91

## 2023-12-07 PROCEDURE — 76705 ECHO EXAM OF ABDOMEN: CPT

## 2023-12-07 PROCEDURE — 86850 RBC ANTIBODY SCREEN: CPT

## 2023-12-07 PROCEDURE — 93306 TTE W/DOPPLER COMPLETE: CPT

## 2023-12-07 PROCEDURE — 700117 HCHG RX CONTRAST REV CODE 255: Performed by: INTERNAL MEDICINE

## 2023-12-07 RX ORDER — POLYETHYLENE GLYCOL 3350 17 G/17G
1 POWDER, FOR SOLUTION ORAL
Status: DISCONTINUED | OUTPATIENT
Start: 2023-12-07 | End: 2023-12-09 | Stop reason: HOSPADM

## 2023-12-07 RX ORDER — METHYLPREDNISOLONE SODIUM SUCCINATE 40 MG/ML
40 INJECTION, POWDER, LYOPHILIZED, FOR SOLUTION INTRAMUSCULAR; INTRAVENOUS DAILY
Status: DISCONTINUED | OUTPATIENT
Start: 2023-12-08 | End: 2023-12-07

## 2023-12-07 RX ORDER — ATORVASTATIN CALCIUM 40 MG/1
80 TABLET, FILM COATED ORAL NIGHTLY
Status: DISCONTINUED | OUTPATIENT
Start: 2023-12-07 | End: 2023-12-09 | Stop reason: HOSPADM

## 2023-12-07 RX ORDER — POTASSIUM CHLORIDE 20 MEQ/1
40 TABLET, EXTENDED RELEASE ORAL ONCE
Status: COMPLETED | OUTPATIENT
Start: 2023-12-07 | End: 2023-12-07

## 2023-12-07 RX ORDER — ONDANSETRON 2 MG/ML
4 INJECTION INTRAMUSCULAR; INTRAVENOUS EVERY 4 HOURS PRN
Status: DISCONTINUED | OUTPATIENT
Start: 2023-12-07 | End: 2023-12-09 | Stop reason: HOSPADM

## 2023-12-07 RX ORDER — IPRATROPIUM BROMIDE AND ALBUTEROL SULFATE 2.5; .5 MG/3ML; MG/3ML
3 SOLUTION RESPIRATORY (INHALATION)
Status: DISCONTINUED | OUTPATIENT
Start: 2023-12-07 | End: 2023-12-07

## 2023-12-07 RX ORDER — ACETAMINOPHEN 325 MG/1
650 TABLET ORAL ONCE
Status: COMPLETED | OUTPATIENT
Start: 2023-12-07 | End: 2023-12-07

## 2023-12-07 RX ORDER — ASPIRIN 325 MG
325 TABLET ORAL DAILY
Status: DISCONTINUED | OUTPATIENT
Start: 2023-12-07 | End: 2023-12-07

## 2023-12-07 RX ORDER — AZITHROMYCIN 250 MG/1
500 TABLET, FILM COATED ORAL DAILY
Status: COMPLETED | OUTPATIENT
Start: 2023-12-08 | End: 2023-12-09

## 2023-12-07 RX ORDER — HEPARIN SODIUM 1000 [USP'U]/ML
30 INJECTION, SOLUTION INTRAVENOUS; SUBCUTANEOUS PRN
Status: DISCONTINUED | OUTPATIENT
Start: 2023-12-07 | End: 2023-12-08

## 2023-12-07 RX ORDER — ACETAMINOPHEN 325 MG/1
650 TABLET ORAL EVERY 6 HOURS PRN
Status: DISCONTINUED | OUTPATIENT
Start: 2023-12-07 | End: 2023-12-09 | Stop reason: HOSPADM

## 2023-12-07 RX ORDER — OXYBUTYNIN CHLORIDE 5 MG/1
5 TABLET ORAL
Status: DISCONTINUED | OUTPATIENT
Start: 2023-12-07 | End: 2023-12-09 | Stop reason: HOSPADM

## 2023-12-07 RX ORDER — ASPIRIN 325 MG
325 TABLET ORAL ONCE
Status: COMPLETED | OUTPATIENT
Start: 2023-12-07 | End: 2023-12-07

## 2023-12-07 RX ORDER — HEPARIN SODIUM 5000 [USP'U]/100ML
0-30 INJECTION, SOLUTION INTRAVENOUS CONTINUOUS
Status: DISCONTINUED | OUTPATIENT
Start: 2023-12-07 | End: 2023-12-08

## 2023-12-07 RX ORDER — ASPIRIN 81 MG/1
81 TABLET, CHEWABLE ORAL DAILY
Status: DISCONTINUED | OUTPATIENT
Start: 2023-12-08 | End: 2023-12-09 | Stop reason: HOSPADM

## 2023-12-07 RX ORDER — TAMSULOSIN HYDROCHLORIDE 0.4 MG/1
0.4 CAPSULE ORAL EVERY MORNING
Status: DISCONTINUED | OUTPATIENT
Start: 2023-12-07 | End: 2023-12-09 | Stop reason: HOSPADM

## 2023-12-07 RX ORDER — BISACODYL 10 MG
10 SUPPOSITORY, RECTAL RECTAL
Status: DISCONTINUED | OUTPATIENT
Start: 2023-12-07 | End: 2023-12-09 | Stop reason: HOSPADM

## 2023-12-07 RX ORDER — SODIUM CHLORIDE, SODIUM LACTATE, POTASSIUM CHLORIDE, CALCIUM CHLORIDE 600; 310; 30; 20 MG/100ML; MG/100ML; MG/100ML; MG/100ML
INJECTION, SOLUTION INTRAVENOUS CONTINUOUS
Status: ACTIVE | OUTPATIENT
Start: 2023-12-07 | End: 2023-12-08

## 2023-12-07 RX ORDER — FERROUS SULFATE 325(65) MG
325 TABLET ORAL
Status: DISCONTINUED | OUTPATIENT
Start: 2023-12-07 | End: 2023-12-08

## 2023-12-07 RX ORDER — IPRATROPIUM BROMIDE AND ALBUTEROL SULFATE 2.5; .5 MG/3ML; MG/3ML
3 SOLUTION RESPIRATORY (INHALATION)
Status: DISCONTINUED | OUTPATIENT
Start: 2023-12-07 | End: 2023-12-09 | Stop reason: HOSPADM

## 2023-12-07 RX ORDER — LEVOTHYROXINE SODIUM 0.07 MG/1
150 TABLET ORAL
Status: DISCONTINUED | OUTPATIENT
Start: 2023-12-07 | End: 2023-12-09 | Stop reason: HOSPADM

## 2023-12-07 RX ORDER — ONDANSETRON 4 MG/1
4 TABLET, ORALLY DISINTEGRATING ORAL EVERY 4 HOURS PRN
Status: DISCONTINUED | OUTPATIENT
Start: 2023-12-07 | End: 2023-12-09 | Stop reason: HOSPADM

## 2023-12-07 RX ORDER — SODIUM CHLORIDE, SODIUM LACTATE, POTASSIUM CHLORIDE, CALCIUM CHLORIDE 600; 310; 30; 20 MG/100ML; MG/100ML; MG/100ML; MG/100ML
1000 INJECTION, SOLUTION INTRAVENOUS ONCE
Status: COMPLETED | OUTPATIENT
Start: 2023-12-07 | End: 2023-12-07

## 2023-12-07 RX ORDER — ATORVASTATIN CALCIUM 40 MG/1
80 TABLET, FILM COATED ORAL NIGHTLY
Status: DISCONTINUED | OUTPATIENT
Start: 2023-12-07 | End: 2023-12-07

## 2023-12-07 RX ORDER — FUROSEMIDE 10 MG/ML
20 INJECTION INTRAMUSCULAR; INTRAVENOUS ONCE
Status: COMPLETED | OUTPATIENT
Start: 2023-12-07 | End: 2023-12-07

## 2023-12-07 RX ORDER — HEPARIN SODIUM 5000 [USP'U]/ML
5000 INJECTION, SOLUTION INTRAVENOUS; SUBCUTANEOUS EVERY 8 HOURS
Status: DISCONTINUED | OUTPATIENT
Start: 2023-12-07 | End: 2023-12-07

## 2023-12-07 RX ORDER — POTASSIUM CHLORIDE 20 MEQ/1
40 TABLET, EXTENDED RELEASE ORAL 2 TIMES DAILY
Status: COMPLETED | OUTPATIENT
Start: 2023-12-07 | End: 2023-12-07

## 2023-12-07 RX ORDER — MAGNESIUM SULFATE 1 G/100ML
1 INJECTION INTRAVENOUS ONCE
Status: COMPLETED | OUTPATIENT
Start: 2023-12-07 | End: 2023-12-07

## 2023-12-07 RX ORDER — NOREPINEPHRINE BITARTRATE 0.03 MG/ML
0-1 INJECTION, SOLUTION INTRAVENOUS CONTINUOUS
Status: DISCONTINUED | OUTPATIENT
Start: 2023-12-07 | End: 2023-12-07

## 2023-12-07 RX ORDER — METHYLPREDNISOLONE SODIUM SUCCINATE 125 MG/2ML
125 INJECTION, POWDER, LYOPHILIZED, FOR SOLUTION INTRAMUSCULAR; INTRAVENOUS ONCE
Status: COMPLETED | OUTPATIENT
Start: 2023-12-07 | End: 2023-12-07

## 2023-12-07 RX ORDER — HEPARIN SODIUM 1000 [USP'U]/ML
60 INJECTION, SOLUTION INTRAVENOUS; SUBCUTANEOUS ONCE
Status: COMPLETED | OUTPATIENT
Start: 2023-12-07 | End: 2023-12-07

## 2023-12-07 RX ORDER — AZITHROMYCIN 250 MG/1
500 TABLET, FILM COATED ORAL ONCE
Status: COMPLETED | OUTPATIENT
Start: 2023-12-07 | End: 2023-12-07

## 2023-12-07 RX ORDER — IPRATROPIUM BROMIDE AND ALBUTEROL SULFATE 2.5; .5 MG/3ML; MG/3ML
3 SOLUTION RESPIRATORY (INHALATION) ONCE
Status: COMPLETED | OUTPATIENT
Start: 2023-12-07 | End: 2023-12-07

## 2023-12-07 RX ORDER — DEXTROSE MONOHYDRATE 25 G/50ML
25 INJECTION, SOLUTION INTRAVENOUS
Status: DISCONTINUED | OUTPATIENT
Start: 2023-12-07 | End: 2023-12-08

## 2023-12-07 RX ORDER — AMOXICILLIN 250 MG
2 CAPSULE ORAL 2 TIMES DAILY
Status: DISCONTINUED | OUTPATIENT
Start: 2023-12-07 | End: 2023-12-09 | Stop reason: HOSPADM

## 2023-12-07 RX ORDER — AZITHROMYCIN 250 MG/1
500 TABLET, FILM COATED ORAL ONCE
Status: DISCONTINUED | OUTPATIENT
Start: 2023-12-07 | End: 2023-12-07

## 2023-12-07 RX ORDER — ASPIRIN 81 MG/1
81 TABLET ORAL DAILY
Status: DISCONTINUED | OUTPATIENT
Start: 2023-12-08 | End: 2023-12-07

## 2023-12-07 RX ADMIN — MAGNESIUM SULFATE IN DEXTROSE 1 G: 10 INJECTION, SOLUTION INTRAVENOUS at 04:01

## 2023-12-07 RX ADMIN — LEVOTHYROXINE SODIUM 150 MCG: 0.07 TABLET ORAL at 12:01

## 2023-12-07 RX ADMIN — DOCUSATE SODIUM 50MG AND SENNOSIDES 8.6MG 2 TABLET: 8.6; 5 TABLET, FILM COATED ORAL at 06:07

## 2023-12-07 RX ADMIN — NOREPINEPHRINE BITARTRATE 0.1 MCG/KG/MIN: 1 INJECTION, SOLUTION, CONCENTRATE INTRAVENOUS at 04:15

## 2023-12-07 RX ADMIN — DOCUSATE SODIUM 50MG AND SENNOSIDES 8.6MG 2 TABLET: 8.6; 5 TABLET, FILM COATED ORAL at 17:11

## 2023-12-07 RX ADMIN — ATORVASTATIN CALCIUM 80 MG: 40 TABLET, FILM COATED ORAL at 10:05

## 2023-12-07 RX ADMIN — TAMSULOSIN HYDROCHLORIDE 0.4 MG: 0.4 CAPSULE ORAL at 06:07

## 2023-12-07 RX ADMIN — IPRATROPIUM BROMIDE AND ALBUTEROL SULFATE 3 ML: .5; 3 SOLUTION RESPIRATORY (INHALATION) at 10:37

## 2023-12-07 RX ADMIN — IPRATROPIUM BROMIDE AND ALBUTEROL SULFATE 3 ML: .5; 2.5 SOLUTION RESPIRATORY (INHALATION) at 02:50

## 2023-12-07 RX ADMIN — CEFTRIAXONE SODIUM 2000 MG: 2 INJECTION, POWDER, FOR SOLUTION INTRAMUSCULAR; INTRAVENOUS at 06:10

## 2023-12-07 RX ADMIN — POTASSIUM CHLORIDE 40 MEQ: 1500 TABLET, EXTENDED RELEASE ORAL at 07:10

## 2023-12-07 RX ADMIN — SODIUM CHLORIDE, POTASSIUM CHLORIDE, SODIUM LACTATE AND CALCIUM CHLORIDE: 600; 310; 30; 20 INJECTION, SOLUTION INTRAVENOUS at 23:10

## 2023-12-07 RX ADMIN — AZITHROMYCIN DIHYDRATE 500 MG: 250 TABLET, FILM COATED ORAL at 02:45

## 2023-12-07 RX ADMIN — SODIUM CHLORIDE, POTASSIUM CHLORIDE, SODIUM LACTATE AND CALCIUM CHLORIDE 1000 ML: 600; 310; 30; 20 INJECTION, SOLUTION INTRAVENOUS at 02:54

## 2023-12-07 RX ADMIN — PIPERACILLIN AND TAZOBACTAM 4.5 G: 4; .5 INJECTION, POWDER, FOR SOLUTION INTRAVENOUS at 02:45

## 2023-12-07 RX ADMIN — ACETAMINOPHEN 650 MG: 325 TABLET ORAL at 23:55

## 2023-12-07 RX ADMIN — OXYBUTYNIN CHLORIDE 5 MG: 5 TABLET ORAL at 21:54

## 2023-12-07 RX ADMIN — ASPIRIN 325 MG: 325 TABLET ORAL at 10:05

## 2023-12-07 RX ADMIN — LIDOCAINE HYDROCHLORIDE 15 ML: 20 SOLUTION ORAL; TOPICAL at 23:23

## 2023-12-07 RX ADMIN — HEPARIN SODIUM 12 UNITS/KG/HR: 5000 INJECTION, SOLUTION INTRAVENOUS at 10:48

## 2023-12-07 RX ADMIN — FUROSEMIDE 20 MG: 10 INJECTION, SOLUTION INTRAVENOUS at 07:10

## 2023-12-07 RX ADMIN — ACETAMINOPHEN 650 MG: 325 TABLET ORAL at 03:00

## 2023-12-07 RX ADMIN — POTASSIUM CHLORIDE 40 MEQ: 1500 TABLET, EXTENDED RELEASE ORAL at 17:11

## 2023-12-07 RX ADMIN — IOHEXOL 55 ML: 350 INJECTION, SOLUTION INTRAVENOUS at 03:31

## 2023-12-07 RX ADMIN — POTASSIUM CHLORIDE 40 MEQ: 1500 TABLET, EXTENDED RELEASE ORAL at 03:30

## 2023-12-07 RX ADMIN — VANCOMYCIN HYDROCHLORIDE 1500 MG: 1 INJECTION, POWDER, LYOPHILIZED, FOR SOLUTION INTRAVENOUS at 02:59

## 2023-12-07 RX ADMIN — HEPARIN SODIUM 16 UNITS/KG/HR: 5000 INJECTION, SOLUTION INTRAVENOUS at 18:18

## 2023-12-07 RX ADMIN — METHYLPREDNISOLONE SODIUM SUCCINATE 125 MG: 125 INJECTION, POWDER, FOR SOLUTION INTRAMUSCULAR; INTRAVENOUS at 04:02

## 2023-12-07 RX ADMIN — FERROUS SULFATE TAB 325 MG (65 MG ELEMENTAL FE) 325 MG: 325 (65 FE) TAB at 21:54

## 2023-12-07 RX ADMIN — HUMAN ALBUMIN MICROSPHERES AND PERFLUTREN 3 ML: 10; .22 INJECTION, SOLUTION INTRAVENOUS at 11:51

## 2023-12-07 RX ADMIN — SODIUM CHLORIDE, POTASSIUM CHLORIDE, SODIUM LACTATE AND CALCIUM CHLORIDE: 600; 310; 30; 20 INJECTION, SOLUTION INTRAVENOUS at 17:15

## 2023-12-07 RX ADMIN — IPRATROPIUM BROMIDE AND ALBUTEROL SULFATE 3 ML: .5; 3 SOLUTION RESPIRATORY (INHALATION) at 07:58

## 2023-12-07 RX ADMIN — HEPARIN SODIUM 1700 UNITS: 1000 INJECTION, SOLUTION INTRAVENOUS; SUBCUTANEOUS at 18:17

## 2023-12-07 RX ADMIN — HEPARIN SODIUM 3400 UNITS: 1000 INJECTION, SOLUTION INTRAVENOUS; SUBCUTANEOUS at 10:45

## 2023-12-07 ASSESSMENT — COGNITIVE AND FUNCTIONAL STATUS - GENERAL
SUGGESTED CMS G CODE MODIFIER MOBILITY: CK
SUGGESTED CMS G CODE MODIFIER DAILY ACTIVITY: CJ
HELP NEEDED FOR BATHING: A LITTLE
MOBILITY SCORE: 19
CLIMB 3 TO 5 STEPS WITH RAILING: A LITTLE
DRESSING REGULAR LOWER BODY CLOTHING: A LITTLE
TOILETING: A LITTLE
MOVING FROM LYING ON BACK TO SITTING ON SIDE OF FLAT BED: A LITTLE
WALKING IN HOSPITAL ROOM: A LITTLE
DAILY ACTIVITIY SCORE: 20
DRESSING REGULAR UPPER BODY CLOTHING: A LITTLE
MOVING TO AND FROM BED TO CHAIR: A LITTLE
STANDING UP FROM CHAIR USING ARMS: A LITTLE

## 2023-12-07 ASSESSMENT — PATIENT HEALTH QUESTIONNAIRE - PHQ9
1. LITTLE INTEREST OR PLEASURE IN DOING THINGS: NOT AT ALL
SUM OF ALL RESPONSES TO PHQ9 QUESTIONS 1 AND 2: 0
2. FEELING DOWN, DEPRESSED, IRRITABLE, OR HOPELESS: NOT AT ALL

## 2023-12-07 ASSESSMENT — PAIN DESCRIPTION - PAIN TYPE
TYPE: ACUTE PAIN
TYPE: ACUTE PAIN

## 2023-12-07 ASSESSMENT — FIBROSIS 4 INDEX
FIB4 SCORE: 2.6
FIB4 SCORE: 4.55

## 2023-12-07 ASSESSMENT — LIFESTYLE VARIABLES
EVER HAD A DRINK FIRST THING IN THE MORNING TO STEADY YOUR NERVES TO GET RID OF A HANGOVER: NO
ON A TYPICAL DAY WHEN YOU DRINK ALCOHOL HOW MANY DRINKS DO YOU HAVE: 0
CONSUMPTION TOTAL: NEGATIVE
TOTAL SCORE: 0
EVER FELT BAD OR GUILTY ABOUT YOUR DRINKING: NO
AVERAGE NUMBER OF DAYS PER WEEK YOU HAVE A DRINK CONTAINING ALCOHOL: 0
HAVE PEOPLE ANNOYED YOU BY CRITICIZING YOUR DRINKING: NO
TOTAL SCORE: 0
ALCOHOL_USE: NO
HOW MANY TIMES IN THE PAST YEAR HAVE YOU HAD 5 OR MORE DRINKS IN A DAY: 0
TOTAL SCORE: 0
HAVE YOU EVER FELT YOU SHOULD CUT DOWN ON YOUR DRINKING: NO

## 2023-12-07 NOTE — DIETARY
"Nutrition services: Day 0 of admit.  King Alegria is a 78 y.o. male with admitting DX of  Acute respiratory failure with hypoxia     Consult received for MST of 4 on nutrition screen due to report of >/=34 lb wt loss x > 1 year. No report of poor PO PTA      Assessment:  Height: 170.2 cm (5' 7\")  Weight: 55.9 kg (123 lb 3.8 oz)  Body mass index is 19.3 kg/m²., BMI classification: underweight based on pt's age  Diet/Intake: NPO w/ sips w/ meds    Evaluation:   Lower extremity edema noted in H&P.   Pt reports 98 lb wt loss > 1 year ago. He said that his wt and PO intake has been stable x 7 months.   Pt said that he normally eats 2 meals a day- lunch and dinner. He and his wife receive meals from the [employer] in [place]. He was also receiving free supplements such as Ensure or Boost from the [facility]. He would like to try chocolate Boost Glucose Control when diet is started.   Per nurse pt is NPO in anticipation of possible procedure. When diet is started, pt will receive a regular diet.     Malnutrition Risk: ASPEN criteria not met    Recommendations/Plan:  Initiate diet when medically feasible.  Provide chocolate Boost supplement when diet started.    Encourage intake of >50%  Document intake of all meals and supplements  as % taken in ADL's to provide interdisciplinary communication across all shifts.   Monitor weight.  Nutrition rep will continue to see patient for ongoing meal and snack preferences.       RD will follow  "

## 2023-12-07 NOTE — ASSESSMENT & PLAN NOTE
Chronic with no significant change  White blood cell count is near his baseline however there is a left shift but looking at his labs he has a chronic left shift  Repeat CBC in the morning

## 2023-12-07 NOTE — ED TRIAGE NOTES
"Chief Complaint   Patient presents with    Chest Pain     Patient reports chest pain and shortness of breath, worsening over last week. Patient currently taking antibiotics for eye infection, dc'd from hospital recently      BP 94/50   Pulse 91   Temp (!) 38.9 °C (102 °F) (Rectal)   Resp (!) 31   Ht 1.702 m (5' 7\")   Wt 56.7 kg (125 lb)   SpO2 100%   BMI 19.58 kg/m²     "

## 2023-12-07 NOTE — PROGRESS NOTES
4 Eyes Skin Assessment Completed by JUSTIN Dawkins and JUSTIN Rodrigez.    Head WDL  Ears Redness and Blanching  Nose WDL  Mouth WDL  Neck WDL  Breast/Chest WDL  Shoulder Blades WDL  Spine WDL  (R) Arm/Elbow/Hand WDL  (L) Arm/Elbow/Hand WDL  Abdomen WDL  Groin WDL  Scrotum/Coccyx/Buttocks Redness and Blanching  (R) Leg WDL  (L) Leg WDL  (R) Heel/Foot/Toe WDL  (L) Heel/Foot/Toe WDL          Devices In Places ECG, Blood Pressure Cuff, Pulse Ox, and SCD's      Interventions In Place NC W/Ear Foams, Sacral Mepilex, Pillows, and Low Air Loss Mattress    Possible Skin Injury No    Pictures Uploaded Into Epic N/A  Wound Consult Placed N/A  RN Wound Prevention Protocol Ordered No

## 2023-12-07 NOTE — CARE PLAN
The patient is Watcher - Medium risk of patient condition declining or worsening    Shift Goals  Clinical Goals: maintain MAP >60 or SBP >90 while weaning off levophed  Patient Goals: rest    Progress made toward(s) clinical / shift goals:  Paused levophed to remove pt's T-shirt on arrival from ER. MAP and SBP remained at or above goal with levophed paused. Levophed remains off. Pt appears to be resting with no signs of discomfort or distress.       Problem: Impaired Gas Exchange  Goal: Patient will demonstrate improved ventilation and adequate oxygenation and participate in treatment regimen within the level of ability/situation.  Outcome: Progressing     Problem: Hemodynamics  Goal: Patient's hemodynamics, fluid balance and neurologic status will be stable or improve  Outcome: Progressing

## 2023-12-07 NOTE — ASSESSMENT & PLAN NOTE
Patient has significantly decreased breath sounds with some mild expiratory wheeze  Potentially secondary to pneumonia, procalcitonin and CTA chest pending  Possibly secondary to pulmonary edema  Start IV steroids  Start respiratory care per protocol  Continuous pulse ox monitoring

## 2023-12-07 NOTE — ASSESSMENT & PLAN NOTE
Patient was recently diagnosed with hypothyroidism, started on Synthroid  Apparently too high of a dose, for now I will hold his Synthroid due to his TSH now being low and his free T4 being elevated

## 2023-12-07 NOTE — ED PROVIDER NOTES
ED Provider Note    CHIEF COMPLAINT  Chief Complaint   Patient presents with    Chest Pain     Patient reports chest pain and shortness of breath, worsening over last week. Patient currently taking antibiotics for eye infection, dc'd from hospital recently        EXTERNAL RECORDS REVIEWED  Inpatient Notes patient was discharged from the hospital medicine service 11/13/2023 after admission 11/8/2023.  He was admitted after presenting with eye pain redness and swelling.  Noted history of COPD on chronic 2 L of oxygen, CAD, MI, hypertension, rheumatoid arthritis, myelodysplastic syndrome and aplastic anemia [facility]. CTA of the head and orbit demonstrated bilateral extraocular muscle stranding and enlargement and a small fluid collection anterior and lateral to the left globe likely representing a small abscess.  He was admitted for IV antibiotics and steroids.  He was diagnosed with thyroid orbitopathy and his TSH was found to be in the 50s with both T3 and T4 being low.  His levothyroxine dosage was increased.  He was transitioned to oral doxycycline and Augmentin.  Blood cultures with no growth.  He was ultimately discharged with diagnosis of orbital cellulitis and hypothyroidism, aplastic crisis.    HPI/ROS  LIMITATION TO HISTORY   Select: : None  OUTSIDE HISTORIAN(S):  EMS report that the patient called due to chest pain and shortness of breath.  He was found to not be wearing his oxygen with an SpO2 of 50% which improved to 90s on 4 L.  They report that he has a baseline on 2 L of oxygen.    King Alegria is a 78 y.o. male who presents to the emergency department for evaluation of chest pain, shortness of breath and cough.  Patient reports chest pain for the last 3 days that got worse tonight.  It is described as a substernal chest pressure that is constant.  Reports it is worse with coughing.  He also reports associated shortness of breath that has been getting worse for the last few days.  He  "reports chills but no fever.  Reports chronic leg swelling that is slightly worse today.  He states he has had heart attacks previously and this feels like a \"early heart attack\".    PAST MEDICAL HISTORY   has a past medical history of Arthritis, Chronic respiratory failure with hypoxia ([facility]) (09/15/2023), COPD (chronic obstructive pulmonary disease) ([facility]), Former smoker (09/15/2023), Heart attack ([facility]) (1998), Heart attack ([facility]) (1999), Hyperlipidemia, Hypertension, Lung nodule (09/15/2023), Pancytopenia ([facility]) (09/15/2023), Prostate cancer ([facility]) (2 years ago), and Rheumatoid arthritis ([facility]) (12/15/2015).    SURGICAL HISTORY  patient denies any surgical history    FAMILY HISTORY  Family History   Problem Relation Age of Onset    Cancer Mother        SOCIAL HISTORY  Social History     Tobacco Use    Smoking status: Former     Current packs/day: 0.00     Average packs/day: 2.0 packs/day for 50.0 years (100.0 ttl pk-yrs)     Types: Cigarettes     Start date: 11/30/1963     Quit date: 11/30/2013     Years since quitting: 10.0     Passive exposure: Never    Smokeless tobacco: Never   Vaping Use    Vaping Use: Never used   Substance and Sexual Activity    Alcohol use: No     Comment: ETOH abue in the past    Drug use: No    Sexual activity: Not on file       CURRENT MEDICATIONS  Home Medications       Reviewed by Callie Low R.N. (Registered Nurse) on 12/07/23 at 0223  Med List Status: Partial     Medication Last Dose Status   ascorbic acid (VITAMIN C) 500 MG tablet  Active   aspirin EC (ECOTRIN) 81 MG Tablet Delayed Response  Active   atenolol (TENORMIN) 25 MG Tab  Active   atorvastatin (LIPITOR) 80 MG tablet  Active   cetirizine (ZYRTEC) 10 MG Tab  Active   Cyanocobalamin (VITAMIN B-12) 1000 MCG Tab  Active   docusate calcium (SURFAK) 240 MG Cap  Active   ferrous sulfate 325 (65 Fe) MG tablet  Active   isosorbide mononitrate SR (IMDUR) 60 MG TABLET SR 24 HR  Active   levothyroxine (SYNTHROID) 150 MCG Tab  Active " "  oxybutynin (DITROPAN) 5 MG Tab  Active   potassium chloride SA (KDUR) 20 MEQ Tab CR  Active   pyridoxine (VITAMIN B-6) 50 MG Tab  Active   tamsulosin (FLOMAX) 0.4 MG capsule  Active   vitamin D (CHOLECALCIFEROL) 1000 UNIT Tab  Active                    ALLERGIES  No Known Allergies    PHYSICAL EXAM  VITAL SIGNS: BP 94/50   Pulse 91   Temp (!) 38.9 °C (102 °F) (Rectal)   Resp (!) 31   Ht 1.702 m (5' 7\")   Wt 56.7 kg (125 lb)   SpO2 100%   BMI 19.58 kg/m²    Constitutional: Ill-appearing elderly male  HEENT: Atraumatic, normocephalic, pupils are equal round reactive to light, nose normal, mouth shows dry mucous membranes  Neck: Supple, no JVD, no tracheal deviation  Cardiovascular: Regular rate and rhythm, no murmur, rub or gallop, 2+ pulses peripherally x4  Thorax & Lungs: Tachypneic, diminished throughout, scattered end expiratory wheezing, crackles in the mid to lower lung fields.  GI: Soft, non-distended, non-tender, no rebound  Skin: Warm, dry, no acute rash or lesion  Musculoskeletal: Moving all extremities, no acute deformity, 2+ bilateral lower extremity edema, no tenderness  Neurologic: A&Ox3, at baseline mentation, cranial nerves II through XII are grossly intact, no sensory deficit, no ataxia  Psychiatric: Appropriate affect for situation at this time      DIAGNOSTIC STUDIES / PROCEDURES  EKG  I have independently interpreted this EKG  Results for orders placed or performed during the hospital encounter of 23   EKG (Now)   Result Value Ref Range    Report       [facility] Emergency Dept[facility]    Test Date:  2023  Pt Name:    DOUGLAS KHAN                Department: [facility]  MRN:        [id number]                      [facility]  Gender:     Male                         Technician: yang  :        1945                   Requested By:JAYRO PRAJAPATI  Order #:    [id number]                    Reading MD: Jayro Prajapati    Measurements  Intervals               "                  Axis  Rate:       90                           P:          19  OR:         155                          QRS:        71  QRSD:       110                          T:          -67  QT:         343  QTc:        420    Interpretive Statements  sinus rhythm at a rate of 90, normal axis, normal intervals, no ST elevation  or depression, T wave inversion in 2, 3, aVF, V4, V5, V6.  No old EKG for  comparison.  No STEMI.  Electronically Signed On 12- 02:20:25 PST by Juan Prajapati           LABS  Labs Reviewed   CBC WITH DIFFERENTIAL - Abnormal; Notable for the following components:       Result Value    WBC 4.0 (*)     RBC 2.01 (*)     Hemoglobin 7.5 (*)     Hematocrit 23.1 (*)     .9 (*)     MCH 37.3 (*)     RDW 73.7 (*)     Platelet Count 154 (*)     Neutrophils-Polys 78.00 (*)     Lymphocytes 5.80 (*)     Monocytes 14.10 (*)     Immature Granulocytes 1.80 (*)     Lymphs (Absolute) 0.23 (*)     All other components within normal limits    Narrative:     Biotin intake of greater than 5 mg per day may interfere with  troponin levels, causing false low values.   COMP METABOLIC PANEL - Abnormal; Notable for the following components:    Potassium 3.1 (*)     Glucose 135 (*)     ALT(SGPT) 55 (*)     Alkaline Phosphatase 126 (*)     Albumin 2.9 (*)     All other components within normal limits    Narrative:     Biotin intake of greater than 5 mg per day may interfere with  troponin levels, causing false low values.   TROPONIN - Abnormal; Notable for the following components:    Troponin T 71 (*)     All other components within normal limits    Narrative:     Biotin intake of greater than 5 mg per day may interfere with  troponin levels, causing false low values.   PROBRAIN NATRIURETIC PEPTIDE, NT - Abnormal; Notable for the following components:    NT-proBNP 3447 (*)     All other components within normal limits    Narrative:     Biotin intake of greater than 5 mg per day may interfere  "with  troponin levels, causing false low values.   TSH WITH REFLEX TO FT4 - Abnormal; Notable for the following components:    TSH 0.044 (*)     All other components within normal limits    Narrative:     Biotin intake of greater than 5 mg per day may interfere with  troponin levels, causing false low values.   LACTIC ACID    Narrative:     Biotin intake of greater than 5 mg per day may interfere with  troponin levels, causing false low values.   COV-2, FLU A/B, AND RSV BY PCR (CEPPolarID)   ESTIMATED GFR    Narrative:     Biotin intake of greater than 5 mg per day may interfere with  troponin levels, causing false low values.   BLOOD CULTURE    Narrative:     Per Hospital Policy: Only change Specimen Src: to \"Line\" if  specified by physician order.   BLOOD CULTURE    Narrative:     Per Hospital Policy: Only change Specimen Src: to \"Line\" if  specified by physician order.   URINALYSIS   URINE CULTURE(NEW)   FREE THYROXINE    Narrative:     Biotin intake of greater than 5 mg per day may interfere with  troponin levels, causing false low values.         RADIOLOGY  I have independently interpreted the diagnostic imaging associated with this visit and am waiting the final reading from the radiologist.   My preliminary interpretation is as follows: X-ray without clear focal consolidation consistent with pneumonia.  Bilateral interstitial infiltrates present    Radiologist interpretation:   DX-CHEST-PORTABLE (1 VIEW)   Final Result         1.  Interstitial pulmonary parenchymal prominence suggest chronic underlying lung disease, component of interstitial edema and/or infiltrates not excluded.      CT-CTA CHEST PULMONARY ARTERY W/ RECONS    (Results Pending)       COURSE & MEDICAL DECISION MAKING    ED Observation Status? No; Patient does not meet criteria for ED Observation.     INITIAL ASSESSMENT, COURSE AND PLAN  Care Narrative:     2:10 AM Ill-appearing 78-year-old male with a complex past medical history including COPD " on 2 L of oxygen at baseline, hypothyroidism recently nontherapeutic on levothyroxine, recent orbital cellulitis requiring admission, coronary artery disease, MI, myelodysplastic syndrome brought in from home for evaluation of shortness of breath and chest pain, cough.  Quite diminished breath sounds, tachypnea and hypoxia requiring 4 L of oxygen.  Patient also septic with hypotension and fever.  I am concerned for pneumonia, COVID, pneumonitis, bacteremia.  No clinical evidence of ongoing orbital compartment syndrome with no complaints suggestive of this at this time.  Considered PE given his recent hospitalization and he does have edema however it is symmetric of bilateral lower extremities, chest pain is not pleuritic and he is not tachycardic.  EKG likewise without evidence of acute right heart strain.  Feel infectious etiology of symptoms is much more likely.  May consider CT angiogram if chest x-ray is unrevealing.  Of note patient is on beta-blockers which may be blunting his cardiac response.  Hyperthermia argues against myxedema.  Given his history of myelodysplastic syndrome and recent admission with IV antibiotics he is at high risk for multidrug-resistant organisms.  Will antibiosis broadly with vancomycin, Zosyn and azithromycin.  Will provide DuoNeb.  Will provide judicious IV fluids because patient reports a history of heart failure and has 2+ edema of bilateral lower extremities today.  Do not want to exacerbate underlying heart failure.    2:47 AM point-of-care cardiac ultrasound performed.  No RV dilation.  Diffuse B-lines present.  Possible infectious etiology versus edema.  More likely infectious given fever, cough.  Patient denies any ongoing chest pain.    For the labs reviewed.  BNP significantly elevated greater than 3000.  Troponin elevated at 71.  Will provide aspirin.  Will continue to trend troponin.  Given resolution of pain, mixed picture, unclear if this is type II demand ischemia  versus heart failure exacerbation will hold on heparin initiation.  I do feel that CT angiography is warranted however as chest x-ray has not overtly positive for pneumonia and patient does present with chest pain, hypotension, hypoxia in the setting of recent hospitalization concerning for PE.    3:20 AM on reassessment patient continues to deny any ongoing chest pain.  Reports shortness of breath improved significantly after DuoNeb.  Will provide Solu-Medrol for presumed COPD exacerbation.  Reviewed images from CT angiogram.  No large saddle pulmonary embolism appreciated.  Patient's blood pressure maintaining with a MAP around 65.  Point-of-care ultrasound repeated demonstrating a large plethoric IVC.  At this point we will hold on further IV fluid resuscitation and provide pressors in the event blood pressure continues to drop.  Patient will receive fluids with his broad antibiotic therapy.  At this point given his multiple comorbid acute medical problems including concern for NSTEMI, COPD exacerbation, new onset heart failure, electrolyte derangements, pancytopenia, tenuous blood pressure will elect for ICU admission.    3:43 AM Case discussed with Dr. Perry admitting hospitalist.  Agrees with plan for ICU admission.    4:08 AM blood pressure rechecked.  MAP slowly downtrending.  MAP of 57.  Given concern for hypervolemia, plethoric IVC we will plan for low-dose norepinephrine administration.    CRITICAL CARE  The very real possibilty of a deterioration of this patient's condition required the highest level of my preparedness for sudden, emergent intervention.  I provided critical care services, which included medication orders, frequent reevaluations of the patient's condition and response to treatment, ordering and reviewing test results, and discussing the case with various consultants.  The critical care time associated with the care of the patient was 60 minutes. Review chart for interventions. This time  is exclusive of any other billable procedures.       HYDRATION: Based on the patient's presentation of Sepsis the patient was given IV fluids. IV Hydration was used because oral hydration was not adequate alone. Upon recheck following hydration, the patient was stable to improved.      ADDITIONAL PROBLEM LIST  Pancytopenia, hypokalemia, hypervolemia, hypothyroidism, acute hypoxic respiratory failure, COPD exacerbation    DISPOSITION AND DISCUSSIONS  I have discussed management of the patient with the following physicians and COLLIN's: Dr. Perry hospitalist    Discussion of management with other [facility] or appropriate source(s): None     Decision tools and prescription drugs considered including, but not limited to: Antibiotics broad-spectrum including Zosyn, vancomycin and azithromycin to cover most likely respiratory etiology of severe sepsis in the setting of recent hospitalization and IV antibiotic use. .    FINAL DIAGNOSIS  1. Severe sepsis (HCC)    2. Acute hypoxic respiratory failure (HCC)    3. Aplastic anemia (HCC)    4. Pancytopenia (HCC)    5. Hypokalemia    6. Acute chest pain    7. Acute exacerbation of chronic obstructive pulmonary disease (COPD) (HCC)    8. Hypothyroidism, unspecified type    9. Other hypervolemia    10.    Critical care time of 60 minutes    Electronically signed by: Juan Prajapati M.D., 12/7/2023 2:10 AM

## 2023-12-07 NOTE — ASSESSMENT & PLAN NOTE
Significant anemia with slight worsening from his apparent baseline  He is on iron supplementation which I will continue however it is a macrocytic anemia  Obtain vitamin B12  Transfuse as needed  No sign of gross bleeding  Repeat CBC in the morning

## 2023-12-07 NOTE — ASSESSMENT & PLAN NOTE
Due to COPD exacerbation  Interstitial edema and/or infiltrates noted on chest x-ray  Patient was given broad-spectrum antibiotics in the ER, will continue with Rocephin and azithromycin  Await procalcitonin and blood cultures  Await CTA chest

## 2023-12-07 NOTE — ASSESSMENT & PLAN NOTE
Mild elevation in his troponin, normal kidney function  Patient did have chest pain initially, continue to trend troponin  Monitor patient on telemetry  Associated with worsening lower extremity edema  Obtain echocardiogram

## 2023-12-07 NOTE — ASSESSMENT & PLAN NOTE
While patient does have a fever as well as tachypnea, thus far there is no definitive infection so I am unsure what is causing his shock  Patient will be placed in the ICU and started on Levophed  Wean as able  Await procalcitonin and CTA chest, if these do show definitive infection, likely sepsis but still unable to give significant fluid boluses due to worsening lower extremity edema concern for CHF    Patient is critically ill with life-threatening shock requiring Levophed  Does require close and continuous monitoring in the ICU  If patient was not placed on Levophed, would certainly continue to worsen and risk of cardiopulmonary arrest  Critical care time, not including procedures, no overlap: 38 minutes

## 2023-12-07 NOTE — CONSULTS
"Critical Care Consult    Date of Admission: 12/7/23  Date of Consult: 12/7/23  Consulting: Dr. Miquel Perry  Reason for consult: Acute respiratory failure    Chief Complaint:  Chief Complaint   Patient presents with    Chest Pain     Patient reports chest pain and shortness of breath, worsening over last week. Patient currently taking antibiotics for eye infection, dc'd from hospital recently      HPI:   From Dr. Martinez's note: \"King Alegria is a 78 y.o. male who presented 12/7/2023 with chest pain and shortness of breath.  At the time of my exam, patient is somnolent and nonverbal, information obtained from the chart.  He had apparently been having chest pain for the last 3 days, became worse today as well as did his shortness of breath.  He had described it as substernal pressure and constant.  Patient states it is worse with coughing which he also began doing.  Patient does have a history of COPD.  He also noted his leg swelling was worse.  I did discuss the case including labs and imaging with the ER physician. \"    Patient has been seen by pulmonary at [facility] in the past: Last OV with me Sept 2023 for: \"Lung nodule, COPD with chronic hypoxic respiratory failure, former smoker, rheumatoid arthritis, h/o prostate cancer\"    he has no prior PFTs  he has 0 ED or Urgent Care visits in the past year for respiratory issues.    24h events: Febrile. On 4 lpm. Chest pain x 3 days. Denies currently. No drips. A&Ox2. Poor historian. Recent cellulitis of his eye.   Imaging: reviewed   Notable lab trends: Stable pancytopenia. Hypothyroid. BNP 3447.   Tmax: 102F  ProCal: 0.12 on 12/7  Antibiotics: Zosyn, Vancomycin, Azithromycin   Steroids: Solumedrol 40 mg IV 12/7 -   Cultures: BC 12/7, UA not suggestive of UTI. COVID/Flu/RSV neg.   PPX: Heparin drip for ACS      Past Medical History:   Diagnosis Date    Arthritis     Chronic respiratory failure with hypoxia (HCC) 09/15/2023    Uses 2lpm at rest, 3lpm with ambulation and " at night    COPD (chronic obstructive pulmonary disease) (HCC)     Former smoker 09/15/2023    Heart attack (HCC) 1998    Heart attack (HCC) 1999    Had stents placed last month    Hyperlipidemia     Hypertension     Lung nodule 09/15/2023    Pancytopenia (HCC) 09/15/2023    Prostate cancer (HCC) 2 years ago    Had seeds    Rheumatoid arthritis ([facility]) 12/15/2015       History reviewed. No pertinent surgical history.    Social History     Socioeconomic History    Marital status:      Spouse name: Not on file    Number of children: Not on file    Years of education: Not on file    Highest education level: Not on file   Occupational History    Not on file   Tobacco Use    Smoking status: Former     Current packs/day: 0.00     Average packs/day: 2.0 packs/day for 50.0 years (100.0 ttl pk-yrs)     Types: Cigarettes     Start date: 11/30/1963     Quit date: 11/30/2013     Years since quitting: 10.0     Passive exposure: Never    Smokeless tobacco: Never   Vaping Use    Vaping Use: Never used   Substance and Sexual Activity    Alcohol use: No     Comment: ETOH abue in the past    Drug use: No    Sexual activity: Not on file   Other Topics Concern    Not on file   Social History Narrative    Retired     Social Determinants of Health     Financial Resource Strain: Not on file   Food Insecurity: Not on file   Transportation Needs: Not on file   Physical Activity: Not on file   Stress: Not on file   Social Connections: Not on file   Intimate Partner Violence: Not on file   Housing Stability: Not on file          Family History   Problem Relation Age of Onset    Cancer Mother        No current facility-administered medications on file prior to encounter.     Current Outpatient Medications on File Prior to Encounter   Medication Sig Dispense Refill    levothyroxine (SYNTHROID) 150 MCG Tab Take 1 Tablet by mouth every morning on an empty stomach. 30 Tablet 0    potassium chloride SA (KDUR) 20 MEQ Tab CR Take 20 mEq by mouth  "2 times a day.      Cyanocobalamin (VITAMIN B-12) 1000 MCG Tab Take 1,000 mcg by mouth every morning.      pyridoxine (VITAMIN B-6) 50 MG Tab Take 50 mg by mouth every morning.      cetirizine (ZYRTEC) 10 MG Tab Take 10 mg by mouth every morning.      ascorbic acid (VITAMIN C) 500 MG tablet Take 500 mg by mouth every morning.      isosorbide mononitrate SR (IMDUR) 60 MG TABLET SR 24 HR Take 60 mg by mouth every morning.      docusate calcium (SURFAK) 240 MG Cap Take 240 mg by mouth at bedtime.      ferrous sulfate 325 (65 Fe) MG tablet Take 325 mg by mouth at bedtime.      oxybutynin (DITROPAN) 5 MG Tab Take 5 mg by mouth at bedtime.      aspirin EC (ECOTRIN) 81 MG Tablet Delayed Response Take 81 mg by mouth at bedtime.      atenolol (TENORMIN) 25 MG Tab Take 25 mg by mouth every morning.      atorvastatin (LIPITOR) 80 MG tablet Take 80 mg by mouth every evening.      vitamin D (CHOLECALCIFEROL) 1000 UNIT Tab Take 1,000 Units by mouth every day.      tamsulosin (FLOMAX) 0.4 MG capsule Take 0.4 mg by mouth every morning.         Allergies: Patient has no known allergies.      Vitals:  /54   Pulse 64   Temp 37.7 °C (99.8 °F) (Temporal)   Resp 17   Ht 1.702 m (5' 7\")   Wt 55.9 kg (123 lb 3.8 oz)   SpO2 98%     Physical Exam:  Physical Exam  Vitals reviewed.   Constitutional:       General: He is not in acute distress.     Appearance: He is normal weight. He is not ill-appearing, toxic-appearing or diaphoretic.   HENT:      Mouth/Throat:      Pharynx: Oropharynx is clear.   Eyes:      General: No scleral icterus.        Right eye: No discharge.         Left eye: No discharge.      Extraocular Movements: Extraocular movements intact.      Conjunctiva/sclera: Conjunctivae normal.      Pupils: Pupils are equal, round, and reactive to light.   Cardiovascular:      Rate and Rhythm: Normal rate.      Heart sounds: No murmur heard.  Pulmonary:      Effort: Pulmonary effort is normal.      Breath sounds: Normal " breath sounds.   Musculoskeletal:      Right lower leg: Edema present.      Left lower leg: Edema present.      Comments: Trace edema    Skin:     General: Skin is warm.      Capillary Refill: Capillary refill takes less than 2 seconds.      Coloration: Skin is not jaundiced.   Neurological:      General: No focal deficit present.      Mental Status: He is alert.      Comments: A&Ox2   Psychiatric:         Behavior: Behavior normal.       Laboratory Data: I personally reviewed labs including historical lab trends.     Immunization History   Administered Date(s) Administered    Influenza (IM) Preservative Free - HISTORICAL DATA 10/22/2010, 10/05/2018, 09/30/2019    Influenza Seasonal Injectable - Historical Data 10/06/2011    Influenza Vaccine Adult HD 09/22/2015, 09/27/2016, 10/27/2017, 10/07/2020, 09/11/2023    Influenza Vaccine H1N1 - HISTORICAL DATA 01/27/2010    Influenza Vaccine Quad Inj (Pf) 09/23/2021, 10/06/2022    Influenza, Unspecified - HISTORICAL DATA 10/07/2003, 10/10/2004, 11/01/2005, 01/08/2007, 11/02/2007, 11/03/2008, 11/15/2009, 09/21/2011, 09/17/2012, 09/05/2013, 09/30/2014, 10/14/2020    MODERNA SARS-COV-2 VACCINE (12+) 02/01/2021, 03/01/2021, 01/18/2022    Pneumococcal Conjugate Vaccine (Prevnar/PCV-13) 02/25/2016    Pneumococcal Vaccine (UF) - HISTORICAL DATA 09/03/2002    Pneumococcal polysaccharide vaccine (PPSV-23) 10/08/2007, 11/01/2009, 01/12/2015    Tdap Vaccine 05/15/2012, 01/24/2023    Zoster Vaccine Live (ZVL) (Zostavax) - HISTORICAL DATA 12/10/2014    Zoster Vaccine Recombinant (RZV) (SHINGRIX) 09/12/2018, 01/29/2019       PFTs as reviewed by me personally show: None for review.         Imaging as reviewed by me personally show:  Clear lungs. No PNA or PE detected. Emphysema noted.       Assessment/Plan:    Pulmonary problem list:  #Acute on chronic hypoxic respiratory failure secondary to possible bronchitis vs. ACS  #NSTEMI   #Emphysema   #Possible medication noncompliance   #Tobacco  abuse - in remission  #Hypothyroidism  #Pancytopenia - chronic   #History of MI  #History of HTN  #History of HLP  #History of prostate cancer  #Reported history of RA     Plan:  #Acute on chronic hypoxic respiratory failure secondary to possible bronchitis vs. ACS  #NSTEMI   #History of MI  #History of HTN  #History of HLP    -EKG personally reviewed-concerning for T wave abnormalities in the inferior and lateral leads.  No previous EKG for comparison.  Very mild elevation of troponin.  No current chest pain.  -Load with ASA now and continue with 81 mg daily thereafter  -Heparin drip for ACS protocol  -Start statin now  -ECHO pending - change priority to STAT  -BNP elevated  -Cardiology consulted; spoke to Dr. Ghosh who is recommending potential transfer to [facility] for potential C.   -Imdur and Atenolol held d/t low BP. No ACEi for same reason.     -check respiratory PCR  -empiric coverage with Azithromycin for possible atypical PNA    #Fever  -unclear source, poor historian.   -respiratory PCR ordered  -monitor cultures  -chest imaging and UA unremarkable     #Emphysema   #Tobacco abuse - in remission    -No PFTs available, but emphysema noted on CT imaging. No inhalers on med reconciliation. Remote smoking history.   -no wheezing on exam  -stop steroids    #Hypothyroidism  #Possible medication noncompliance     -thyroid labs suggest HYPOthyroid. Patient unsure if, when, or how he takes his medication     #Pancytopenia - chronic   -Monitor  -Transfuse for Hgb <7 or active bleeding    #History of prostate cancer  #Reported history of RA       The patient remains critically ill. Remaining in ICU for NSTEMI with relative hypotension. Critical care time = 90 minutes in directly providing and coordinating critical care and extensive data review.  No time overlap and excludes procedures.    __________  Jaime Wells, DO  Pulmonary and Critical Care Medicine  [facility]    Please note that this  dictation was created using voice recognition software. The accuracy of the dictation is limited to the abilities of the software. I have made every reasonable attempt to correct obvious errors, but I expect that there are errors of grammar and possibly content that I did not discover before finalizing the note.

## 2023-12-07 NOTE — H&P
Hospital Medicine History & Physical Note    Date of Service  12/7/2023    Primary Care Physician  Pcp Pt States None    Consultants  None    Specialist Names: none    Code Status  Full Code    Chief Complaint  Chief Complaint   Patient presents with    Chest Pain     Patient reports chest pain and shortness of breath, worsening over last week. Patient currently taking antibiotics for eye infection, dc'd from hospital recently        History of Presenting Illness  King Alegria is a 78 y.o. male who presented 12/7/2023 with chest pain and shortness of breath.  At the time of my exam, patient is somnolent and nonverbal, information obtained from the chart.  He had apparently been having chest pain for the last 3 days, became worse today as well as did his shortness of breath.  He had described it as substernal pressure and constant.  Patient states it is worse with coughing which he also began doing.  Patient does have a history of COPD.  He also noted his leg swelling was worse.  I did discuss the case including labs and imaging with the ER physician.    I discussed the plan of care with  ERP .    Review of Systems  Review of Systems   Unable to perform ROS: Patient nonverbal       Past Medical History   has a past medical history of Arthritis, Chronic respiratory failure with hypoxia ([facility]) (09/15/2023), COPD (chronic obstructive pulmonary disease) ([facility]), Former smoker (09/15/2023), Heart attack ([facility]) (1998), Heart attack ([facility]) (1999), Hyperlipidemia, Hypertension, Lung nodule (09/15/2023), Pancytopenia ([facility]) (09/15/2023), Prostate cancer ([facility]) (2 years ago), and Rheumatoid arthritis ([facility]) (12/15/2015).    Surgical History   has no past surgical history on file.     Family History  family history includes Cancer in his mother.   Family history reviewed with patient. There is no family history that is pertinent to the chief complaint.     Social History   reports that he quit smoking about 10 years ago. His smoking  use included cigarettes. He started smoking about 60 years ago. He has a 100.0 pack-year smoking history. He has never been exposed to tobacco smoke. He has never used smokeless tobacco. He reports that he does not drink alcohol and does not use drugs.    Allergies  No Known Allergies    Medications  Prior to Admission Medications   Prescriptions Last Dose Informant Patient Reported? Taking?   Cyanocobalamin (VITAMIN B-12) 1000 MCG Tab   Yes No   Sig: Take 1,000 mcg by mouth every morning.   ascorbic acid (VITAMIN C) 500 MG tablet   Yes No   Sig: Take 500 mg by mouth every morning.   aspirin EC (ECOTRIN) 81 MG Tablet Delayed Response   Yes No   Sig: Take 81 mg by mouth at bedtime.   atenolol (TENORMIN) 25 MG Tab   Yes No   Sig: Take 25 mg by mouth every morning.   atorvastatin (LIPITOR) 80 MG tablet   Yes No   Sig: Take 80 mg by mouth every evening.   cetirizine (ZYRTEC) 10 MG Tab   Yes No   Sig: Take 10 mg by mouth every morning.   docusate calcium (SURFAK) 240 MG Cap   Yes No   Sig: Take 240 mg by mouth at bedtime.   ferrous sulfate 325 (65 Fe) MG tablet   Yes No   Sig: Take 325 mg by mouth at bedtime.   isosorbide mononitrate SR (IMDUR) 60 MG TABLET SR 24 HR   Yes No   Sig: Take 60 mg by mouth every morning.   levothyroxine (SYNTHROID) 150 MCG Tab   No No   Sig: Take 1 Tablet by mouth every morning on an empty stomach.   oxybutynin (DITROPAN) 5 MG Tab   Yes No   Sig: Take 5 mg by mouth at bedtime.   potassium chloride SA (KDUR) 20 MEQ Tab CR   Yes No   Sig: Take 20 mEq by mouth 2 times a day.   pyridoxine (VITAMIN B-6) 50 MG Tab   Yes No   Sig: Take 50 mg by mouth every morning.   tamsulosin (FLOMAX) 0.4 MG capsule   Yes No   Sig: Take 0.4 mg by mouth every morning.   vitamin D (CHOLECALCIFEROL) 1000 UNIT Tab   Yes No   Sig: Take 1,000 Units by mouth every day.      Facility-Administered Medications: None       Physical Exam  Temp:  [38.9 °C (102 °F)] 38.9 °C (102 °F)  Pulse:  [78-91] 78  Resp:  [14-31] 22  BP:  (85-94)/(48-52) 86/49  SpO2:  [94 %-100 %] 98 %  Blood Pressure : (!) 86/49   Temperature: (!) 38.9 °C (102 °F)   Pulse: 78   Respiration: (!) 22   Pulse Oximetry: 98 %       Physical Exam  Vitals and nursing note reviewed.   Constitutional:       General: He is not in acute distress.     Appearance: He is well-developed. He is not toxic-appearing or diaphoretic.      Comments: Thin and frail appearing    HENT:      Head: Normocephalic and atraumatic.      Right Ear: External ear normal.      Left Ear: External ear normal.      Nose: Nose normal. No congestion or rhinorrhea.      Mouth/Throat:      Pharynx: No oropharyngeal exudate.   Eyes:      General:         Right eye: No discharge.         Left eye: No discharge.   Neck:      Trachea: No tracheal deviation.   Cardiovascular:      Rate and Rhythm: Normal rate and regular rhythm.   Pulmonary:      Effort: Tachypnea present. No respiratory distress.      Breath sounds: No stridor. Decreased breath sounds, wheezing and rales present.   Abdominal:      General: Bowel sounds are normal. There is no distension.      Palpations: Abdomen is soft.   Musculoskeletal:      Cervical back: Normal range of motion and neck supple. No edema or erythema.      Right lower leg: Edema present.      Left lower leg: Edema present.   Lymphadenopathy:      Cervical: No cervical adenopathy.   Skin:     General: Skin is warm and dry.      Findings: No erythema or rash.   Neurological:      Comments: Somnolent, nonverbal    Psychiatric:         Speech: He is noncommunicative.         Laboratory:  Recent Labs     12/07/23 0215   WBC 4.0*   RBC 2.01*   HEMOGLOBIN 7.5*   HEMATOCRIT 23.1*   .9*   MCH 37.3*   MCHC 32.5   RDW 73.7*   PLATELETCT 154*   MPV 10.0     Recent Labs     12/07/23 0215   SODIUM 136   POTASSIUM 3.1*   CHLORIDE 101   CO2 24   GLUCOSE 135*   BUN 8   CREATININE 0.67   CALCIUM 8.8     Recent Labs     12/07/23 0215   ALTSGPT 55*   ASTSGOT 39   ALKPHOSPHAT 126*    TBILIRUBIN 0.8   GLUCOSE 135*         Recent Labs     12/07/23  0215   NTPROBNP 3447*         Recent Labs     12/07/23  0215   TROPONINT 71*       Imaging:  DX-CHEST-PORTABLE (1 VIEW)   Final Result         1.  Interstitial pulmonary parenchymal prominence suggest chronic underlying lung disease, component of interstitial edema and/or infiltrates not excluded.      CT-CTA CHEST PULMONARY ARTERY W/ RECONS    (Results Pending)       X-Ray:  I have personally reviewed the images and compared with prior images.    Assessment/Plan:  Justification for Admission Status  I anticipate this patient will require at least two midnights for appropriate medical management, necessitating inpatient admission because acute respiratory failure with hypoxia    Patient will need a ICU (Adult and Pediatrics) bed on CARDIOLOGY service .  The need is secondary to acute respiratory failure with hypoxia.    * Acute on chronic respiratory failure with hypoxia (HCC)- (present on admission)  Assessment & Plan  Due to COPD exacerbation  Interstitial edema and/or infiltrates noted on chest x-ray  Patient was given broad-spectrum antibiotics in the ER, will continue with Rocephin and azithromycin  Await procalcitonin and blood cultures  Await CTA chest    Shock (HCC)- (present on admission)  Assessment & Plan  While patient does have a fever as well as tachypnea, thus far there is no definitive infection so I am unsure what is causing his shock  Patient will be placed in the ICU and started on Levophed  Wean as able  Await procalcitonin and CTA chest, if these do show definitive infection, likely sepsis but still unable to give significant fluid boluses due to worsening lower extremity edema concern for CHF    Patient is critically ill with life-threatening shock requiring Levophed  Does require close and continuous monitoring in the ICU  If patient was not placed on Levophed, would certainly continue to worsen and risk of cardiopulmonary  arrest  Critical care time, not including procedures, no overlap: 38 minutes    COPD exacerbation (HCC)- (present on admission)  Assessment & Plan  Patient has significantly decreased breath sounds with some mild expiratory wheeze  Potentially secondary to pneumonia, procalcitonin and CTA chest pending  Possibly secondary to pulmonary edema  Start IV steroids  Start respiratory care per protocol  Continuous pulse ox monitoring    Lower extremity edema- (present on admission)  Assessment & Plan  Worsening lower extremity edema  Associated with chest pain and a mild elevation in troponin  Obtain echocardiogram  Since patient does have borderline to low blood pressure at this point requiring pressors, I will not start Lasix however if patient does have improvement in his blood pressure with edema on CTA chest, will give a one-time dose of Lasix to see how his blood pressure tolerates it, unless there is a significant infection found    SIRS (systemic inflammatory response syndrome) (HCC)- (present on admission)  Assessment & Plan  SIRS criteria identified on my evaluation include:  Fever, with temperature greater than 100.9 deg F and Tachypnea, with respirations greater than 20 per minute  Unable to determine if it is sepsis at this time, await procalcitonin and CTA of the chest  I do believe his tachypnea secondary to his COPD exacerbation and hypoxia  Does have a fever of 102, white blood cell count is at his baseline  Patient will be on Rocephin and azithromycin      Elevated troponin- (present on admission)  Assessment & Plan  Mild elevation in his troponin, normal kidney function  Patient did have chest pain initially, continue to trend troponin  Monitor patient on telemetry  Associated with worsening lower extremity edema  Obtain echocardiogram    Hypokalemia- (present on admission)  Assessment & Plan  Start oral potassium  Repeat CMP in the morning    Hypothyroid- (present on admission)  Assessment &  Plan  Patient was recently diagnosed with hypothyroidism, started on Synthroid  Apparently too high of a dose, for now I will hold his Synthroid due to his TSH now being low and his free T4 being elevated    Chronic anemia- (present on admission)  Assessment & Plan  Significant anemia with slight worsening from his apparent baseline  He is on iron supplementation which I will continue however it is a macrocytic anemia  Obtain vitamin B12  Transfuse as needed  No sign of gross bleeding  Repeat CBC in the morning    Pancytopenia (HCC)- (present on admission)  Assessment & Plan  Chronic with no significant change  White blood cell count is near his baseline however there is a left shift but looking at his labs he has a chronic left shift  Repeat CBC in the morning        VTE prophylaxis: SCDs/TEDs

## 2023-12-07 NOTE — DISCHARGE PLANNING
Case Management Discharge Planning    Admission Date: 12/7/2023  GMLOS: 3.5  ALOS: 0    6-Clicks ADL Score: 20  6-Clicks Mobility Score: 19      Anticipated Discharge Dispo: Discharge Disposition: Discharged to home/self care (01)    DME Needed: No    Action(s) Taken: DC Assessment Complete (See below)    Escalations Completed: None    Medically Clear: No    Barriers to Discharge: Medical clearance    Course of Action  **1210  LSW spoke to patient at bedside. Confirmed facesheet information. Patient reports he lives in [place] with his wife, Cristina. Patient reports full independence with ADLs. States he does not use supportive equipment aside from oxygen. Patient reports his baseline for oxygen is 2L and [facility] Patient shared he does have [facility], but could not recall name. No needs currently identified.    Care Transition Team Assessment    Information Source  Orientation Level: Oriented X4  Information Given By: Patient  Who is responsible for making decisions for patient? : Patient    Readmission Evaluation  Is this a readmission?: Yes - unplanned readmission    Elopement Risk  Legal Hold: No  Ambulatory or Self Mobile in Wheelchair: No-Not an Elopement Risk  Disoriented: No  Psychiatric Symptoms: None  History of Wandering: No  Elopement this Admit: No  Vocalizing Wanting to Leave: No  Displays Behaviors, Body Language Wanting to Leave: No-Not at Risk for Elopement  Elopement Risk: Not at Risk for Elopement    Interdisciplinary Discharge Planning  Lives with - Patient's Self Care Capacity: Significant Other  Patient or legal guardian wants to designate a caregiver: No  Support Systems: Spouse / Significant Other  Housing / Facility: [address]  Durable Medical Equipment: Home Oxygen    Discharge Preparedness  What is your plan after discharge?: Home with help  What are your discharge supports?: Spouse  Prior Functional Level: Independent with  Activities of Daily Living, Independent with Medication Management  Difficulity with ADLs: None  Difficulity with IADLs: None    Functional Assesment  Prior Functional Level: Independent with Activities of Daily Living, Independent with Medication Management    Finances  Financial Barriers to Discharge: No  Prescription Coverage: Yes    Vision / Hearing Impairment  Vision Impairment : Yes  Right Eye Vision: Impaired (blurry)  Left Eye Vision: Impaired (blurry)  Hearing Impairment : No    Advance Directive  Advance Directive?: None    Domestic Abuse  Have you ever been the victim of abuse or violence?: No  Physical Abuse or Sexual Abuse: No  Verbal Abuse or Emotional Abuse: No  Possible Abuse/Neglect Reported to:: Not Applicable    Discharge Risks or Barriers  Discharge risks or barriers?: No    Anticipated Discharge Information  Discharge Disposition: Discharged to home/self care (01)

## 2023-12-07 NOTE — ASSESSMENT & PLAN NOTE
Worsening lower extremity edema  Associated with chest pain and a mild elevation in troponin  Obtain echocardiogram  Since patient does have borderline to low blood pressure at this point requiring pressors, I will not start Lasix however if patient does have improvement in his blood pressure with edema on CTA chest, will give a one-time dose of Lasix to see how his blood pressure tolerates it, unless there is a significant infection found

## 2023-12-07 NOTE — ASSESSMENT & PLAN NOTE
SIRS criteria identified on my evaluation include:  Fever, with temperature greater than 100.9 deg F and Tachypnea, with respirations greater than 20 per minute  Unable to determine if it is sepsis at this time, await procalcitonin and CTA of the chest  I do believe his tachypnea secondary to his COPD exacerbation and hypoxia  Does have a fever of 102, white blood cell count is at his baseline  Patient will be on Rocephin and azithromycin

## 2023-12-08 LAB
ALBUMIN SERPL BCP-MCNC: 2.7 G/DL (ref 3.2–4.9)
ALBUMIN/GLOB SERPL: 1 G/DL
ALP SERPL-CCNC: 99 U/L (ref 30–99)
ALT SERPL-CCNC: 44 U/L (ref 2–50)
ANION GAP SERPL CALC-SCNC: 10 MMOL/L (ref 7–16)
APTT PPP: 30.4 SEC (ref 24.7–36)
AST SERPL-CCNC: 36 U/L (ref 12–45)
BILIRUB CONJ SERPL-MCNC: <0.2 MG/DL (ref 0.1–0.5)
BILIRUB INDIRECT SERPL-MCNC: NORMAL MG/DL (ref 0–1)
BILIRUB SERPL-MCNC: 0.3 MG/DL (ref 0.1–1.5)
BUN SERPL-MCNC: 13 MG/DL (ref 8–22)
CALCIUM ALBUM COR SERPL-MCNC: 9.2 MG/DL (ref 8.5–10.5)
CALCIUM SERPL-MCNC: 8.2 MG/DL (ref 8.4–10.2)
CHLORIDE SERPL-SCNC: 105 MMOL/L (ref 96–112)
CO2 SERPL-SCNC: 24 MMOL/L (ref 20–33)
CORTIS SERPL-MCNC: 3.3 UG/DL (ref 0–23)
CREAT SERPL-MCNC: 0.58 MG/DL (ref 0.5–1.4)
EKG IMPRESSION: NORMAL
ERYTHROCYTE [DISTWIDTH] IN BLOOD BY AUTOMATED COUNT: 71.3 FL (ref 35.9–50)
ERYTHROCYTE [DISTWIDTH] IN BLOOD BY AUTOMATED COUNT: 85.1 FL (ref 35.9–50)
FERRITIN SERPL-MCNC: 1796 NG/ML (ref 22–322)
GFR SERPLBLD CREATININE-BSD FMLA CKD-EPI: 101 ML/MIN/1.73 M 2
GLOBULIN SER CALC-MCNC: 2.6 G/DL (ref 1.9–3.5)
GLUCOSE BLD STRIP.AUTO-MCNC: 101 MG/DL (ref 65–99)
GLUCOSE BLD STRIP.AUTO-MCNC: 104 MG/DL (ref 65–99)
GLUCOSE SERPL-MCNC: 108 MG/DL (ref 65–99)
HCT VFR BLD AUTO: 21.3 % (ref 42–52)
HCT VFR BLD AUTO: 27.3 % (ref 42–52)
HEMOCCULT STL QL: NEGATIVE
HGB BLD-MCNC: 6.8 G/DL (ref 14–18)
HGB BLD-MCNC: 8.8 G/DL (ref 14–18)
HGB RETIC QN AUTO: 27.1 PG/CELL (ref 29–35)
IMM RETICS NFR: 25.4 % (ref 2.6–16.1)
INR PPP: 0.98 (ref 0.87–1.13)
IRON SATN MFR SERPL: 86 % (ref 15–55)
IRON SERPL-MCNC: 144 UG/DL (ref 50–180)
LIPASE SERPL-CCNC: 12 U/L (ref 11–82)
MCH RBC QN AUTO: 35.6 PG (ref 27–33)
MCH RBC QN AUTO: 36.8 PG (ref 27–33)
MCHC RBC AUTO-ENTMCNC: 31.9 G/DL (ref 32.3–36.5)
MCHC RBC AUTO-ENTMCNC: 32.2 G/DL (ref 32.3–36.5)
MCV RBC AUTO: 110.5 FL (ref 81.4–97.8)
MCV RBC AUTO: 115.1 FL (ref 81.4–97.8)
PLATELET # BLD AUTO: 149 K/UL (ref 164–446)
PLATELET # BLD AUTO: 160 K/UL (ref 164–446)
PMV BLD AUTO: 10 FL (ref 9–12.9)
PMV BLD AUTO: 10.1 FL (ref 9–12.9)
POTASSIUM SERPL-SCNC: 4.6 MMOL/L (ref 3.6–5.5)
PROT SERPL-MCNC: 5.3 G/DL (ref 6–8.2)
PROTHROMBIN TIME: 13.4 SEC (ref 12–14.6)
RBC # BLD AUTO: 1.85 M/UL (ref 4.7–6.1)
RBC # BLD AUTO: 2.47 M/UL (ref 4.7–6.1)
RETICS # AUTO: 0.05 M/UL (ref 0.04–0.12)
RETICS/RBC NFR: 2.2 % (ref 0.8–2.6)
SODIUM SERPL-SCNC: 139 MMOL/L (ref 135–145)
TIBC SERPL-MCNC: 167 UG/DL (ref 250–450)
TROPONIN T SERPL-MCNC: 76 NG/L (ref 6–19)
TROPONIN T SERPL-MCNC: 85 NG/L (ref 6–19)
TROPONIN T SERPL-MCNC: 88 NG/L (ref 6–19)
UFH PPP CHRO-ACNC: 0.14 IU/ML
UFH PPP CHRO-ACNC: <0.1 IU/ML
UIBC SERPL-MCNC: 23 UG/DL (ref 110–370)
WBC # BLD AUTO: 3.4 K/UL (ref 4.8–10.8)
WBC # BLD AUTO: 4 K/UL (ref 4.8–10.8)

## 2023-12-08 PROCEDURE — 82962 GLUCOSE BLOOD TEST: CPT

## 2023-12-08 PROCEDURE — 83550 IRON BINDING TEST: CPT

## 2023-12-08 PROCEDURE — 700102 HCHG RX REV CODE 250 W/ 637 OVERRIDE(OP): Performed by: INTERNAL MEDICINE

## 2023-12-08 PROCEDURE — 82272 OCCULT BLD FECES 1-3 TESTS: CPT

## 2023-12-08 PROCEDURE — 83690 ASSAY OF LIPASE: CPT

## 2023-12-08 PROCEDURE — A9270 NON-COVERED ITEM OR SERVICE: HCPCS | Performed by: INTERNAL MEDICINE

## 2023-12-08 PROCEDURE — 85730 THROMBOPLASTIN TIME PARTIAL: CPT

## 2023-12-08 PROCEDURE — 86923 COMPATIBILITY TEST ELECTRIC: CPT

## 2023-12-08 PROCEDURE — 700111 HCHG RX REV CODE 636 W/ 250 OVERRIDE (IP): Mod: JZ | Performed by: STUDENT IN AN ORGANIZED HEALTH CARE EDUCATION/TRAINING PROGRAM

## 2023-12-08 PROCEDURE — 770020 HCHG ROOM/CARE - TELE (206)

## 2023-12-08 PROCEDURE — 93010 ELECTROCARDIOGRAM REPORT: CPT | Performed by: INTERNAL MEDICINE

## 2023-12-08 PROCEDURE — 36415 COLL VENOUS BLD VENIPUNCTURE: CPT

## 2023-12-08 PROCEDURE — 36430 TRANSFUSION BLD/BLD COMPNT: CPT

## 2023-12-08 PROCEDURE — 82728 ASSAY OF FERRITIN: CPT

## 2023-12-08 PROCEDURE — P9016 RBC LEUKOCYTES REDUCED: HCPCS

## 2023-12-08 PROCEDURE — 82533 TOTAL CORTISOL: CPT

## 2023-12-08 PROCEDURE — 85610 PROTHROMBIN TIME: CPT

## 2023-12-08 PROCEDURE — 83540 ASSAY OF IRON: CPT

## 2023-12-08 PROCEDURE — 85046 RETICYTE/HGB CONCENTRATE: CPT

## 2023-12-08 PROCEDURE — 80053 COMPREHEN METABOLIC PANEL: CPT

## 2023-12-08 PROCEDURE — 99233 SBSQ HOSP IP/OBS HIGH 50: CPT | Performed by: STUDENT IN AN ORGANIZED HEALTH CARE EDUCATION/TRAINING PROGRAM

## 2023-12-08 PROCEDURE — 82248 BILIRUBIN DIRECT: CPT

## 2023-12-08 PROCEDURE — 85520 HEPARIN ASSAY: CPT

## 2023-12-08 PROCEDURE — 84484 ASSAY OF TROPONIN QUANT: CPT | Mod: 91

## 2023-12-08 PROCEDURE — 85027 COMPLETE CBC AUTOMATED: CPT

## 2023-12-08 RX ORDER — HEPARIN SODIUM 1000 [USP'U]/ML
60 INJECTION, SOLUTION INTRAVENOUS; SUBCUTANEOUS ONCE
Status: COMPLETED | OUTPATIENT
Start: 2023-12-08 | End: 2023-12-08

## 2023-12-08 RX ORDER — HEPARIN SODIUM 1000 [USP'U]/ML
30 INJECTION, SOLUTION INTRAVENOUS; SUBCUTANEOUS PRN
Status: DISCONTINUED | OUTPATIENT
Start: 2023-12-08 | End: 2023-12-08

## 2023-12-08 RX ORDER — NITROGLYCERIN 0.4 MG/1
0.4 TABLET SUBLINGUAL
Status: DISCONTINUED | OUTPATIENT
Start: 2023-12-08 | End: 2023-12-09 | Stop reason: HOSPADM

## 2023-12-08 RX ORDER — HEPARIN SODIUM 5000 [USP'U]/100ML
0-30 INJECTION, SOLUTION INTRAVENOUS CONTINUOUS
Status: DISCONTINUED | OUTPATIENT
Start: 2023-12-08 | End: 2023-12-08

## 2023-12-08 RX ORDER — MORPHINE SULFATE 4 MG/ML
2 INJECTION INTRAVENOUS
Status: DISCONTINUED | OUTPATIENT
Start: 2023-12-08 | End: 2023-12-08

## 2023-12-08 RX ORDER — HEPARIN SODIUM 5000 [USP'U]/100ML
0-30 INJECTION, SOLUTION INTRAVENOUS CONTINUOUS
Status: DISCONTINUED | OUTPATIENT
Start: 2023-12-08 | End: 2023-12-09

## 2023-12-08 RX ORDER — HEPARIN SODIUM 1000 [USP'U]/ML
30 INJECTION, SOLUTION INTRAVENOUS; SUBCUTANEOUS PRN
Status: DISCONTINUED | OUTPATIENT
Start: 2023-12-08 | End: 2023-12-09

## 2023-12-08 RX ADMIN — DOCUSATE SODIUM 50MG AND SENNOSIDES 8.6MG 2 TABLET: 8.6; 5 TABLET, FILM COATED ORAL at 05:20

## 2023-12-08 RX ADMIN — TAMSULOSIN HYDROCHLORIDE 0.4 MG: 0.4 CAPSULE ORAL at 05:20

## 2023-12-08 RX ADMIN — HEPARIN SODIUM 12 UNITS/KG/HR: 5000 INJECTION, SOLUTION INTRAVENOUS at 15:00

## 2023-12-08 RX ADMIN — ATORVASTATIN CALCIUM 80 MG: 40 TABLET, FILM COATED ORAL at 10:56

## 2023-12-08 RX ADMIN — AZITHROMYCIN DIHYDRATE 500 MG: 250 TABLET ORAL at 05:20

## 2023-12-08 RX ADMIN — LEVOTHYROXINE SODIUM 150 MCG: 0.07 TABLET ORAL at 05:20

## 2023-12-08 RX ADMIN — HEPARIN SODIUM 3400 UNITS: 1000 INJECTION, SOLUTION INTRAVENOUS; SUBCUTANEOUS at 14:56

## 2023-12-08 RX ADMIN — OXYBUTYNIN CHLORIDE 5 MG: 5 TABLET ORAL at 20:58

## 2023-12-08 RX ADMIN — ASPIRIN 81 MG 81 MG: 81 TABLET ORAL at 05:20

## 2023-12-08 RX ADMIN — HEPARIN SODIUM 1700 UNITS: 1000 INJECTION, SOLUTION INTRAVENOUS; SUBCUTANEOUS at 23:40

## 2023-12-08 RX ADMIN — NITROGLYCERIN 0.4 MG: 0.4 TABLET, ORALLY DISINTEGRATING SUBLINGUAL at 01:52

## 2023-12-08 RX ADMIN — HEPARIN SODIUM 1700 UNITS: 1000 INJECTION, SOLUTION INTRAVENOUS; SUBCUTANEOUS at 03:54

## 2023-12-08 ASSESSMENT — PAIN DESCRIPTION - PAIN TYPE
TYPE: ACUTE PAIN

## 2023-12-08 ASSESSMENT — ENCOUNTER SYMPTOMS
PSYCHIATRIC NEGATIVE: 1
CONSTITUTIONAL NEGATIVE: 1
NEUROLOGICAL NEGATIVE: 1
MUSCULOSKELETAL NEGATIVE: 1
SHORTNESS OF BREATH: 1
EYES NEGATIVE: 1
GASTROINTESTINAL NEGATIVE: 1

## 2023-12-08 NOTE — CARE PLAN
The patient is Watcher - Medium risk of patient condition declining or worsening    Shift Goals  Clinical Goals: MAP >65, SBP >90, spo2 >92%  Patient Goals: Sleep  Family Goals: TARA    Patient maintained spo2 >92% on 2L nasal canula.     Progress made toward(s) clinical / shift goals:    Problem: Pain - Standard  Goal: Alleviation of pain or a reduction in pain to the patient’s comfort goal  Outcome: Progressing  Patient's chest pain relieved with sublingual nitro.       Patient is not progressing towards the following goals:

## 2023-12-08 NOTE — PROGRESS NOTES
Hospital Medicine Daily Progress Note    Date of Service  12/8/2023    Chief Complaint  King Alegria is a 78 y.o. male admitted 12/7/2023 with CP    Hospital Course  78 y.o. 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  Patient requiring short-term course of Levophed and was ultimately titrated off.  Troponins initially in the 80s and trending down slowly.  He was started on heparin drip as well as aspirin and high-dose statin.  Case was discussed with Cardiology.    Echocardiogram with normal left ventricular systolic function, ejection fraction 60%, normal RV size and systolic function, mild mitral annular calcification and mild to moderate aortic insufficiency.    Interval Problem Update  Evaluated at bedside  No acute distress this morning, sitting up to chair and happy  Reports substernal pleuritic type chest pain yesterday for around 1 hour which resolved on its own  Vital signs with SBP in the 90-100s/50s  On 2 L nasal cannula  Troponins 50-80  Hemoglobin 8  PRBC transfusion overnight (history of aplastic anemia and patient due for weekly transfusion) no signs of GI bleed  Continue heparin drip for now  Continue aspirin and statin  Cardiac monitoring  Up to chair for all meals  Titrate oxygen as tolerable  Labs on a.m.    I have discussed this patient's plan of care and discharge plan at IDT rounds today with Case Management, Nursing, Nursing leadership, and other members of the IDT team.    Consultants/Specialty  ICU med  Cardiology    Code Status  Full Code    Disposition  The patient is not medically cleared  for discharge to home or a post-acute facility.  Anticipate discharge to: home with close outpatient follow-up    I have placed the appropriate orders for post-discharge needs.    Review of Systems  Review of Systems   Constitutional: Negative.    HENT: Negative.     Eyes: Negative.    Respiratory:  Positive for shortness of breath.    Cardiovascular:  Positive for chest pain.   Gastrointestinal: Negative.    Genitourinary: Negative.    Musculoskeletal: Negative.    Skin: Negative.    Neurological: Negative.    Endo/Heme/Allergies: Negative.    Psychiatric/Behavioral: Negative.          Physical Exam  Temp:  [36.3 °C (97.4 °F)-36.8 °C (98.3 °F)] 36.3 °C (97.4 °F)  Pulse:  [57-83] 68  Resp:  [17-51] 20  BP: ()/(46-75) 90/52  SpO2:  [92 %-100 %] 100 %    Physical Exam  Constitutional:       General: He is not in acute distress.     Appearance: Normal appearance.   HENT:      Head: Normocephalic and atraumatic.      Nose: Nose normal. No congestion.      Mouth/Throat:      Mouth: Mucous membranes are moist.   Eyes:      Extraocular Movements: Extraocular movements intact.      Pupils: Pupils are equal, round, and reactive to light.   Cardiovascular:      Rate and Rhythm: Normal rate and regular rhythm.      Pulses: Normal pulses.      Heart sounds: Normal heart sounds.   Pulmonary:      Effort: Pulmonary effort is normal.      Breath sounds: Normal breath sounds.   Abdominal:      General: Bowel sounds are normal.      Palpations: Abdomen is soft.      Tenderness: There is no abdominal tenderness.   Musculoskeletal:         General: No swelling. Normal range of motion.      Cervical back: Normal range of motion and neck supple.   Skin:     General: Skin is warm.      Coloration: Skin is not jaundiced.   Neurological:      General: No focal deficit present.      Mental Status: He is alert and oriented to person, place, and time. Mental status is at baseline.      Cranial Nerves: No cranial nerve deficit.    Psychiatric:         Mood and Affect: Mood normal.         Behavior: Behavior normal.         Thought Content: Thought content normal.         Judgment: Judgment normal.         Fluids    Intake/Output Summary (Last 24 hours) at 12/8/2023 1519  Last data filed at 12/8/2023 1200  Gross per 24 hour   Intake 849.1 ml   Output 1075 ml   Net -225.9 ml       Laboratory  Recent Labs     12/07/23  0215 12/08/23  0345 12/08/23  1000   WBC 4.0* 3.4* 4.0*   RBC 2.01* 1.85* 2.47*   HEMOGLOBIN 7.5* 6.8* 8.8*   HEMATOCRIT 23.1* 21.3* 27.3*   .9* 115.1* 110.5*   MCH 37.3* 36.8* 35.6*   MCHC 32.5 31.9* 32.2*   RDW 73.7* 71.3* 85.1*   PLATELETCT 154* 160* 149*   MPV 10.0 10.1 10.0     Recent Labs     12/07/23  0215 12/08/23  0345   SODIUM 136 139   POTASSIUM 3.1* 4.6   CHLORIDE 101 105   CO2 24 24   GLUCOSE 135* 108*   BUN 8 13   CREATININE 0.67 0.58   CALCIUM 8.8 8.2*     Recent Labs     12/07/23  0945 12/08/23  1452   APTT 34.1 30.4   INR 1.17* 0.98               Imaging  US-RUQ   Final Result      1.  No cholelithiasis or biliary dilatation.   2.  Incidental simple right renal cyst. No follow-up imaging is indicated.      EC-ECHOCARDIOGRAM COMPLETE W/ CONT   Final Result      CT-CTA CHEST PULMONARY ARTERY W/ RECONS   Final Result         1.  No pulmonary embolus appreciated.   2.  Extensive emphysema   3.  Hazy groundglass pulmonary opacities suggesting pulmonary edema.   4.  Atherosclerosis and atherosclerotic coronary artery disease.      DX-CHEST-PORTABLE (1 VIEW)   Final Result         1.  Interstitial pulmonary parenchymal prominence suggest chronic underlying lung disease, component of interstitial edema and/or infiltrates not excluded.           Assessment/Plan  * Acute on chronic respiratory failure with hypoxia (HCC)- (present on admission)  Assessment & Plan  Due to COPD exacerbation  Interstitial edema and/or infiltrates noted on chest x-ray  Patient was given broad-spectrum antibiotics in the ER, will  continue with Rocephin and azithromycin  Await procalcitonin and blood cultures  Await CTA chest    SIRS (systemic inflammatory response syndrome) (HCC)- (present on admission)  Assessment & Plan  SIRS criteria identified on my evaluation include:  Fever, with temperature greater than 100.9 deg F and Tachypnea, with respirations greater than 20 per minute  Unable to determine if it is sepsis at this time, await procalcitonin and CTA of the chest  I do believe his tachypnea secondary to his COPD exacerbation and hypoxia  Does have a fever of 102, white blood cell count is at his baseline  Patient will be on Rocephin and azithromycin      COPD exacerbation (HCC)- (present on admission)  Assessment & Plan  Patient has significantly decreased breath sounds with some mild expiratory wheeze  Potentially secondary to pneumonia, procalcitonin and CTA chest pending  Possibly secondary to pulmonary edema  Start IV steroids  Start respiratory care per protocol  Continuous pulse ox monitoring    Shock ([facility])- (present on admission)  Assessment & Plan  While patient does have a fever as well as tachypnea, thus far there is no definitive infection so I am unsure what is causing his shock  Patient will be placed in the ICU and started on Levophed  Wean as able  Await procalcitonin and CTA chest, if these do show definitive infection, likely sepsis but still unable to give significant fluid boluses due to worsening lower extremity edema concern for CHF    Patient is critically ill with life-threatening shock requiring Levophed  Does require close and continuous monitoring in the ICU  If patient was not placed on Levophed, would certainly continue to worsen and risk of cardiopulmonary arrest  Critical care time, not including procedures, no overlap: 38 minutes    Elevated troponin- (present on admission)  Assessment & Plan  Mild elevation in his troponin, normal kidney function  Patient did have chest pain initially, continue to  trend troponin  Monitor patient on telemetry  Associated with worsening lower extremity edema  Obtain echocardiogram    Lower extremity edema- (present on admission)  Assessment & Plan  Worsening lower extremity edema  Associated with chest pain and a mild elevation in troponin  Obtain echocardiogram  Since patient does have borderline to low blood pressure at this point requiring pressors, I will not start Lasix however if patient does have improvement in his blood pressure with edema on CTA chest, will give a one-time dose of Lasix to see how his blood pressure tolerates it, unless there is a significant infection found    Hypokalemia- (present on admission)  Assessment & Plan  Start oral potassium  Repeat CMP in the morning    Hypothyroid- (present on admission)  Assessment & Plan  Patient was recently diagnosed with hypothyroidism, started on Synthroid  Apparently too high of a dose, for now I will hold his Synthroid due to his TSH now being low and his free T4 being elevated    Chronic anemia- (present on admission)  Assessment & Plan  Significant anemia with slight worsening from his apparent baseline  He is on iron supplementation which I will continue however it is a macrocytic anemia  Obtain vitamin B12  Transfuse as needed  No sign of gross bleeding  Repeat CBC in the morning    Pancytopenia (HCC)- (present on admission)  Assessment & Plan  Chronic with no significant change  White blood cell count is near his baseline however there is a left shift but looking at his labs he has a chronic left shift  Repeat CBC in the morning         VTE prophylaxis: Heparin drip    I have performed a physical exam and reviewed and updated ROS and Plan today (12/8/2023). In review of yesterday's note (12/7/2023), there are no changes except as documented above.    Greater than 51 minutes spent prepping to see patient (e.g. review of tests) obtaining and/or reviewing separately obtained history. Performing a medically  appropriate examination and/ evaluation.  Counseling and educating the patient/family/caregiver.  Ordering medications, tests, or procedures.  Referring and communicating with other health care professionals.  Documenting clinical information in EPIC.  Independently interpreting results and communicating results to patient/family/caregiver.  Care coordination

## 2023-12-08 NOTE — PROGRESS NOTES
12-hour chart check complete.    Monitor Summary  Rhythm: SR w/ PVC  Rate: 75  Ectopy: pvc  Measurements: 0.16/0.10/0.40

## 2023-12-08 NOTE — CARE PLAN
The patient is Watcher - Medium risk of patient condition declining or worsening    Shift Goals  Clinical Goals: maintain MAP >60 or SBP >90 while weaning off levophed  Patient Goals: rest    Progress made toward(s) clinical / shift goals:    Problem: Knowledge Deficit - COPD  Goal: Patient/significant other demonstrates understanding of disease process, utilization of the Action Plan, medications and discharge instruction  Outcome: Progressing     Problem: Risk for Infection - COPD  Goal: Patient will remain free from signs and symptoms of infection  Outcome: Progressing     Problem: Ineffective Airway Clearance  Goal: Patient will maintain patent airway with clear/clearing breath sounds  Outcome: Progressing     Problem: Impaired Gas Exchange  Goal: Patient will demonstrate improved ventilation and adequate oxygenation and participate in treatment regimen within the level of ability/situation.  Outcome: Progressing       Patient is not progressing towards the following goals:

## 2023-12-08 NOTE — RESPIRATORY CARE
"   COPD EDUCATION by COPD CLINICAL EDUCATOR  12/8/2023 at 9:10 AM by Jocelyne Taylor, RRT     Patient reviewed by COPD education team. Patient does have a history or diagnosis of COPD and is a former smoker.       Patient unable to participate in full program.  A short intervention has been conducted.  A comprehensive packet including information about COPD, types of treatments to manage their disease and safe home Oxygen usage was provided and reviewed with patient at the bedside.      COPD Screen  COPD Risk Screening  Do you have a history of COPD?: No (no PFT's)    COPD Assessment  COPD Clinical Specialists ONLY  COPD Education Initiated: Yes--Short Intervention, Yes--30 Day Readmission ([facility] dx COPD, quit smoking 2013 100 pk yr hx, CT shows emphysema, no maintenance inhalers, has never been told he has COPD, admit chest pain BNP 3447, encourge follow up & PFT w[facility] pt agrees, in ICU for NSTEMI w/relative hypotension.)  Is this a COPD exacerbation patient?: No  DME Company: [employer]  DME Equipment Type: oxygen  Physician Name: [facility]  Pulmonologist Name: [facility]  Referrals Initiated: Yes  Pulmonary Rehab: Declined  Smoking Cessation: N/A  Hospice: N/A  Home Health Care: N/A  Mobile Urgent Care Services: N/A  Geriatric Specialty Group: N/A  Private In-Home Care Agency: N/A  (OP) Pulmonary Function Testing: Yes  Interdisciplinary Rounds: Attendance at Rounds (30 Min)    PFT Results    No results found for: \"PFT\"    Meds to Beds  [facility] provides bedside medication delivery for all eligible patients at discharge.  Would you like to opt out of this program for any reason?: No - Stay Opted In     MY COPD ACTION PLAN     It is recommended that patients and physicians /healthcare providers complete this action plan together. This plan should be discussed at each physician visit and updated as needed.    The green, yellow and red zones show groups of symptoms of COPD. This list of symptoms is not " "comprehensive, and you may experience other symptoms. In the \"Actions\" column, your healthcare provider has recommended actions for you to take based on your symptoms.    Patient Name: King Alegria   YOB: 1945   Last Updated on:     Green Zone:  I am doing well today Actions     Usual activitiy and exercise level   Take daily medications     Usual amounts of cough and phlegm/mucus   Use oxygen as prescribed     Sleep well at night   Continue regular exercise/diet plan     Appetite is good   At all times avoid cigarette smoke, inhaled irritants     Daily Medications (these medications are taken every day):                Yellow Zone:  I am having a bad day or a COPD flare Actions     More breathless than usual   Continue daily medications     I have less energy for my daily activities   Use quick relief inhaler as ordered     Increased or thicker phlegm/mucus   Use oxygen as prescribed     Using quick relief inhaler/nebulizer more often   Get plenty of rest     Swelling of ankles more than usual   Use pursed lip breathing     More coughing than usual   At all times avoid cigarette smoke, inhaled irritants     I feel like I have a \"chest cold\"     Poor sleep and my symptoms woke me up     My appetite is not good     My medicine is not helping      Call provider immediately if symptoms don’t improve     Continue daily medications, add rescue medications:               Medications to be used during a flare up, (as Discussed with Provider):              Red Zone:  I need urgent medical care Actions     Severe shortness of breath even at rest   Call 911 or seek medical care immediately     Not able to do any activity because of breathing      Fever or shaking chills      Feeling confused or very drowsy       Chest pains      Coughing up blood                  "

## 2023-12-08 NOTE — PROGRESS NOTES
12-hour chart check complete.    Monitor Summary  Rhythm: SR  Rate: 63-77  Ectopy: fPVC, fPAC, Trigem, Bigem, Couplet  Measurements: 0.14/0.10/0.38

## 2023-12-08 NOTE — PROGRESS NOTES
Update    Blood pressure improved with 500 cc fluid bolus  /hr ordered for overnight  Trops trending down  Morning labs to include LFTs, lipase as possible fever source (respiratory PCR negative, UA negative, CTA chest negative)  Morning labs to include cortisol level (hypotension)  Will need cardiology follow up.     Downgrade to med-tele    Jaime Wells D.O.

## 2023-12-08 NOTE — CARE PLAN
The patient is Watcher - Medium risk of patient condition declining or worsening    Shift Goals  Clinical Goals: Monitor H&H, monitor chest pain  Patient Goals: Feel better  Family Goals: TARA    Progress made toward(s) clinical / shift goals:    Problem: Knowledge Deficit - Standard  Goal: Patient and family/care givers will demonstrate understanding of plan of care, disease process/condition, diagnostic tests and medications  Outcome: Not Progressing  Note: Monitoring H&H q 8 hrs, xa values for heparin drip.      Problem: Self Care  Goal: Patient will have the ability to perform ADLs independently or with assistance (bathe, groom, dress, toilet and feed)  Outcome: Progressing       Patient is not progressing towards the following goals:      Problem: Knowledge Deficit - Standard  Goal: Patient and family/care givers will demonstrate understanding of plan of care, disease process/condition, diagnostic tests and medications  Outcome: Not Progressing  Note: Monitoring H&H q 8 hrs, xa values for heparin drip.

## 2023-12-09 VITALS
SYSTOLIC BLOOD PRESSURE: 105 MMHG | OXYGEN SATURATION: 96 % | WEIGHT: 123.24 LBS | DIASTOLIC BLOOD PRESSURE: 58 MMHG | BODY MASS INDEX: 19.34 KG/M2 | RESPIRATION RATE: 28 BRPM | TEMPERATURE: 98 F | HEART RATE: 74 BPM | HEIGHT: 67 IN

## 2023-12-09 LAB
BACTERIA UR CULT: NORMAL
SIGNIFICANT IND 70042: NORMAL
SITE SITE: NORMAL
SOURCE SOURCE: NORMAL

## 2023-12-09 PROCEDURE — A9270 NON-COVERED ITEM OR SERVICE: HCPCS | Performed by: INTERNAL MEDICINE

## 2023-12-09 PROCEDURE — 700102 HCHG RX REV CODE 250 W/ 637 OVERRIDE(OP): Performed by: INTERNAL MEDICINE

## 2023-12-09 PROCEDURE — 99239 HOSP IP/OBS DSCHRG MGMT >30: CPT | Performed by: STUDENT IN AN ORGANIZED HEALTH CARE EDUCATION/TRAINING PROGRAM

## 2023-12-09 RX ADMIN — ASPIRIN 81 MG 81 MG: 81 TABLET ORAL at 05:51

## 2023-12-09 RX ADMIN — LEVOTHYROXINE SODIUM 150 MCG: 0.07 TABLET ORAL at 05:52

## 2023-12-09 RX ADMIN — AZITHROMYCIN DIHYDRATE 500 MG: 250 TABLET ORAL at 05:52

## 2023-12-09 RX ADMIN — TAMSULOSIN HYDROCHLORIDE 0.4 MG: 0.4 CAPSULE ORAL at 05:52

## 2023-12-09 ASSESSMENT — PAIN DESCRIPTION - PAIN TYPE
TYPE: ACUTE PAIN
TYPE: ACUTE PAIN

## 2023-12-09 NOTE — CARE PLAN
The patient is Stable - Low risk of patient condition declining or worsening    Shift Goals  Clinical Goals: MAP >60 or SBP >90, O2 >95%  Patient Goals: Go home  Family Goals: TARA    Progress made toward(s) clinical / shift goals:  Patient was able to maintain MAP >60 and SBP  >90, O2 >95% while on 2L NC which is the patient's baseline at home. The patient maintained adequate oral intake and output as well. The patient had no complaints of pain and was wanting to ambulate in their room. The patient tolerated walking well with no decline in O2 saturation. The patient has a dry cough and is able to properly maintain and clear their airway.     Patient is not progressing towards the following goals:

## 2023-12-09 NOTE — DISCHARGE PLANNING
Care Transition Team Final Discharge Disposition    Actual Discharge Information  Discharge Disposition: Discharged to home/self care (01)    Ready for discharge home. Has home o2 no tank at hospital. Patient refuses to wait for o2 tank delivery from [employer] [facility]. Rn and  Notified. Patient stated I will be just fine going home without o2.

## 2023-12-09 NOTE — DIETARY
Nutrition Update:    Day 2 of admit.  King Alegria is a 78 y.o. male with admitting DX of Acute respiratory failure with hypoxia.     Pt is on regular diet with Boost glucose with meals (d/t limited supply of Boost Plus). Pt is etaing % of meals and snacks. Weight is stable. Noted pt wanting to discharge. No wounds and trace edema to R/LLE. Last BM: 12/8.     Problem: Nutritional:  Goal: Achieve adequate nutritional intake  Description: Patient will consume >50% of meals  Outcome: MET    No further nutrition intervention needed, pt is pending discharge. RD will monitor per department policy.

## 2023-12-09 NOTE — PROGRESS NOTES
12-hour chart check complete.    Monitor Summary  Rhythm: SR/ junctional  Rate: 60's-70's  Ectopy: occ PVCs  Measurements: -/0.08/.040

## 2023-12-09 NOTE — PROGRESS NOTES
"Patient refusing all lab draws stating \"I have been a pin cushion my whole life and now I'm done with it\". This RN explained the reason and importance of the labs, specifically with regards to the heparin. Patient also attempted to remove Ivs. Education provided on the reason for IVs. Patient exhibited new confusion about his situation, Aox1-2 now, Aox4 at start of shift. Neuro assessment performed and documented. After education and reorientation, patient continues to refuse.   "

## 2023-12-09 NOTE — PROGRESS NOTES
Patient refusing to let nursing staff assist or monitor him when he gets out of bed to use urinal. Patient also refuses to remain in bed to use the urinal. Patient is SBA. RN outside of door while patient uses urinal monitoring for safety.

## 2023-12-09 NOTE — CARE PLAN
The patient is Watcher - Medium risk of patient condition declining or worsening    Shift Goals  Clinical Goals: q6XA, q6 Troponin  Patient Goals: rest  Family Goals: TARA    Progress made toward(s) clinical / shift goals:    Problem: Risk for Infection - COPD  Goal: Patient will remain free from signs and symptoms of infection  Outcome: Progressing     Problem: Ineffective Airway Clearance  Goal: Patient will maintain patent airway with clear/clearing breath sounds  Outcome: Progressing     Problem: Risk for Aspiration  Goal: Patient's risk for aspiration will be absent or decrease  Outcome: Progressing     Problem: Self Care  Goal: Patient will have the ability to perform ADLs independently or with assistance (bathe, groom, dress, toilet and feed)  Outcome: Progressing     Problem: Fall Risk  Goal: Patient will remain free from falls  Outcome: Progressing     Problem: Pain - Standard  Goal: Alleviation of pain or a reduction in pain to the patient’s comfort goal  Outcome: Progressing       Patient is not progressing towards the following goals:      Problem: Knowledge Deficit - Standard  Goal: Patient and family/care givers will demonstrate understanding of plan of care, disease process/condition, diagnostic tests and medications  Outcome: Not Progressing     Problem: Knowledge Deficit - COPD  Goal: Patient/significant other demonstrates understanding of disease process, utilization of the Action Plan, medications and discharge instruction  Outcome: Not Progressing     Problem: Hemodynamics  Goal: Patient's hemodynamics, fluid balance and neurologic status will be stable or improve  Outcome: Not Progressing

## 2023-12-09 NOTE — DISCHARGE SUMMARY
Discharge Summary    CHIEF COMPLAINT ON ADMISSION  Chief Complaint   Patient presents with    Chest Pain     Patient reports chest pain and shortness of breath, worsening over last week. Patient currently taking antibiotics for eye infection, dc'd from hospital recently        Reason for Admission  EMS     Admission Date  12/7/2023    CODE STATUS  Full Code    HPI & HOSPITAL COURSE  78 y.o. 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  Patient requiring short-term course of Levophed and was ultimately titrated off.  Troponins initially in the 80s and trending down slowly.  He was started on heparin drip as well as aspirin and high-dose statin.  Case was discussed with Cardiology.  Echocardiogram with normal left ventricular systolic function, ejection fraction 60%, normal RV size and systolic function, mild mitral annular calcification and mild to moderate aortic insufficiency.  Patient received PRBC transfusion as his weekly transfusion was due for aplastic anemia.  His chest pain resolved and troponins remain 50 to 80s.  I offered pursuing stress testing and further workup for which patient politely declined and reported that he would prefer to be discharged home and follow-up with his cardiologist in the outpatient setting.  Stable patient with in chronic condition was discharged home on aspirin high-dose statin and instructed to follow-up with his primary care provider and cardiologist next week.  All results and plan of action discussed with the patient for she voiced understanding and agreement with the primary  care team.  Patient was instructed to return to emergency department symptoms were to worsen.    Therefore, he is discharged in good and stable condition to home with close outpatient follow-up.    The patient met 2-midnight criteria for an inpatient stay at the time of discharge.    Discharge Date  12/9/2023    FOLLOW UP ITEMS POST DISCHARGE  Primary care provider follow posthospital discharge care    DISCHARGE DIAGNOSES  Principal Problem:    Acute on chronic respiratory failure with hypoxia (HCC) (POA: Yes)  Active Problems:    Hypothyroid (POA: Yes)    Hypokalemia (POA: Yes)    Lower extremity edema (POA: Yes)    Elevated troponin (POA: Yes)    Shock (HCC) (POA: Yes)    COPD exacerbation (HCC) (POA: Yes)    SIRS (systemic inflammatory response syndrome) (HCC) (POA: Yes)    Pancytopenia (HCC) (Chronic) (POA: Yes)    Chronic anemia (POA: Yes)  Resolved Problems:    * No resolved hospital problems. *      FOLLOW UP  Future Appointments   Date Time Provider Department Center   1/9/2024  8:30 [facility]     No follow-up provider specified.    MEDICATIONS ON DISCHARGE     Medication List        CONTINUE taking these medications        Instructions   ascorbic acid 500 MG tablet  Commonly known as: Vitamin C   Take 500 mg by mouth every morning.  Dose: 500 mg     aspirin EC 81 MG Tbec  Commonly known as: Ecotrin   Take 81 mg by mouth at bedtime.  Dose: 81 mg     atenolol 25 MG Tabs  Commonly known as: Tenormin   Take 25 mg by mouth every morning.  Dose: 25 mg     atorvastatin 80 MG tablet  Commonly known as: Lipitor   Take 80 mg by mouth every evening.  Dose: 80 mg     cetirizine 10 MG Tabs  Commonly known as: ZyrTEC   Take 10 mg by mouth every morning.  Dose: 10 mg     docusate calcium 240 MG Caps  Commonly known as: Surfak   Take 240 mg by mouth at bedtime.  Dose: 240 mg     ferrous sulfate 325 (65 Fe) MG tablet   Take 325 mg by mouth at bedtime.  Dose: 325 mg     isosorbide  mononitrate SR 60 MG Tb24  Commonly known as: Imdur   Take 60 mg by mouth every morning.  Dose: 60 mg     levothyroxine 150 MCG Tabs  Commonly known as: Synthroid   Take 1 Tablet by mouth every morning on an empty stomach.  Dose: 150 mcg     oxybutynin 5 MG Tabs  Commonly known as: Ditropan   Take 5 mg by mouth at bedtime.  Dose: 5 mg     potassium chloride SA 20 MEQ Tbcr  Commonly known as: Kdur   Take 20 mEq by mouth 2 times a day.  Dose: 20 mEq     pyridoxine 50 MG Tabs  Commonly known as: Vitamin B-6   Take 50 mg by mouth every morning.  Dose: 50 mg     tamsulosin 0.4 MG capsule  Commonly known as: Flomax   Take 0.4 mg by mouth every morning.  Dose: 0.4 mg     vitamin B-12 1000 MCG Tabs   Take 1,000 mcg by mouth every morning.  Dose: 1,000 mcg     vitamin D 1000 Unit (25 mcg) Tabs  Commonly known as: cholecalciferol   Take 1,000 Units by mouth every day.  Dose: 1,000 Units              Allergies  No Known Allergies    DIET  Orders Placed This Encounter   Procedures    Diet Order Diet: Regular     Standing Status:   Standing     Number of Occurrences:   1     Order Specific Question:   Diet:     Answer:   Regular [1]       ACTIVITY  As tolerated.  Weight bearing as tolerated    CONSULTATIONS  Critical care medicine  Cardiology    PROCEDURES  None    LABORATORY  Lab Results   Component Value Date    SODIUM 139 12/08/2023    POTASSIUM 4.6 12/08/2023    CHLORIDE 105 12/08/2023    CO2 24 12/08/2023    GLUCOSE 108 (H) 12/08/2023    BUN 13 12/08/2023    CREATININE 0.58 12/08/2023        Lab Results   Component Value Date    WBC 4.0 (L) 12/08/2023    HEMOGLOBIN 8.8 (L) 12/08/2023    HEMATOCRIT 27.3 (L) 12/08/2023    PLATELETCT 149 (L) 12/08/2023        Total time of the discharge process exceeds 32 minutes.

## 2023-12-09 NOTE — PROGRESS NOTES
Pt is being discharged. Discharge education has been provided to pt and pt verbalized understanding. The importance of following up with his cardiologist and wearing his oxygen at home at all times was stressed to the patient, patient verbalized that he understood.

## 2023-12-09 NOTE — PROGRESS NOTES
Upon receiving report from night shift, pt is refusing all cardiac and SPO2 monitoring. Pt refused AM labs as well and is pacing around the room demanding to leave. Pt is oriented. Dr. Connolly notified. Tele-monitoring and heparin gtt discontinued. Labs discontinued. Dr. Connolly will be up soon to see patient.

## 2023-12-12 LAB
BACTERIA BLD CULT: NORMAL
BACTERIA BLD CULT: NORMAL
SIGNIFICANT IND 70042: NORMAL
SIGNIFICANT IND 70042: NORMAL
SITE SITE: NORMAL
SITE SITE: NORMAL
SOURCE SOURCE: NORMAL
SOURCE SOURCE: NORMAL

## 2023-12-27 ENCOUNTER — APPOINTMENT (OUTPATIENT)
Dept: RADIOLOGY | Facility: MEDICAL CENTER | Age: 78
DRG: 602 | End: 2023-12-27
Attending: EMERGENCY MEDICINE
Payer: COMMERCIAL

## 2023-12-27 ENCOUNTER — HOSPITAL ENCOUNTER (INPATIENT)
Facility: MEDICAL CENTER | Age: 78
LOS: 3 days | DRG: 602 | End: 2023-12-30
Attending: EMERGENCY MEDICINE | Admitting: STUDENT IN AN ORGANIZED HEALTH CARE EDUCATION/TRAINING PROGRAM
Payer: COMMERCIAL

## 2023-12-27 DIAGNOSIS — E07.9 THYROID ORBITOPATHY: ICD-10-CM

## 2023-12-27 DIAGNOSIS — H05.89 THYROID ORBITOPATHY: ICD-10-CM

## 2023-12-27 DIAGNOSIS — L03.213 PRESEPTAL CELLULITIS: ICD-10-CM

## 2023-12-27 LAB
ALBUMIN SERPL BCP-MCNC: 3.4 G/DL (ref 3.2–4.9)
ALBUMIN/GLOB SERPL: 0.8 G/DL
ALP SERPL-CCNC: 85 U/L (ref 30–99)
ALT SERPL-CCNC: 18 U/L (ref 2–50)
ANION GAP SERPL CALC-SCNC: 9 MMOL/L (ref 7–16)
ANISOCYTOSIS BLD QL SMEAR: ABNORMAL
AST SERPL-CCNC: 19 U/L (ref 12–45)
BASOPHILS # BLD AUTO: 0.2 % (ref 0–1.8)
BASOPHILS # BLD: 0.01 K/UL (ref 0–0.12)
BILIRUB SERPL-MCNC: 0.5 MG/DL (ref 0.1–1.5)
BUN SERPL-MCNC: 10 MG/DL (ref 8–22)
CALCIUM ALBUM COR SERPL-MCNC: 10.2 MG/DL (ref 8.5–10.5)
CALCIUM SERPL-MCNC: 9.7 MG/DL (ref 8.5–10.5)
CHLORIDE SERPL-SCNC: 102 MMOL/L (ref 96–112)
CO2 SERPL-SCNC: 25 MMOL/L (ref 20–33)
COMMENT 1642: NORMAL
CREAT SERPL-MCNC: 0.66 MG/DL (ref 0.5–1.4)
EOSINOPHIL # BLD AUTO: 0.03 K/UL (ref 0–0.51)
EOSINOPHIL NFR BLD: 0.6 % (ref 0–6.9)
ERYTHROCYTE [DISTWIDTH] IN BLOOD BY AUTOMATED COUNT: 76.7 FL (ref 35.9–50)
GFR SERPLBLD CREATININE-BSD FMLA CKD-EPI: 97 ML/MIN/1.73 M 2
GLOBULIN SER CALC-MCNC: 4.4 G/DL (ref 1.9–3.5)
GLUCOSE SERPL-MCNC: 100 MG/DL (ref 65–99)
HCT VFR BLD AUTO: 26.7 % (ref 42–52)
HGB BLD-MCNC: 8.8 G/DL (ref 14–18)
IMM GRANULOCYTES # BLD AUTO: 0.06 K/UL (ref 0–0.11)
IMM GRANULOCYTES NFR BLD AUTO: 1.3 % (ref 0–0.9)
LYMPHOCYTES # BLD AUTO: 0.49 K/UL (ref 1–4.8)
LYMPHOCYTES NFR BLD: 10.3 % (ref 22–41)
MACROCYTES BLD QL SMEAR: ABNORMAL
MCH RBC QN AUTO: 35.5 PG (ref 27–33)
MCHC RBC AUTO-ENTMCNC: 33 G/DL (ref 32.3–36.5)
MCV RBC AUTO: 107.7 FL (ref 81.4–97.8)
MONOCYTES # BLD AUTO: 0.46 K/UL (ref 0–0.85)
MONOCYTES NFR BLD AUTO: 9.6 % (ref 0–13.4)
MORPHOLOGY BLD-IMP: NORMAL
NEUTROPHILS # BLD AUTO: 3.73 K/UL (ref 1.82–7.42)
NEUTROPHILS NFR BLD: 78 % (ref 44–72)
NRBC # BLD AUTO: 0 K/UL
NRBC BLD-RTO: 0 /100 WBC (ref 0–0.2)
OVALOCYTES BLD QL SMEAR: NORMAL
PLATELET # BLD AUTO: 111 K/UL (ref 164–446)
PLATELET BLD QL SMEAR: NORMAL
PMV BLD AUTO: 9.5 FL (ref 9–12.9)
POIKILOCYTOSIS BLD QL SMEAR: NORMAL
POTASSIUM SERPL-SCNC: 4.1 MMOL/L (ref 3.6–5.5)
PROT SERPL-MCNC: 7.8 G/DL (ref 6–8.2)
RBC # BLD AUTO: 2.48 M/UL (ref 4.7–6.1)
RBC BLD AUTO: PRESENT
SODIUM SERPL-SCNC: 136 MMOL/L (ref 135–145)
TSH SERPL DL<=0.005 MIU/L-ACNC: 3.1 UIU/ML (ref 0.38–5.33)
WBC # BLD AUTO: 4.8 K/UL (ref 4.8–10.8)

## 2023-12-27 PROCEDURE — 700117 HCHG RX CONTRAST REV CODE 255: Performed by: EMERGENCY MEDICINE

## 2023-12-27 PROCEDURE — 36415 COLL VENOUS BLD VENIPUNCTURE: CPT

## 2023-12-27 PROCEDURE — 700105 HCHG RX REV CODE 258: Performed by: STUDENT IN AN ORGANIZED HEALTH CARE EDUCATION/TRAINING PROGRAM

## 2023-12-27 PROCEDURE — 96365 THER/PROPH/DIAG IV INF INIT: CPT

## 2023-12-27 PROCEDURE — 770006 HCHG ROOM/CARE - MED/SURG/GYN SEMI*

## 2023-12-27 PROCEDURE — 700101 HCHG RX REV CODE 250: Performed by: EMERGENCY MEDICINE

## 2023-12-27 PROCEDURE — 700111 HCHG RX REV CODE 636 W/ 250 OVERRIDE (IP): Mod: JZ | Performed by: EMERGENCY MEDICINE

## 2023-12-27 PROCEDURE — 85025 COMPLETE CBC W/AUTO DIFF WBC: CPT

## 2023-12-27 PROCEDURE — 70481 CT ORBIT/EAR/FOSSA W/DYE: CPT

## 2023-12-27 PROCEDURE — 99223 1ST HOSP IP/OBS HIGH 75: CPT | Mod: AI | Performed by: STUDENT IN AN ORGANIZED HEALTH CARE EDUCATION/TRAINING PROGRAM

## 2023-12-27 PROCEDURE — 84443 ASSAY THYROID STIM HORMONE: CPT

## 2023-12-27 PROCEDURE — 99497 ADVNCD CARE PLAN 30 MIN: CPT | Performed by: STUDENT IN AN ORGANIZED HEALTH CARE EDUCATION/TRAINING PROGRAM

## 2023-12-27 PROCEDURE — 99285 EMERGENCY DEPT VISIT HI MDM: CPT

## 2023-12-27 PROCEDURE — 80053 COMPREHEN METABOLIC PANEL: CPT

## 2023-12-27 PROCEDURE — 700105 HCHG RX REV CODE 258: Performed by: EMERGENCY MEDICINE

## 2023-12-27 RX ORDER — ACETAMINOPHEN 325 MG/1
650 TABLET ORAL EVERY 6 HOURS PRN
Status: DISCONTINUED | OUTPATIENT
Start: 2023-12-27 | End: 2023-12-27

## 2023-12-27 RX ORDER — AMOXICILLIN 250 MG
2 CAPSULE ORAL 2 TIMES DAILY
Status: DISCONTINUED | OUTPATIENT
Start: 2023-12-27 | End: 2023-12-30 | Stop reason: HOSPADM

## 2023-12-27 RX ORDER — TAMSULOSIN HYDROCHLORIDE 0.4 MG/1
0.4 CAPSULE ORAL EVERY MORNING
Status: DISCONTINUED | OUTPATIENT
Start: 2023-12-28 | End: 2023-12-30 | Stop reason: HOSPADM

## 2023-12-27 RX ORDER — OXYCODONE HYDROCHLORIDE 5 MG/1
5 TABLET ORAL
Status: DISCONTINUED | OUTPATIENT
Start: 2023-12-27 | End: 2023-12-30 | Stop reason: HOSPADM

## 2023-12-27 RX ORDER — PROPARACAINE HYDROCHLORIDE 5 MG/ML
1 SOLUTION/ DROPS OPHTHALMIC ONCE
Status: COMPLETED | OUTPATIENT
Start: 2023-12-27 | End: 2023-12-27

## 2023-12-27 RX ORDER — POLYETHYLENE GLYCOL 3350 17 G/17G
1 POWDER, FOR SOLUTION ORAL
Status: DISCONTINUED | OUTPATIENT
Start: 2023-12-27 | End: 2023-12-30 | Stop reason: HOSPADM

## 2023-12-27 RX ORDER — ONDANSETRON 2 MG/ML
4 INJECTION INTRAMUSCULAR; INTRAVENOUS EVERY 4 HOURS PRN
Status: DISCONTINUED | OUTPATIENT
Start: 2023-12-27 | End: 2023-12-30 | Stop reason: HOSPADM

## 2023-12-27 RX ORDER — ATORVASTATIN CALCIUM 40 MG/1
80 TABLET, FILM COATED ORAL NIGHTLY
Status: DISCONTINUED | OUTPATIENT
Start: 2023-12-27 | End: 2023-12-30 | Stop reason: HOSPADM

## 2023-12-27 RX ORDER — CETIRIZINE HYDROCHLORIDE 10 MG/1
10 TABLET ORAL EVERY MORNING
Status: DISCONTINUED | OUTPATIENT
Start: 2023-12-28 | End: 2023-12-30 | Stop reason: HOSPADM

## 2023-12-27 RX ORDER — SODIUM CHLORIDE, SODIUM LACTATE, POTASSIUM CHLORIDE, CALCIUM CHLORIDE 600; 310; 30; 20 MG/100ML; MG/100ML; MG/100ML; MG/100ML
INJECTION, SOLUTION INTRAVENOUS CONTINUOUS
Status: DISCONTINUED | OUTPATIENT
Start: 2023-12-27 | End: 2023-12-30 | Stop reason: HOSPADM

## 2023-12-27 RX ORDER — SODIUM CHLORIDE, SODIUM LACTATE, POTASSIUM CHLORIDE, AND CALCIUM CHLORIDE .6; .31; .03; .02 G/100ML; G/100ML; G/100ML; G/100ML
500 INJECTION, SOLUTION INTRAVENOUS
Status: DISCONTINUED | OUTPATIENT
Start: 2023-12-27 | End: 2023-12-30 | Stop reason: HOSPADM

## 2023-12-27 RX ORDER — BISACODYL 10 MG
10 SUPPOSITORY, RECTAL RECTAL
Status: DISCONTINUED | OUTPATIENT
Start: 2023-12-27 | End: 2023-12-30 | Stop reason: HOSPADM

## 2023-12-27 RX ORDER — ONDANSETRON 4 MG/1
4 TABLET, ORALLY DISINTEGRATING ORAL EVERY 4 HOURS PRN
Status: DISCONTINUED | OUTPATIENT
Start: 2023-12-27 | End: 2023-12-30 | Stop reason: HOSPADM

## 2023-12-27 RX ORDER — LABETALOL HYDROCHLORIDE 5 MG/ML
10 INJECTION, SOLUTION INTRAVENOUS EVERY 4 HOURS PRN
Status: DISCONTINUED | OUTPATIENT
Start: 2023-12-27 | End: 2023-12-30 | Stop reason: HOSPADM

## 2023-12-27 RX ORDER — OXYCODONE HYDROCHLORIDE 5 MG/1
2.5 TABLET ORAL
Status: DISCONTINUED | OUTPATIENT
Start: 2023-12-27 | End: 2023-12-30 | Stop reason: HOSPADM

## 2023-12-27 RX ORDER — ISOSORBIDE MONONITRATE 30 MG/1
60 TABLET, EXTENDED RELEASE ORAL EVERY MORNING
Status: DISCONTINUED | OUTPATIENT
Start: 2023-12-28 | End: 2023-12-30 | Stop reason: HOSPADM

## 2023-12-27 RX ORDER — ACETAMINOPHEN 325 MG/1
650 TABLET ORAL EVERY 6 HOURS PRN
Status: DISCONTINUED | OUTPATIENT
Start: 2024-01-02 | End: 2023-12-30 | Stop reason: HOSPADM

## 2023-12-27 RX ORDER — ACETAMINOPHEN 325 MG/1
650 TABLET ORAL EVERY 6 HOURS
Status: DISCONTINUED | OUTPATIENT
Start: 2023-12-28 | End: 2023-12-30 | Stop reason: HOSPADM

## 2023-12-27 RX ORDER — ENOXAPARIN SODIUM 100 MG/ML
30 INJECTION SUBCUTANEOUS DAILY
Status: DISCONTINUED | OUTPATIENT
Start: 2023-12-28 | End: 2023-12-27

## 2023-12-27 RX ADMIN — SODIUM CHLORIDE, POTASSIUM CHLORIDE, SODIUM LACTATE AND CALCIUM CHLORIDE: 600; 310; 30; 20 INJECTION, SOLUTION INTRAVENOUS at 20:58

## 2023-12-27 RX ADMIN — IOHEXOL 80 ML: 350 INJECTION, SOLUTION INTRAVENOUS at 16:25

## 2023-12-27 RX ADMIN — PROPARACAINE HYDROCHLORIDE 1 DROP: 5 SOLUTION/ DROPS OPHTHALMIC at 18:55

## 2023-12-27 RX ADMIN — FLUORESCEIN SODIUM 1 MG: 1 STRIP OPHTHALMIC at 14:42

## 2023-12-27 RX ADMIN — PROPARACAINE HYDROCHLORIDE 1 DROP: 5 SOLUTION/ DROPS OPHTHALMIC at 14:42

## 2023-12-27 RX ADMIN — AMPICILLIN AND SULBACTAM 3 G: 1; 2 INJECTION, POWDER, FOR SOLUTION INTRAMUSCULAR; INTRAVENOUS at 18:32

## 2023-12-27 ASSESSMENT — LIFESTYLE VARIABLES
TOTAL SCORE: 0
HAVE PEOPLE ANNOYED YOU BY CRITICIZING YOUR DRINKING: NO
HAVE YOU EVER FELT YOU SHOULD CUT DOWN ON YOUR DRINKING: NO
EVER HAD A DRINK FIRST THING IN THE MORNING TO STEADY YOUR NERVES TO GET RID OF A HANGOVER: NO
HOW MANY TIMES IN THE PAST YEAR HAVE YOU HAD 5 OR MORE DRINKS IN A DAY: 0
TOTAL SCORE: 0
EVER FELT BAD OR GUILTY ABOUT YOUR DRINKING: NO
AVERAGE NUMBER OF DAYS PER WEEK YOU HAVE A DRINK CONTAINING ALCOHOL: 0
CONSUMPTION TOTAL: NEGATIVE
TOTAL SCORE: 0
ALCOHOL_USE: NO
ON A TYPICAL DAY WHEN YOU DRINK ALCOHOL HOW MANY DRINKS DO YOU HAVE: 0

## 2023-12-27 ASSESSMENT — PAIN DESCRIPTION - PAIN TYPE: TYPE: ACUTE PAIN

## 2023-12-27 ASSESSMENT — FIBROSIS 4 INDEX: FIB4 SCORE: 2.77

## 2023-12-27 NOTE — ED PROVIDER NOTES
ED Provider Note    CHIEF COMPLAINT  Chief Complaint   Patient presents with    Eye Pain     Bilateral eye pain, pt is followed by an ophthalmologist. Redness and swelling noted to both eyes with pt reporting blurred vision        HPI    Primary care provider: Pcp Not In Computer   History obtained from: Patient  History limited by: None     King Alegria is a 78 y.o. male who presents to the ED complaining of bilateral eye pain that started perhaps 4 days ago.  He denies injury or trauma.  He does not wear contact lenses.  He states that he wears reading glasses as needed.  He denies fever/chills/nausea/vomiting/shortness of breath or difficulty breathing.  Patient is on 2 L of oxygen during the day.  He reports blurry vision but reports his main concern is the pain.    REVIEW OF SYSTEMS  Please see HPI for pertinent positives/negatives.  All other systems reviewed and are negative.     PAST MEDICAL HISTORY  Past Medical History:   Diagnosis Date    Lung nodule 09/15/2023    Former smoker 09/15/2023    Chronic respiratory failure with hypoxia (HCC) 09/15/2023    Uses 2lpm at rest, 3lpm with ambulation and at night    Pancytopenia (HCC) 09/15/2023    Rheumatoid arthritis (HCC) 12/15/2015    Heart attack (HCC) 1999    Had stents placed last month    Heart attack (HCC) 1998    Arthritis     COPD (chronic obstructive pulmonary disease) (HCC)     Hyperlipidemia     Hypertension     Prostate cancer (HCC) 2 years ago    Had seeds        SURGICAL HISTORY  History reviewed. No pertinent surgical history.     SOCIAL HISTORY  Social History     Tobacco Use    Smoking status: Former     Current packs/day: 0.00     Average packs/day: 2.0 packs/day for 50.0 years (100.0 ttl pk-yrs)     Types: Cigarettes     Start date: 11/30/1963     Quit date: 11/30/2013     Years since quitting: 10.0     Passive exposure: Never    Smokeless tobacco: Never   Vaping Use    Vaping Use:  "Never used   Substance and Sexual Activity    Alcohol use: No     Comment: ETOH abue in the past    Drug use: No    Sexual activity: Not on file        FAMILY HISTORY  Family History   Problem Relation Age of Onset    Cancer Mother         CURRENT MEDICATIONS  Home Medications       Reviewed by Eunice Cadena (Pharmacy Tech) on 12/27/23 at 1914  Med List Status: Complete     Medication Last Dose Status   atenolol (TENORMIN) 25 MG Tab 12/27/2023 Active   atorvastatin (LIPITOR) 80 MG tablet 12/26/2023 Active   cetirizine (ZYRTEC) 10 MG Tab 12/27/2023 Active   docusate calcium (SURFAK) 240 MG Cap 12/26/2023 Active   isosorbide mononitrate SR (IMDUR) 60 MG TABLET SR 24 HR 12/27/2023 Active   oxybutynin (DITROPAN) 5 MG Tab 12/26/2023 Active   potassium chloride SA (KDUR) 20 MEQ Tab CR 12/27/2023 Active   tamsulosin (FLOMAX) 0.4 MG capsule 12/27/2023 Active                     ALLERGIES  No Known Allergies     PHYSICAL EXAM  VITAL SIGNS: /55   Pulse 67   Temp 37.3 °C (99.2 °F) (Temporal)   Resp 16   Ht 1.727 m (5' 8\")   Wt 54.4 kg (120 lb)   SpO2 95%   BMI 18.25 kg/m²  @LORRAINE[[id number]::@     Pulse ox interpretation: 90% on room air I interpret this pulse ox as borderline    Constitutional: Well developed, well nourished, alert in no apparent distress, nontoxic appearance    HENT: No external signs of trauma, normocephalic, oropharynx moist and clear, no discrete fluorescein uptake or foreign body noted  Eyes: PERRL, EOMI, conjunctiva without erythema, mild clear discharge bilaterally, no icterus, bilateral periorbital erythema and mild swelling  Neck: Soft and supple, trachea midline, no stridor, no tenderness, no LAD, good ROM    Thorax & Lungs: No respiratory distress  Extremities: No cyanosis, no gross deformity  Skin: Warm, dry, no pallor/cyanosis, no rash noted except as above  Neuro: A/O times 3, no focal deficits noted    Psychiatric: Cooperative      DIAGNOSTIC STUDIES / PROCEDURES    IOP using " Tonopen:  R 20  L 22    LABS  All labs reviewed by me.     Results for orders placed or performed during the hospital encounter of 12/27/23   CBC WITH DIFFERENTIAL   Result Value Ref Range    WBC 4.8 4.8 - 10.8 K/uL    RBC 2.48 (L) 4.70 - 6.10 M/uL    Hemoglobin 8.8 (L) 14.0 - 18.0 g/dL    Hematocrit 26.7 (L) 42.0 - 52.0 %    .7 (H) 81.4 - 97.8 fL    MCH 35.5 (H) 27.0 - 33.0 pg    MCHC 33.0 32.3 - 36.5 g/dL    RDW 76.7 (H) 35.9 - 50.0 fL    Platelet Count 111 (L) 164 - 446 K/uL    MPV 9.5 9.0 - 12.9 fL    Neutrophils-Polys 78.00 (H) 44.00 - 72.00 %    Lymphocytes 10.30 (L) 22.00 - 41.00 %    Monocytes 9.60 0.00 - 13.40 %    Eosinophils 0.60 0.00 - 6.90 %    Basophils 0.20 0.00 - 1.80 %    Immature Granulocytes 1.30 (H) 0.00 - 0.90 %    Nucleated RBC 0.00 0.00 - 0.20 /100 WBC    Neutrophils (Absolute) 3.73 1.82 - 7.42 K/uL    Lymphs (Absolute) 0.49 (L) 1.00 - 4.80 K/uL    Monos (Absolute) 0.46 0.00 - 0.85 K/uL    Eos (Absolute) 0.03 0.00 - 0.51 K/uL    Baso (Absolute) 0.01 0.00 - 0.12 K/uL    Immature Granulocytes (abs) 0.06 0.00 - 0.11 K/uL    NRBC (Absolute) 0.00 K/uL    Anisocytosis 1+     Macrocytosis 1+    COMP METABOLIC PANEL   Result Value Ref Range    Sodium 136 135 - 145 mmol/L    Potassium 4.1 3.6 - 5.5 mmol/L    Chloride 102 96 - 112 mmol/L    Co2 25 20 - 33 mmol/L    Anion Gap 9.0 7.0 - 16.0    Glucose 100 (H) 65 - 99 mg/dL    Bun 10 8 - 22 mg/dL    Creatinine 0.66 0.50 - 1.40 mg/dL    Calcium 9.7 8.5 - 10.5 mg/dL    Correct Calcium 10.2 8.5 - 10.5 mg/dL    AST(SGOT) 19 12 - 45 U/L    ALT(SGPT) 18 2 - 50 U/L    Alkaline Phosphatase 85 30 - 99 U/L    Total Bilirubin 0.5 0.1 - 1.5 mg/dL    Albumin 3.4 3.2 - 4.9 g/dL    Total Protein 7.8 6.0 - 8.2 g/dL    Globulin 4.4 (H) 1.9 - 3.5 g/dL    A-G Ratio 0.8 g/dL   TSH WITH REFLEX TO FT4   Result Value Ref Range    TSH 3.100 0.380 - 5.330 uIU/mL   ESTIMATED GFR   Result Value Ref Range    GFR (CKD-EPI) 97 >60 mL/min/1.73 m 2   PLATELET ESTIMATE   Result  Value Ref Range    Plt Estimation Decreased    MORPHOLOGY   Result Value Ref Range    RBC Morphology Present     Poikilocytosis 1+     Ovalocytes 1+    PERIPHERAL SMEAR REVIEW   Result Value Ref Range    Peripheral Smear Review see below    DIFFERENTIAL COMMENT   Result Value Ref Range    Comments-Diff see below         RADIOLOGY  I have independently interpreted the diagnostic imaging associated with this visit and am waiting the final reading from the radiologist.     [id number] WITH   Final Result      1.  Bilateral cataract surgery.   2.  Bilateral periorbital soft tissue swelling, preseptal. Findings most consistent with periorbital cellulitis. No kassandra abscess pocket is evident at this time.   3.  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 Inflammatory orbital pseudotumor would not be excluded.   4.  Dehiscent nasal septum. This can be associated with chronic use of vasoactive nasal spray or other sympathomimetic agents, vasculitis (as in Wegener's), or may represent postoperative change or posttraumatic septal necrosis (consequence of septal    hematoma).             COURSE & MEDICAL DECISION MAKING  Nursing notes, VS, PMSFHx reviewed in chart.     Review of past medical [facility]  Patient was admitted at [facility] on December 7, 2023 for acute on chronic respiratory failure with hypoxia and discharged in December 9, 2023.  Patient was seen in the office by ophthalmology on December 5, 2023 for thyroid orbitopathy.      Differential diagnoses considered include but are not limited to: Conjunctivitis, corneal abrasion/ulcer, glaucoma, keratitis, scleritis, periorbital/orbital cellulitis      ED Observation Status? Yes; I am placing the patient in to an observation status due to a diagnostic uncertainty as well as therapeutic intensity. Patient placed in observation status at 1:34 PM,  12/27/2023.     Observation plan is as follows: We will obtain laboratory and imaging studies and monitor patient in the ED.    Upon Reevaluation, the patient's condition has: not improved; and will be escalated to hospitalization.    Patient discharged from ED Observation status at 1905 on December 27, 2023.       INITIAL ASSESSMENT AND PLAN  Care Narrative: This is a 78-year-old male patient with medical history including CAD, MI, COPD on 2 L of oxygen at baseline, hypertension, hyperlipidemia, prostate cancer, rheumatoid arthritis who presents to the ED complaining of bilateral eye pain with redness and blurry vision.  Patient had admission last month for orbital cellulitis.  Will obtain laboratory and imaging studies and closely monitor patient in the ED.      Discussion of management with other [facility] [place] or appropriate source(s): Hospitalist    1905: D/W hospitalist who will admit patient      History and physical exam as above.  Laboratory testing shows chronic and fairly stable anemia and thrombocytopenia when compared to prior results.  No leukocytosis and patient without fever in the ED.  No significant electrolyte derangement or evidence for hepatic or renal dysfunction.  TSH in normal range.  CT with findings as above.  Record review shows patient was admitted in November for orbital cellulitis and had outpatient visit with ophthalmology on December 5, 2023 regarding thyroid orbitopathy.  Given his recurrent episodes of cellulitis, Unasyn was initiated.  I discussed the findings with the patient and he is requesting admission given his recurrent symptoms.  He remained stable in the ED and I have low clinical suspicion at this time for sepsis, meningitis, orbital cellulitis or abscess.  I discussed with the hospitalist who graciously agreed to admit patient.      FINAL IMPRESSION  1. Preseptal cellulitis Acute          DISPOSITION  Patient will be admitted by hospitalist for further care      Electronically  signed by: Jorge Bailey D.O., 12/27/2023 1:23 PM      Portions of this record were made with voice recognition software.  Despite my review, errors may remain.  Please interpret this chart in the appropriate context.

## 2023-12-27 NOTE — ED TRIAGE NOTES
King Alegria  78 y.o. male  Chief Complaint   Patient presents with    Eye Pain     Bilateral eye pain, pt is followed by an ophthalmologist. Redness and swelling noted to both eyes with pt reporting blurred vision       Vitals:    12/27/23 1147   BP: 98/55   Pulse: 77   Resp: 18   Temp: 37.3 °C (99.2 °F)   SpO2: 90%       Patient educated on triage process and encouraged to alert staff of any changes in condition.    Pt to triage via wheelchair

## 2023-12-28 PROBLEM — Z71.89 ACP (ADVANCE CARE PLANNING): Status: ACTIVE | Noted: 2023-12-28

## 2023-12-28 PROBLEM — R45.851 SUICIDAL IDEATIONS: Status: ACTIVE | Noted: 2023-12-28

## 2023-12-28 PROBLEM — J44.9 COPD (CHRONIC OBSTRUCTIVE PULMONARY DISEASE) (HCC): Status: ACTIVE | Noted: 2023-12-07

## 2023-12-28 LAB
APPEARANCE UR: CLEAR
BILIRUB UR QL STRIP.AUTO: NEGATIVE
COLOR UR: YELLOW
CORTIS SERPL-MCNC: 19.9 UG/DL (ref 0–23)
CRP SERPL HS-MCNC: 5.16 MG/DL (ref 0–0.75)
ERYTHROCYTE [SEDIMENTATION RATE] IN BLOOD BY WESTERGREN METHOD: >140 MM/HOUR (ref 0–20)
GLUCOSE UR STRIP.AUTO-MCNC: NEGATIVE MG/DL
KETONES UR STRIP.AUTO-MCNC: NEGATIVE MG/DL
LDH SERPL L TO P-CCNC: 203 U/L (ref 107–266)
LEUKOCYTE ESTERASE UR QL STRIP.AUTO: NEGATIVE
MICRO URNS: ABNORMAL
NITRITE UR QL STRIP.AUTO: NEGATIVE
PH UR STRIP.AUTO: 6.5 [PH] (ref 5–8)
PROCALCITONIN SERPL-MCNC: 0.08 NG/ML
PROT UR QL STRIP: NEGATIVE MG/DL
RBC UR QL AUTO: NEGATIVE
SP GR UR STRIP.AUTO: >=1.045
T3FREE SERPL-MCNC: 0.95 PG/ML (ref 2–4.4)
T4 FREE SERPL-MCNC: 0.89 NG/DL (ref 0.93–1.7)
TSH SERPL DL<=0.005 MIU/L-ACNC: 3.21 UIU/ML (ref 0.38–5.33)
UROBILINOGEN UR STRIP.AUTO-MCNC: 1 MG/DL

## 2023-12-28 PROCEDURE — A9270 NON-COVERED ITEM OR SERVICE: HCPCS | Performed by: INTERNAL MEDICINE

## 2023-12-28 PROCEDURE — 84145 PROCALCITONIN (PCT): CPT

## 2023-12-28 PROCEDURE — 92610 EVALUATE SWALLOWING FUNCTION: CPT

## 2023-12-28 PROCEDURE — 94664 DEMO&/EVAL PT USE INHALER: CPT

## 2023-12-28 PROCEDURE — 700102 HCHG RX REV CODE 250 W/ 637 OVERRIDE(OP): Performed by: STUDENT IN AN ORGANIZED HEALTH CARE EDUCATION/TRAINING PROGRAM

## 2023-12-28 PROCEDURE — 700111 HCHG RX REV CODE 636 W/ 250 OVERRIDE (IP): Mod: JZ | Performed by: STUDENT IN AN ORGANIZED HEALTH CARE EDUCATION/TRAINING PROGRAM

## 2023-12-28 PROCEDURE — 82533 TOTAL CORTISOL: CPT

## 2023-12-28 PROCEDURE — 83615 LACTATE (LD) (LDH) ENZYME: CPT

## 2023-12-28 PROCEDURE — A9270 NON-COVERED ITEM OR SERVICE: HCPCS | Performed by: STUDENT IN AN ORGANIZED HEALTH CARE EDUCATION/TRAINING PROGRAM

## 2023-12-28 PROCEDURE — 770001 HCHG ROOM/CARE - MED/SURG/GYN PRIV*

## 2023-12-28 PROCEDURE — 84481 FREE ASSAY (FT-3): CPT

## 2023-12-28 PROCEDURE — 99233 SBSQ HOSP IP/OBS HIGH 50: CPT | Performed by: INTERNAL MEDICINE

## 2023-12-28 PROCEDURE — 36415 COLL VENOUS BLD VENIPUNCTURE: CPT

## 2023-12-28 PROCEDURE — 85652 RBC SED RATE AUTOMATED: CPT

## 2023-12-28 PROCEDURE — 86140 C-REACTIVE PROTEIN: CPT

## 2023-12-28 PROCEDURE — 81003 URINALYSIS AUTO W/O SCOPE: CPT

## 2023-12-28 PROCEDURE — 700105 HCHG RX REV CODE 258: Performed by: STUDENT IN AN ORGANIZED HEALTH CARE EDUCATION/TRAINING PROGRAM

## 2023-12-28 PROCEDURE — 700102 HCHG RX REV CODE 250 W/ 637 OVERRIDE(OP): Performed by: INTERNAL MEDICINE

## 2023-12-28 PROCEDURE — 84439 ASSAY OF FREE THYROXINE: CPT

## 2023-12-28 PROCEDURE — 84443 ASSAY THYROID STIM HORMONE: CPT

## 2023-12-28 PROCEDURE — 700111 HCHG RX REV CODE 636 W/ 250 OVERRIDE (IP): Performed by: INTERNAL MEDICINE

## 2023-12-28 RX ORDER — PREDNISONE 5 MG/1
5 TABLET ORAL DAILY
Status: DISCONTINUED | OUTPATIENT
Start: 2023-12-28 | End: 2023-12-29

## 2023-12-28 RX ORDER — ASPIRIN 81 MG/1
81 TABLET, CHEWABLE ORAL DAILY
Status: DISCONTINUED | OUTPATIENT
Start: 2023-12-28 | End: 2023-12-30 | Stop reason: HOSPADM

## 2023-12-28 RX ORDER — PREDNISONE 5 MG/1
5 TABLET ORAL DAILY
Status: DISCONTINUED | OUTPATIENT
Start: 2023-12-28 | End: 2023-12-28

## 2023-12-28 RX ORDER — LEVOTHYROXINE SODIUM 0.15 MG/1
150 TABLET ORAL
Status: DISCONTINUED | OUTPATIENT
Start: 2023-12-28 | End: 2023-12-30 | Stop reason: HOSPADM

## 2023-12-28 RX ADMIN — TAMSULOSIN HYDROCHLORIDE 0.4 MG: 0.4 CAPSULE ORAL at 09:13

## 2023-12-28 RX ADMIN — ACETAMINOPHEN 650 MG: 325 TABLET, FILM COATED ORAL at 11:44

## 2023-12-28 RX ADMIN — AMPICILLIN AND SULBACTAM 3 G: 1; 2 INJECTION, POWDER, FOR SOLUTION INTRAMUSCULAR; INTRAVENOUS at 01:10

## 2023-12-28 RX ADMIN — AMPICILLIN AND SULBACTAM 3 G: 1; 2 INJECTION, POWDER, FOR SOLUTION INTRAMUSCULAR; INTRAVENOUS at 05:36

## 2023-12-28 RX ADMIN — ATORVASTATIN CALCIUM 80 MG: 40 TABLET, FILM COATED ORAL at 21:58

## 2023-12-28 RX ADMIN — AMPICILLIN AND SULBACTAM 3 G: 1; 2 INJECTION, POWDER, FOR SOLUTION INTRAMUSCULAR; INTRAVENOUS at 18:10

## 2023-12-28 RX ADMIN — ACETAMINOPHEN 650 MG: 325 TABLET, FILM COATED ORAL at 18:09

## 2023-12-28 RX ADMIN — ACETAMINOPHEN 650 MG: 325 TABLET, FILM COATED ORAL at 09:13

## 2023-12-28 RX ADMIN — PREDNISONE 5 MG: 5 TABLET ORAL at 11:44

## 2023-12-28 RX ADMIN — ASPIRIN 81 MG: 81 TABLET, CHEWABLE ORAL at 15:08

## 2023-12-28 RX ADMIN — AMPICILLIN AND SULBACTAM 3 G: 1; 2 INJECTION, POWDER, FOR SOLUTION INTRAMUSCULAR; INTRAVENOUS at 11:47

## 2023-12-28 RX ADMIN — LEVOTHYROXINE SODIUM 150 MCG: 0.15 TABLET ORAL at 10:38

## 2023-12-28 RX ADMIN — CETIRIZINE HYDROCHLORIDE 10 MG: 10 TABLET, FILM COATED ORAL at 09:12

## 2023-12-28 ASSESSMENT — PAIN DESCRIPTION - PAIN TYPE
TYPE: ACUTE PAIN

## 2023-12-28 ASSESSMENT — ENCOUNTER SYMPTOMS
EYE DISCHARGE: 1
EYE PAIN: 1
DEPRESSION: 0
CHILLS: 0
DOUBLE VISION: 1
EYE REDNESS: 1
HEADACHES: 0
FEVER: 0
ABDOMINAL PAIN: 0
COUGH: 0
PHOTOPHOBIA: 1
NAUSEA: 0
SHORTNESS OF BREATH: 0
PALPITATIONS: 0
HEARTBURN: 0
BLURRED VISION: 1
DEPRESSION: 1
DIZZINESS: 0
WEAKNESS: 1
BRUISES/BLEEDS EASILY: 0
MYALGIAS: 0

## 2023-12-28 ASSESSMENT — LIFESTYLE VARIABLES: SUBSTANCE_ABUSE: 0

## 2023-12-28 NOTE — CARE PLAN
Problem: Provide Safe Environment  Goal: Suicide environmental safety, protocols, policies, and practices will be implemented  Outcome: Progressing     Problem: Psychosocial  Goal: Patient's ability to identify and develop effective coping behaviors will improve  Outcome: Progressing  Goal: Patient's ability to identify and utilize available support systems will improve  Outcome: Progressing     The patient is Watcher - Medium risk of patient condition declining or worsening    Shift Goals  Clinical Goals: safety, rest, IV ABX  Patient Goals: rest  Family Goals: TARA    Progress made toward(s) clinical / shift goals:  Pt. Admitted with periorbital cellulitis, receiving IV Abx.  Pt. On legal hold for SI, 1:1 sitter in place, sharp/dangerous objects removed from, belongings kept in [place] [address] safekeeping, kitchen notified of plastic/paperware.  Treaded socks on, bed alarm on, yellow fall risk band on, appropriate fall risk sign on door.     Patient is not progressing towards the following goals:

## 2023-12-28 NOTE — CONSULTS
[facility]  PSYCHIATRIC CONSULTATION - Intake  New Patient    DOS: 12/28/23     Reason for Admission: 78 y.o. male who presents to the ED complaining of bilateral eye pain that started perhaps 4 days ago   Reason for Consult: Legal Hold Evaluation  Requesting LIP: Kael Mckeon M.D.   Psychiatric Consultant: FADI Cervantes    Legal Status: on legal hold    Chart Review: active problem list, medication list, allergies, family history, social history, health maintenance, notes from last encounter, lab results    CC:   Chief Complaint   Patient presents with    Eye Pain     Bilateral eye pain, pt is followed by an ophthalmologist. Redness and swelling noted to both eyes with pt reporting blurred vision       HPI:   Patient is a 78 year old male with no reported or documented Hx of psychiatric condition currently on legal hold after making suicidal statement. Patient admits making statement, expressing he was not serious, additionally removed his firearms after that fleeting thought of SI which occurred prior to hospitalization, admits was situational 2/2 the re-occurrence of his current medical condition.    Reports limited Sx of depression including: occasional depressed mood, insomnia 2/2 pain, decreased interests 2/2 seasonal change and lack of previously enjoyed activities. Denies feelings of guilt, denies recent SI.     Patient denies thoughts of self-harm, denies current SI/HI, A/VH. Patient denies symptoms indicative of psychosis, ernestine/hypomania, PTSD, OCD, or ADHD.     Medications:  Scheduled Medications   Medication Dose Frequency    levothyroxine  150 mcg AM ES    predniSONE  5 mg DAILY    aspirin  81 mg DAILY    senna-docusate  2 Tablet BID    ampicillin-sulbactam (UNASYN) IV  3 g Q6HRS    atorvastatin  80 mg Nightly    cetirizine  10 mg QAM    isosorbide mononitrate SR  60 mg QAM    tamsulosin  0.4 mg QAM    Pharmacy Consult Request  1 Each PHARMACY TO DOSE    acetaminophen  650 mg  "Q6HRS       Allergies:   No Known Allergies     MSE:  /49   Pulse 67   Temp 37.1 °C (98.7 °F) (Temporal)   Resp 16   Ht 1.727 m (5' 8\")   Wt 54.4 kg (120 lb)   SpO2 93%     Constitutional: as noted above  General Appearance/Behavior: thin, good eye contact, cooperative, and friendly, No behavioral disturbances  Abnormal Movements: none, no PMA/PMR or tremor observed.  Gait and Posture: not observed  Musculoskeletal: as noted above  Mood: \"good\"  Affect: Mood/Congruent and Appropriate   Speech: normal rate, normal rhythm, normal tone, normal volume, and normal fluency  Language:  spontaneous, comprehends spoken commands, and fluent   Thought Process: Linear, Logical, and Goal Directed, Future Oriented  Thought Content: Denies SI/HI, A/VH. No e/o delusions, paranoia, or internal preoccupation  Insight/Judgement:  fair/fair  Alert/Orientation: alert, oriented to person, place and time  Attn/Concentration: normal  Fund of Knowledge: Average  Memory recent/remote: No gross evidence of memory deficits  MMSE: deferred this visit     Medical ROS:  Review of systems (+10 systems) unremarkable except for concerns noted by patient or items listed.  Please see HPI for additional ROS.    Past Psychiatric Hx:  Dx: Denies  IP:  Denies  OP: Denies  SI/SAs: Denies  Medication Trials:  None    Violence/HI: denies  Weapons: denies access    Substance Hx:  Denies current alcohol, nicotine, cannabis use, denies illicit substance use    Social History     Substance and Sexual Activity   Drug Use No     Substance Tx: No  Denies Hx of SZ, DT    Family Psych Hx:   Denies family Hx of psychiatric condition or HERO    Family History   Problem Relation Age of Onset    Cancer Mother         Social Hx:  Born in [facility]  Education: graduated HS  Enlisted in [employer] for 14mo   Abuse: Denies  Support: wife, Hardy   x4, no children  Housing: in home with wife  Financial: retired, denies concerns    Social History     Tobacco Use    " Smoking status: Former     Current packs/day: 0.00     Average packs/day: 2.0 packs/day for 50.0 years (100.0 ttl pk-yrs)     Types: Cigarettes     Start date: 11/30/1963     Quit date: 11/30/2013     Years since quitting: 10.0     Passive exposure: Never    Smokeless tobacco: Never   Vaping Use    Vaping Use: Never used   Substance Use Topics    Alcohol use: No     Comment: ETOH abue in the past    Drug use: No        Legal Hx:   Yes, DUI    Past Medical Hx:  Past Medical History:   Diagnosis Date    Arthritis     Chronic respiratory failure with hypoxia ([facility]) 09/15/2023    Uses 2lpm at rest, 3lpm with ambulation and at night    COPD (chronic obstructive pulmonary disease) ([facility])     Former smoker 09/15/2023    Heart attack ([facility]) 1998    Heart attack ([facility]) 1999    Had stents placed last month    Hyperlipidemia     Hypertension     Lung nodule 09/15/2023    Pancytopenia ([facility]) 09/15/2023    Prostate cancer ([facility]) 2 years ago    Had seeds    Rheumatoid arthritis ([facility]) 12/15/2015      Patient Active Problem List    Diagnosis Date Noted    ACP (advance care planning) 12/28/2023    Suicidal ideations 12/28/2023    Cellulitis of periorbital region of both eyes 12/27/2023    Acute on chronic respiratory failure with hypoxia ([facility]) 12/07/2023    Hypokalemia 12/07/2023    Lower extremity edema 12/07/2023    Elevated troponin 12/07/2023    Shock ([facility]) 12/07/2023    COPD (chronic obstructive pulmonary disease) ([facility]) 12/07/2023    SIRS (systemic inflammatory response syndrome) ([facility]) 12/07/2023    Glaucoma suspect of both eyes 11/15/2023    ARMD (age related macular degeneration) 11/15/2023    Cystoid macular edema of left eye 11/15/2023    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    Chronic anemia 11/09/2023    Thrombocytopenia ([facility]) 11/09/2023    Hypothyroid 11/09/2023    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    Aplastic anemia ([facility]) 11/09/2023    Orbital cellulitis 11/08/2023    Lung nodule 09/15/2023    Former smoker 09/15/2023    Chronic  "respiratory failure with hypoxia ([facility]) 09/15/2023    Pancytopenia ([facility]) 09/15/2023    Vitamin D deficiency 2016    Hypercalcemia 2016    Prostate cancer ([facility]) 2016    Disease of prostate 2016    Bone pain 2016    History of prostate cancer 2016    Rheumatoid arthritis ([facility]) 12/15/2015    Bilateral hand pain 2015    Primary osteoarthritis of hand 2015       Labs:  Reviewed:   No results found for: \"AMPHUR\", \"BARBSURINE\", \"BENZODIAZU\", \"COCAINEMET\", \"METHADONE\", \"ECSTASY\", \"OPIATES\", \"OXYCODN\", \"PCPURINE\", \"PROPOXY\", \"CANNABINOID\"  Recent Labs     23  1443   WBC 4.8   RBC 2.48*   HEMOGLOBIN 8.8*   HEMATOCRIT 26.7*   .7*   MCH 35.5*   RDW 76.7*   PLATELETCT 111*   MPV 9.5   NEUTSPOLYS 78.00*   LYMPHOCYTES 10.30*   MONOCYTES 9.60   EOSINOPHILS 0.60   BASOPHILS 0.20   RBCMORPHOLO Present     Recent Labs     23  1443   SODIUM 136   POTASSIUM 4.1   CHLORIDE 102   CO2 25   GLUCOSE 100*   BUN 10     Recent Labs     23  1443   ASTSGOT 19   ALTSGPT 18   TBILIRUBIN 0.5   ALKPHOSPHAT 85   GLOBULIN 4.4*       EKG:   Results for orders placed or performed during the hospital encounter of 23   EKG (Now)   Result Value Ref Range    Report       [facility] Emergency Dept.    Test Date:  2023  Pt Name:    DOUGLAS KHAN                Department: [facility]  MRN:        [id number]                      [facility]  Gender:     Male                         Technician: yang  :        1945                   Requested By:JUAN PRAJAPATI  Order #:    [id number]                    Selena MD: Juan Prajapati    Measurements  Intervals                                Axis  Rate:       90                           P:          19  TN:         155                          QRS:        71  QRSD:       110                          T:          -67  QT:         343  QTc:        420    Interpretive Statements  sinus rhythm at a rate of " 90, normal axis, normal intervals, no ST elevation  or depression, T wave inversion in 2, 3, aVF, V4, V5, V6.  No old EKG for  comparison.  No STEMI.  Electronically Signed On 2023 02:20:25 PST by Juan Prajapati     EKG   Result Value Ref Range    Report       [facility]    Test Date:  2023  Pt Name:    KING KHAN                Department: ICU  MRN:        [id number]                      Room:       3324  Gender:     Male                         Technician: [id number]  :        1945                   Requested By:ADAN CISNEROS  Order #:    [id number]                    Reading MD: Hever Nash MD    Measurements  Intervals                                Axis  Rate:       66                           P:          15  ID:         [id number]                          QRS:        55  QRSD:       106                          T:          -53  QT:         442  QTc:        464    Interpretive Statements  Sinus arrhythmia  Ventricular premature complex  Abnormal T, consider ischemia, inferior leads  Compared to ECG 2023 02:15:01  NO SIGNIFICANT CHANGES  Electronically Signed On 2023 00:56:55 PST by Hever Nash MD          =======================================================================================================    [facility]  PSYCHIATRIC CONSULTATION - Intake    Assessment:  Patient observed laying in bed, sitter at bedside. Patient has awareness of why psychiatry was consulted, admits recently making statement to staff about remote SI, and expressing having access to guns; reports to this writer that after that thought, he sold his guns (wife, Cristina, confirmed the guns were given to a friend of hers and King no longer has access to firearms). Patient presents with appropriate thinking, expressing situational SI based on medical decline, currently able to vocalize reasons for living, and a desire to get well. Minimal Sx of depression including  insomnia 2/2 orbital pain; decreased interests 2/2 winter weather reducing access to outdoors; and decreased appetite, denies SI/HI, no hallucinations, on Hx of psychiatric condition, IP  hospitalization. Patient does not currently present an acute danger to self.    Admission Dx:  1. Preseptal cellulitis Acute        Psychiatric:   Adjustment disorder, depression    Medical: as noted by the medical team.     Recommendations:  Legal Status: discontinued    Discussed/voalted: VIKA Patel MD    Medication Recommendations: Final orders as per Treatment Team  No medications    Reviewed safety plan: 911, ER, PCM, MHC, suicide crisis line, nursing staff while inpatient.    Signing Off. Thank you for the consult.

## 2023-12-28 NOTE — ED NOTES
Pt taken up to S6 by transport while this RN was in another pt room. Report called to JUSTIN Linton.

## 2023-12-28 NOTE — ASSESSMENT & PLAN NOTE
Patient has been seen by Dr. Ariza I spoke with his office  Periorbital swelling is due to thyroid disease.  [facility]  Patient has been tapered to prednisone 5 mg daily and needs Teppezza infusions which she was not able to start because of difficulty placing a PIV at [facility].  Case management arranging outpatient infusion  I placed a midline, order for home health IV care  Continue Unasyn for now  Still has eye swelling and erythema and pain, dose of 1 g Solu-Medrol today  I discussed with the patient and wife to follow-up with Dr. Ariza closely next week

## 2023-12-28 NOTE — ED NOTES
Med Rec complete per patient's spouse via phone call   Allergies reviewed  Antibiotics in the past 30 days:no  Anticoagulant in past 14 days:no  Pharmacy patient utilizes:[facility]    Pt was unable to verify medications he takes    Pt mentioned he was advised to stop taking Aspirin 81 mg    Verified medications with spouse via phone call. Per spouse pt was advised from  to stop taking any vitamins/supplements

## 2023-12-28 NOTE — ASSESSMENT & PLAN NOTE
TSH was normal, T3 and T4 were slightly low.  Patient ran out of his medication  Continue on Synthroid, refill ordered  Trying to arrange for Tepezza infusion

## 2023-12-28 NOTE — PROGRESS NOTES
Hospital Medicine Daily Progress Note    Date of Service  12/28/2023    Chief Complaint  King Alegria is a 78 y.o. male admitted 12/27/2023 with periorbital swelling    Hospital Course  79 yo man with CAD, COPD, rheumatoid arthritis, HTN, HLD, MDS/aplastic anemia [facility] who presented with bilateral eye swelling and pain.  Patient was recently hospitalized for left orbital cellulitis.  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  CT orbits showed bilateral periorbital soft tissue swelling preseptal, no abscess, enlargement of extraocular muscles suggestive of disc thyroid ophthalmology versus inflammatory orbital pseudotumor, dehiscent nasal septum, evidence of bilateral cataract surgery.  He was started on IV Unasyn.  Patient had reported to the nurse that patient had suicidal thoughts, thought about shooting himself with his guns and he was placed on a legal hold.  Psychiatry was consulted.    Interval Problem Update  Patient complains of bilateral eye swelling and pain and double vision  I called Dr. Ariza's office, he is out of town for the next week but spoke to his assistant.  Patient condition is a result of thyroid disease and had been on course of steroid that was tapered down to prednisone 5 mg daily, and the best treatment for him is for infusions of Tepezza.  [facility], however at the last visit he was too dehydrated and they cannot place a PIV, they explained that this would be the best treatment for his eyes.  [facility], still waiting on them.  I discussed with case management to see how soon we can set up Teppezza infusion appointment.  Continue Unasyn for now.  I restarted his Synthroid  Patient denies any depression or suicidal thoughts.  He confirms that there  are no guns at his house.  I discussed with psychiatry, legal hold being discontinued.  Patient is a poor historian, his wife is not answering the phone    I have discussed this patient's plan of care and discharge plan at IDT rounds today with Case Management, Nursing, Nursing leadership, and other members of the IDT team.    Consultants/Specialty  Psychiatry    Code Status  Full Code    Disposition  The patient is not medically cleared for discharge to home or a post-acute facility.  Anticipate discharge to: home with close outpatient follow-up    I have placed the appropriate orders for post-discharge needs.    Review of Systems  Review of Systems   Constitutional:  Negative for malaise/fatigue.   Eyes:  Positive for blurred vision and pain.   Respiratory:  Negative for shortness of breath.    Cardiovascular:  Negative for chest pain.   Gastrointestinal:  Negative for abdominal pain.   Psychiatric/Behavioral:  Negative for depression and suicidal ideas.         Physical Exam  Temp:  [36.3 °C (97.4 °F)-36.9 °C (98.4 °F)] 36.3 °C (97.4 °F)  Pulse:  [56-68] 64  Resp:  [16-18] 16  BP: (104-122)/(51-93) 111/67  SpO2:  [93 %-97 %] 96 %    Physical Exam  Vitals and nursing note reviewed.   Constitutional:       Comments: Frail, thin   HENT:      Head: Normocephalic.      Mouth/Throat:      Mouth: Mucous membranes are dry.   Eyes:      General:         Right eye: No discharge.         Left eye: No discharge.      Comments: Periorbital erythema and edema   Cardiovascular:      Rate and Rhythm: Normal rate and regular rhythm.   Pulmonary:      Effort: Pulmonary effort is normal. No respiratory distress.      Breath sounds: No wheezing or rales.   Abdominal:      Palpations: Abdomen is soft.      Tenderness: There is no abdominal tenderness.   Musculoskeletal:         General: No swelling.      Cervical back: Neck supple.      Comments: Diffuse muscle atrophy   Skin:     General: Skin is warm and dry.   Neurological:       Mental Status: He is alert and oriented to person, place, and time.         Fluids    Intake/Output Summary (Last 24 hours) at 12/28/2023 1257  Last data filed at 12/28/2023 0714  Gross per 24 hour   Intake --   Output 300 ml   Net -300 ml       Laboratory  Recent Labs     12/27/23  1443   WBC 4.8   RBC 2.48*   HEMOGLOBIN 8.8*   HEMATOCRIT 26.7*   .7*   MCH 35.5*   MCHC 33.0   RDW 76.7*   PLATELETCT 111*   MPV 9.5     Recent Labs     12/27/23  1443   SODIUM 136   POTASSIUM 4.1   CHLORIDE 102   CO2 25   GLUCOSE 100*   BUN 10   CREATININE 0.66   CALCIUM 9.7                   Imaging  [id number] WITH   Final Result      1.  Bilateral cataract surgery.   2.  Bilateral periorbital soft tissue swelling, preseptal. Findings most consistent with periorbital cellulitis. No kassandra abscess pocket is evident at this time.   3.  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 Inflammatory orbital pseudotumor would not be excluded.   4.  Dehiscent nasal septum. This can be associated with chronic use of vasoactive nasal spray or other sympathomimetic agents, vasculitis (as in Wegener's), or may represent postoperative change or posttraumatic septal necrosis (consequence of septal    hematoma).           Assessment/Plan  * Cellulitis of periorbital region of both eyes- (present on admission)  Assessment & Plan  Patient has been seen by Dr. Ariza I spoke with his office  Periorbital swelling is due to thyroid disease.  [facility]  Patient has been tapered to prednisone 5 mg daily and needs Teppezza infusions which she was not able to start because of difficulty placing a PIV at [facility].  Case management to see if they can make an appointment for [facility]  Continue Unasyn for now    Suicidal ideations  Assessment & Plan  No longer suicidal, psychiatry was consulted and legal hold was discontinued    ACP (advance care  planning)  Assessment & Plan  Full code    COPD (chronic obstructive pulmonary disease) ([facility])- (present on admission)  Assessment & Plan  Not in acute exacerbation  Home meds    Hypothyroid- (present on admission)  Assessment & Plan  TSH was normal, T3 and T4 were slightly low  Continue on Synthroid  Trying to arrange for Tepezza infusion    Pancytopenia (HCC)- (present on admission)  Assessment & Plan  Chronic         VTE prophylaxis:   SCDs/TEDs      I have performed a physical exam and reviewed and updated ROS and Plan today (12/28/2023). In review of yesterday's note (12/27/2023), there are no changes except as documented above.

## 2023-12-28 NOTE — DOCUMENTATION QUERY
[facility]                                                                       Query Response Note      PATIENT:               DOUGLAS KHAN  ACCT #:                  [id number]  MRN:                     [id number]  :                      1945  ADMIT DATE:       2023 12:49 PM  DISCH DATE:          RESPONDING  PROVIDER #:        [id number]           QUERY TEXT:    BMI <19 is documented in the Medical Record.      Can a diagnosis be provided to support this finding?    The patient's Clinical Indicators include:  Findings:  --Patient admitted for bilateral periorbital cellulitis, diplopia and SI  --Per H&P general:  There is no distention, there is no abdominal tenderness, no guarding or rebound  --BMI:  18.25, weight:  54.4kg, height:  1.727m per Epic chart review    Treatment:  --Weight/height/BMI calculations  --Regular diet    Risk Factors:  --Chronic pancytopenia  --Chronic hypoxic respiratory failure  --RA  --COPD    Thank you,  Eladia House RN, BSN  Clinical    Connect via [employer]  Options provided:   -- Underweight   -- Cachexia   -- Finding of no clinical significance   -- Other explanation, (please specify other explanation)      Query created by: Eladia House on 2023 7:29 AM    RESPONSE TEXT:    Underweight          Electronically signed by:  GAVIN TAVERAS MD 2023 10:12 AM

## 2023-12-28 NOTE — DIETARY
"Nutrition services: Day 1 of admit.  King Alegria is a 78 y.o. male with admitting DX of preseptal cellulitis.   Consult received for MST score of 5.     I met with patient this morning at bedside  He reports that he has not been eating well related to a decreased appetite for ~ 1 weeks.  Of note, patient admitted previously this month and RD saw at that time.  Weight down slightly in the last year months and he has a history of ~ 100 pounds of weight loss several years ago.  We discussed importance of nutrition with emphasis on protein rich foods.  He does not like Ensure supplements but is agreeable to trying magic cups and cottage cheese.  At home he reports that he tries to eat 3 meals per day but they more resemble small snacks.  He has dentures but they do not fit properly per SLP. Diet advanced this morning.       Assessment:  Height: 172.7 cm (5' 8\")  Weight: 54.4 kg (120 lb)  Body mass index is 18.25 kg/m²., BMI classification: Underweight  Diet/Intake: Level 6, soft and bite sized with thin liquids, cardiac diet.     Wt Readings from Last 6 Encounters:   12/27/23 54.4 kg (120 lb)   12/07/23 55.9 kg (123 lb 3.8 oz)   11/08/23 57.2 kg (126 lb)   09/15/23 58.1 kg (128 lb)   07/27/16 95.3 kg (210 lb)   04/19/16 97.5 kg (215 lb)         Evaluation:   History of COPD, HTN, hyperlipidemia, prostate cancer, rheumatoid arthritis.   Labs and meds reviewed   Last BM PTA  Edema to face, pressure injury to coccyx - no wound team notes at this time.   Physical assessment: Pt with signs of muscle and fat wasting evidenced by sunken temples, large black/red circles under eyes, thin skin on back of hand, squared shoulder, protruding clavicles, thin skin around neck area, and lacking definition or prominence in interosseous muscle on hand. Additionally patient reports that most of his clothing is too large for him at this time and he has noticed his body is smaller.     Malnutrition Risk: Patient appears to meet ASPEN " criteria for severe acute malnutrition related to multiple physical findings of muscle and fat wasting accompanied with severely decreased oral intake in the last week, estimated to be less than normal of his baseline.  Weight loss of 2.6% in the last 3 weeks not severe but is worth noting.        Recommendations/Plan:  Magic cups TID, cottage cheese 1x/day.    Encourage intake of meals and supplements.   Document intake of all PO as % taken in ADL's to provide interdisciplinary communication across all shifts.   Monitor weight.  Nutrition rep will continue to see patient for ongoing meal and snack preferences.     RD monitoring

## 2023-12-28 NOTE — THERAPY
"Speech Language Pathology   Clinical Swallow Evaluation     Patient Name: King Alegria  AGE:  78 y.o., SEX:  male  Medical Record #: [id number]  Date of Service: 12/28/2023      History of Present Illness  77 y/o admitted on 12/27 for eye pain. Not seen by SLP before at [facility].    CT orbit 12/27: \" Bilateral periorbital soft tissue swelling, preseptal. Findings most consistent with periorbital cellulitis. No kassandra abscess pocket is evident at this time. 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 Inflammatory orbital pseudotumor would not be excluded.\"        PMHx:  COPD, CAD, HTN, RA, hypothyroidism    General Information:  Vitals  O2 (LPM): 0  O2 Delivery Device: None - Room Air  Level of Consciousness: Alert  Patient Behaviors: Flat Affect  Follows Directives: Yes      Prior Living Situation & Level of Function:  Housing / Facility: [address] [place] [address] House  Lives with - Patient's Self Care Capacity: Spouse  Swallowing: poor PO intake       Oral Mechanism Evaluation:  Dentition: Edentulous   Facial Symmetry: Equal  Labial Observations: WFL   Lingual Observations: Midline  Motor Speech: WFL            Laryngeal Function:  Secretion Management: Adequate  Voice Quality: WFL            Subjective  Pt endorsed difficulty with mastication of hard foods without denture adhesive as dentures are ill fitting. RN to order from central supply. He endorsed intermittent pain while swallowing which began a few weeks ago but is transient.       Assessment  Current Method of Nutrition: NPO until cleared by speech pathology  Positioning: Art's (60-90 degrees)  Bolus Administration: Patient  O2 (LPM): 0 O2 Delivery Device: None - Room Air  Factor(s) Affecting Performance: None          Swallowing Trials:  Swallowing Trials  Thin Liquid (TN0): WFL  Pureed (PU4): WFL  Soft & Bite Sized (SB6): WFL  Regular (RG7): WFL      Comments: No overt s/sx of aspiration noted with trials of thin liquids, puree, " "soft and bite size, and meltable solids. Pt feeding self independently with mild difficulty 2/2 visual impairment. Mastication timely with all textures however, pt endorsed difficulty with chewing hard foods as he currently does not have denture adhesive. Vocal quality was clear following the swallow. Intermittent c/o odynophagia but endorsed improvement as session progressed. No c/o globus sensation.      Clinical Impressions  Pt is presenting with a baseline oral dysphagia 2/2 edentulism and use of dentures. Would recommend initiation of a soft and bite size/thin liquid diet pending denture adhesive. RN to order from central supply and SLP to reassess at a later time.     Recommendations  Diet Consistency: soft and bite size/thin liquids (needs denture adhesive)  Instrumentation: None indicated at this time  Medication: As tolerated  Supervision: Independent, Assist with meal tray set up  Positioning: Fully upright and midline during oral intake  Risk Management : Small bites/sips  Oral Care: BID         SLP Treatment Plan  Treatment Plan: Dysphagia Treatment  SLP Frequency: 2x Per Week  Estimated Duration: Until Therapy Goals Met      Anticipated Discharge Needs  Discharge Recommendations: Anticipate that the patient will have no further speech therapy needs after discharge from the hospital   Therapy Recommendations Upon DC: Not Indicated        Patient / Family Goals  Patient / Family Goal #1: \"I can't chew hard foods without adhesive\"  Short Term Goals  Short Term Goal # 1: Pt will consume a soft and bite size/thin liquid diet with no overt s/sx of aspiration.      Kinza Bird, SLP   "

## 2023-12-28 NOTE — H&P
Hospital Medicine History & Physical Note    Date of Service  12/27/2023    Primary Care Physician  Pcp Not In Computer    Consultants  None    Code Status  Full Code    Chief Complaint  Chief Complaint   Patient presents with    Eye Pain     Bilateral eye pain, pt is followed by an ophthalmologist. Redness and swelling noted to both eyes with pt reporting blurred vision       History of Presenting Illness  King Alegria is a 78 y.o. male with history of COPD, CAD, hypertension, hyperlipidemia, rheumatoid arthritis, hypothyroidism, MDS/[facility]plastic [facility] who presented 12/27/2023 with evaluation for bilateral eye swelling and pain.  Patient was recently admitted and discharged 12/9/2023 after being treated for left orbital cellulitis.  He stated having increased redness and swelling of bilateral eyes, associated with blurry vision/double vision.  He is unsure if he has seen ophthalmology outpatient when inquired.  In ER, CT orbits noted bilateral periorbital soft tissue swelling, preseptal regions consistent with periorbital cellulitis.  Admission requested by ERP.  Admitted to medicine service for further evaluation treatment.    I discussed the plan of care with patient and bedside RN.    Review of Systems  Review of Systems   Constitutional:  Positive for malaise/fatigue. Negative for chills and fever.   HENT:  Negative for hearing loss and tinnitus.    Eyes:  Positive for blurred vision, double vision, photophobia, pain, discharge and redness.   Respiratory:  Negative for cough and shortness of breath.    Cardiovascular:  Negative for chest pain and palpitations.   Gastrointestinal:  Negative for heartburn and nausea.   Genitourinary:  Negative for dysuria and hematuria.   Musculoskeletal:  Negative for joint pain and myalgias.   Skin:  Negative for itching and rash.   Neurological:  Positive for weakness. Negative for dizziness and headaches.   Endo/Heme/Allergies:  Negative for  environmental allergies. Does not bruise/bleed easily.   Psychiatric/Behavioral:  Positive for depression and suicidal ideas. Negative for substance abuse.    All other systems reviewed and are negative.      Past Medical History   has a past medical history of Arthritis, Chronic respiratory failure with hypoxia ([facility]) (09/15/2023), COPD (chronic obstructive pulmonary disease) ([facility]), Former smoker (09/15/2023), Heart attack ([facility]) (1998), Heart attack ([facility]) (1999), Hyperlipidemia, Hypertension, Lung nodule (09/15/2023), Pancytopenia ([facility]) (09/15/2023), Prostate cancer ([facility]) (2 years ago), and Rheumatoid arthritis ([facility]) (12/15/2015).    Surgical History   has no past surgical history on file.     Family History  family history includes Cancer in his mother.   Family history reviewed with patient. There is no family history that is pertinent to the chief complaint.     Social History   reports that he quit smoking about 10 years ago. His smoking use included cigarettes. He started smoking about 60 years ago. He has a 100.0 pack-year smoking history. He has never been exposed to tobacco smoke. He has never used smokeless tobacco. He reports that he does not drink alcohol and does not use drugs.    Allergies  No Known Allergies    Medications  Prior to Admission Medications   Prescriptions Last Dose Informant Patient Reported? Taking?   Cyanocobalamin (VITAMIN B-12) 1000 MCG Tab   Yes No   Sig: Take 1,000 mcg by mouth every morning.   ascorbic acid (VITAMIN C) 500 MG tablet   Yes No   Sig: Take 500 mg by mouth every morning.   aspirin EC (ECOTRIN) 81 MG Tablet Delayed Response   Yes No   Sig: Take 81 mg by mouth at bedtime.   atenolol (TENORMIN) 25 MG Tab   Yes No   Sig: Take 25 mg by mouth every morning.   atorvastatin (LIPITOR) 80 MG tablet   Yes No   Sig: Take 80 mg by mouth every evening.   cetirizine (ZYRTEC) 10 MG Tab   Yes No   Sig: Take 10 mg by mouth every morning.   docusate calcium (SURFAK) 240 MG Cap   Yes No    Sig: Take 240 mg by mouth at bedtime.   ferrous sulfate 325 (65 Fe) MG tablet   Yes No   Sig: Take 325 mg by mouth at bedtime.   isosorbide mononitrate SR (IMDUR) 60 MG TABLET SR 24 HR   Yes No   Sig: Take 60 mg by mouth every morning.   levothyroxine (SYNTHROID) 150 MCG Tab   No No   Sig: Take 1 Tablet by mouth every morning on an empty stomach.   oxybutynin (DITROPAN) 5 MG Tab   Yes No   Sig: Take 5 mg by mouth at bedtime.   potassium chloride SA (KDUR) 20 MEQ Tab CR   Yes No   Sig: Take 20 mEq by mouth 2 times a day.   pyridoxine (VITAMIN B-6) 50 MG Tab   Yes No   Sig: Take 50 mg by mouth every morning.   tamsulosin (FLOMAX) 0.4 MG capsule   Yes No   Sig: Take 0.4 mg by mouth every morning.   vitamin D (CHOLECALCIFEROL) 1000 UNIT Tab   Yes No   Sig: Take 1,000 Units by mouth every day.      Facility-Administered Medications: None       Physical Exam  Temp:  [36.7 °C (98 °F)-37.3 °C (99.2 °F)] 36.7 °C (98 °F)  Pulse:  [56-77] 56  Resp:  [16-18] 18  BP: ()/(51-93) 122/93  SpO2:  [90 %-97 %] 97 %  Blood Pressure : 117/60   Temperature: 37.3 °C (99.2 °F)   Pulse: 62   Respiration: 16   Pulse Oximetry: 97 %       Physical Exam  Vitals and nursing note reviewed.   Constitutional:       General: He is not in acute distress.  HENT:      Head: Normocephalic and atraumatic.      Nose: Nose normal.      Mouth/Throat:      Mouth: Mucous membranes are dry.      Pharynx: Oropharynx is clear.   Eyes:      Comments: Periorbital swelling, erythema, tenderness   Cardiovascular:      Rate and Rhythm: Tachycardia present.      Pulses: Normal pulses.      Heart sounds:      No friction rub.   Pulmonary:      Effort: No respiratory distress.      Breath sounds: No wheezing or rales.   Chest:      Chest wall: No tenderness.   Abdominal:      General: There is no distension.      Tenderness: There is no abdominal tenderness. There is no guarding or rebound.   Musculoskeletal:         General: No tenderness. Normal range of  "motion.      Cervical back: Neck supple. No tenderness.   Skin:     General: Skin is warm and dry.      Capillary Refill: Capillary refill takes less than 2 seconds.   Neurological:      General: No focal deficit present.      Mental Status: He is alert and oriented to person, place, and time.   Psychiatric:      Comments: Denies SI/HI at time of my evaluation    However, patient endorsed SI/HI to RN staff           Laboratory:  Recent Labs     12/27/23  1443   WBC 4.8   RBC 2.48*   HEMOGLOBIN 8.8*   HEMATOCRIT 26.7*   .7*   MCH 35.5*   MCHC 33.0   RDW 76.7*   PLATELETCT 111*   MPV 9.5     Recent Labs     12/27/23  1443   SODIUM 136   POTASSIUM 4.1   CHLORIDE 102   CO2 25   GLUCOSE 100*   BUN 10   CREATININE 0.66   CALCIUM 9.7     Recent Labs     12/27/23  1443   ALTSGPT 18   ASTSGOT 19   ALKPHOSPHAT 85   TBILIRUBIN 0.5   GLUCOSE 100*         No results for input(s): \"NTPROBNP\" in the last 72 hours.      No results for input(s): \"TROPONINT\" in the last 72 hours.    Imaging:  [id number] WITH   Final Result      1.  Bilateral cataract surgery.   2.  Bilateral periorbital soft tissue swelling, preseptal. Findings most consistent with periorbital cellulitis. No kassandra abscess pocket is evident at this time.   3.  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 Inflammatory orbital pseudotumor would not be excluded.   4.  Dehiscent nasal septum. This can be associated with chronic use of vasoactive nasal spray or other sympathomimetic agents, vasculitis (as in Wegener's), or may represent postoperative change or posttraumatic septal necrosis (consequence of septal    hematoma).          no X-Ray or EKG requiring interpretation    Assessment/Plan:  Justification for Admission Status  I anticipate this patient will require at least 2 midnights hospitalization, therefore appropriate for inpatient status.        * Cellulitis of periorbital region of both eyes- (present on " "admission)  Assessment & Plan  CT orbits noted bilateral periorbital cellulitis IVF  Antibiotic: Unasyn  Follow cultures  May consider ophthalmology consult if no improvement in next 24-48hours    Suicidal ideations  Assessment & Plan  Denies SI/HI at time of admission evaluation    However reported to nursing staff that \" about a month ago, I had suicidal thoughts and could have kill myself, I own guns.  I gave my guns away, I to my wife, I to my friends and they helped me.\"    Legal hold order placed  Psychiatry evaluation in a.m.    COPD (chronic obstructive pulmonary disease) (HCC)- (present on admission)  Assessment & Plan  Not in acute exacerbation  Home meds    Hypothyroid- (present on admission)  Assessment & Plan  Discontinue laceration due to elevated T4  Repeat TSH and T4 in AM, resume if appropriate    Pancytopenia (HCC)- (present on admission)  Assessment & Plan  Chronic    ACP (advance care planning)  Assessment & Plan  Goal of care discussed with patient ER.  He is agreeable for noninvasive, as well as invasive/.  Life-sustaining measures-including CPR/defibrillation/intubation or mechanical ventilation as needed.  Diagnosis, prognosis, questions and concern addressed.  Full CODE STATUS confirmed.  ACP: 16 minutes        VTE prophylaxis: SCDs/TEDs  "

## 2023-12-28 NOTE — PROGRESS NOTES
"Pt. Answered \"yes\" to five out of six questions on the [place] Suicide Severity Rating Scale.  Pt. Stated \"About a month ago I was having suicidal thoughts.  I owned guns. I had a plan. I could have killed myself.  My body doesn't work like it used to.\"  \"I told my wife, I got rid of the guns, I told my friends and they helped me.\"    Notified charge RN and admitting MD Dr. Kael Mckeon.  Legal hold placed at 2237.    Pt. Placed in private room, 1:1 sitter in place, all objects with potential to cause harm removed from room, Pt. Belongings checked for harmful objects, none present, pt. Belongings kept in floor safekeeping, nutrition notified for only plastic and paper ware.  Pt. Strict NPO at this time due to failed dysphagia screen, SLP eval in place.     Pt. Notified of legal hold, pt. Frustrated stating \"I said one thing and look at what happens.\"  Pt. Educated on need for legal hold for his safety.  Pt. Still frustrated but agreeable.    "

## 2023-12-28 NOTE — ED NOTES
Rounded on pt. Pt resting comfortably in bed. Bed locked and in lowest position. Respirations equal and unlabored on room air. No further needs at this time.

## 2023-12-29 ENCOUNTER — APPOINTMENT (OUTPATIENT)
Dept: RADIOLOGY | Facility: MEDICAL CENTER | Age: 78
DRG: 602 | End: 2023-12-29
Attending: INTERNAL MEDICINE
Payer: COMMERCIAL

## 2023-12-29 LAB
ALBUMIN SERPL BCP-MCNC: 2.9 G/DL (ref 3.2–4.9)
BASOPHILS # BLD AUTO: 0.4 % (ref 0–1.8)
BASOPHILS # BLD: 0.01 K/UL (ref 0–0.12)
BUN SERPL-MCNC: 13 MG/DL (ref 8–22)
CALCIUM ALBUM COR SERPL-MCNC: 10 MG/DL (ref 8.5–10.5)
CALCIUM SERPL-MCNC: 9.1 MG/DL (ref 8.5–10.5)
CHLORIDE SERPL-SCNC: 106 MMOL/L (ref 96–112)
CO2 SERPL-SCNC: 24 MMOL/L (ref 20–33)
CREAT SERPL-MCNC: 0.67 MG/DL (ref 0.5–1.4)
EOSINOPHIL # BLD AUTO: 0.04 K/UL (ref 0–0.51)
EOSINOPHIL NFR BLD: 1.5 % (ref 0–6.9)
ERYTHROCYTE [DISTWIDTH] IN BLOOD BY AUTOMATED COUNT: 77.7 FL (ref 35.9–50)
GFR SERPLBLD CREATININE-BSD FMLA CKD-EPI: 96 ML/MIN/1.73 M 2
GLUCOSE SERPL-MCNC: 86 MG/DL (ref 65–99)
HCT VFR BLD AUTO: 26.1 % (ref 42–52)
HGB BLD-MCNC: 8.1 G/DL (ref 14–18)
IMM GRANULOCYTES # BLD AUTO: 0.03 K/UL (ref 0–0.11)
IMM GRANULOCYTES NFR BLD AUTO: 1.2 % (ref 0–0.9)
LYMPHOCYTES # BLD AUTO: 0.32 K/UL (ref 1–4.8)
LYMPHOCYTES NFR BLD: 12.3 % (ref 22–41)
MCH RBC QN AUTO: 34.3 PG (ref 27–33)
MCHC RBC AUTO-ENTMCNC: 31 G/DL (ref 32.3–36.5)
MCV RBC AUTO: 110.6 FL (ref 81.4–97.8)
MONOCYTES # BLD AUTO: 0.24 K/UL (ref 0–0.85)
MONOCYTES NFR BLD AUTO: 9.2 % (ref 0–13.4)
NEUTROPHILS # BLD AUTO: 1.96 K/UL (ref 1.82–7.42)
NEUTROPHILS NFR BLD: 75.4 % (ref 44–72)
NRBC # BLD AUTO: 0 K/UL
NRBC BLD-RTO: 0 /100 WBC (ref 0–0.2)
PHOSPHATE SERPL-MCNC: 2.8 MG/DL (ref 2.5–4.5)
PLATELET # BLD AUTO: 103 K/UL (ref 164–446)
PMV BLD AUTO: 9.5 FL (ref 9–12.9)
POTASSIUM SERPL-SCNC: 3.5 MMOL/L (ref 3.6–5.5)
RBC # BLD AUTO: 2.36 M/UL (ref 4.7–6.1)
SODIUM SERPL-SCNC: 139 MMOL/L (ref 135–145)
WBC # BLD AUTO: 2.6 K/UL (ref 4.8–10.8)

## 2023-12-29 PROCEDURE — 76937 US GUIDE VASCULAR ACCESS: CPT

## 2023-12-29 PROCEDURE — 700111 HCHG RX REV CODE 636 W/ 250 OVERRIDE (IP): Performed by: INTERNAL MEDICINE

## 2023-12-29 PROCEDURE — C1751 CATH, INF, PER/CENT/MIDLINE: HCPCS

## 2023-12-29 PROCEDURE — 770001 HCHG ROOM/CARE - MED/SURG/GYN PRIV*

## 2023-12-29 PROCEDURE — 700111 HCHG RX REV CODE 636 W/ 250 OVERRIDE (IP): Mod: JZ | Performed by: STUDENT IN AN ORGANIZED HEALTH CARE EDUCATION/TRAINING PROGRAM

## 2023-12-29 PROCEDURE — 700105 HCHG RX REV CODE 258: Performed by: INTERNAL MEDICINE

## 2023-12-29 PROCEDURE — 85025 COMPLETE CBC W/AUTO DIFF WBC: CPT

## 2023-12-29 PROCEDURE — A9270 NON-COVERED ITEM OR SERVICE: HCPCS | Performed by: INTERNAL MEDICINE

## 2023-12-29 PROCEDURE — RXMED WILLOW AMBULATORY MEDICATION CHARGE: Performed by: INTERNAL MEDICINE

## 2023-12-29 PROCEDURE — 700102 HCHG RX REV CODE 250 W/ 637 OVERRIDE(OP): Performed by: INTERNAL MEDICINE

## 2023-12-29 PROCEDURE — A9270 NON-COVERED ITEM OR SERVICE: HCPCS | Performed by: STUDENT IN AN ORGANIZED HEALTH CARE EDUCATION/TRAINING PROGRAM

## 2023-12-29 PROCEDURE — 99232 SBSQ HOSP IP/OBS MODERATE 35: CPT | Performed by: INTERNAL MEDICINE

## 2023-12-29 PROCEDURE — 700102 HCHG RX REV CODE 250 W/ 637 OVERRIDE(OP): Performed by: STUDENT IN AN ORGANIZED HEALTH CARE EDUCATION/TRAINING PROGRAM

## 2023-12-29 PROCEDURE — 80069 RENAL FUNCTION PANEL: CPT

## 2023-12-29 PROCEDURE — 700105 HCHG RX REV CODE 258: Performed by: STUDENT IN AN ORGANIZED HEALTH CARE EDUCATION/TRAINING PROGRAM

## 2023-12-29 RX ORDER — LEVOTHYROXINE SODIUM 0.15 MG/1
150 TABLET ORAL
Qty: 30 TABLET | Refills: 0 | Status: SHIPPED | OUTPATIENT
Start: 2023-12-30

## 2023-12-29 RX ORDER — AMOXICILLIN AND CLAVULANATE POTASSIUM 875; 125 MG/1; MG/1
1 TABLET, FILM COATED ORAL 2 TIMES DAILY
Qty: 16 TABLET | Refills: 0 | Status: ACTIVE | OUTPATIENT
Start: 2023-12-29 | End: 2024-01-07

## 2023-12-29 RX ORDER — METHYLPREDNISOLONE 4 MG/1
TABLET ORAL
Qty: 21 TABLET | Refills: 0 | Status: SHIPPED | OUTPATIENT
Start: 2023-12-29

## 2023-12-29 RX ORDER — METHYLPREDNISOLONE 4 MG/1
TABLET ORAL
Qty: 21 TABLET | Refills: 0 | Status: SHIPPED | OUTPATIENT
Start: 2023-12-29 | End: 2023-12-29

## 2023-12-29 RX ORDER — POTASSIUM CHLORIDE 20 MEQ/1
20 TABLET, EXTENDED RELEASE ORAL ONCE
Status: COMPLETED | OUTPATIENT
Start: 2023-12-29 | End: 2023-12-29

## 2023-12-29 RX ORDER — LEVOTHYROXINE SODIUM 0.15 MG/1
150 TABLET ORAL
Qty: 30 TABLET | Refills: 0 | Status: SHIPPED | OUTPATIENT
Start: 2023-12-30 | End: 2023-12-29

## 2023-12-29 RX ADMIN — ACETAMINOPHEN 650 MG: 325 TABLET, FILM COATED ORAL at 04:38

## 2023-12-29 RX ADMIN — LEVOTHYROXINE SODIUM 150 MCG: 0.15 TABLET ORAL at 04:39

## 2023-12-29 RX ADMIN — SODIUM CHLORIDE, POTASSIUM CHLORIDE, SODIUM LACTATE AND CALCIUM CHLORIDE: 600; 310; 30; 20 INJECTION, SOLUTION INTRAVENOUS at 23:14

## 2023-12-29 RX ADMIN — ATORVASTATIN CALCIUM 80 MG: 40 TABLET, FILM COATED ORAL at 21:31

## 2023-12-29 RX ADMIN — ACETAMINOPHEN 650 MG: 325 TABLET, FILM COATED ORAL at 23:52

## 2023-12-29 RX ADMIN — AMPICILLIN AND SULBACTAM 3 G: 1; 2 INJECTION, POWDER, FOR SOLUTION INTRAMUSCULAR; INTRAVENOUS at 23:55

## 2023-12-29 RX ADMIN — TAMSULOSIN HYDROCHLORIDE 0.4 MG: 0.4 CAPSULE ORAL at 04:40

## 2023-12-29 RX ADMIN — AMPICILLIN AND SULBACTAM 3 G: 1; 2 INJECTION, POWDER, FOR SOLUTION INTRAMUSCULAR; INTRAVENOUS at 16:23

## 2023-12-29 RX ADMIN — METHYLPREDNISOLONE SODIUM SUCCINATE 1000 MG: 1 INJECTION, POWDER, LYOPHILIZED, FOR SOLUTION INTRAMUSCULAR; INTRAVENOUS at 09:59

## 2023-12-29 RX ADMIN — ACETAMINOPHEN 650 MG: 325 TABLET, FILM COATED ORAL at 16:23

## 2023-12-29 RX ADMIN — ASPIRIN 81 MG: 81 TABLET, CHEWABLE ORAL at 04:40

## 2023-12-29 RX ADMIN — AMPICILLIN AND SULBACTAM 3 G: 1; 2 INJECTION, POWDER, FOR SOLUTION INTRAMUSCULAR; INTRAVENOUS at 12:28

## 2023-12-29 RX ADMIN — ISOSORBIDE MONONITRATE 60 MG: 30 TABLET, EXTENDED RELEASE ORAL at 04:39

## 2023-12-29 RX ADMIN — POTASSIUM CHLORIDE 20 MEQ: 1500 TABLET, EXTENDED RELEASE ORAL at 09:12

## 2023-12-29 RX ADMIN — CETIRIZINE HYDROCHLORIDE 10 MG: 10 TABLET, FILM COATED ORAL at 04:39

## 2023-12-29 RX ADMIN — ACETAMINOPHEN 650 MG: 325 TABLET, FILM COATED ORAL at 12:28

## 2023-12-29 RX ADMIN — PREDNISONE 5 MG: 5 TABLET ORAL at 04:40

## 2023-12-29 ASSESSMENT — PAIN DESCRIPTION - PAIN TYPE: TYPE: ACUTE PAIN

## 2023-12-29 ASSESSMENT — ENCOUNTER SYMPTOMS
ABDOMINAL PAIN: 0
BLURRED VISION: 1
EYE PAIN: 1
SHORTNESS OF BREATH: 0

## 2023-12-29 NOTE — CARE PLAN
"The patient is Stable - Low risk of patient condition declining or worsening    Shift Goals  Clinical Goals: to have speech consult so patient can eat and to have psyc eval to determine if legal hold and sitter is needed by end of shift  Patient Goals: to know the plan and not feel like a \"prisoner\"  Family Goals: TARA    Progress made toward(s) clinical / shift goals:  Patient speech eval completed, able to eat with precautions in place, reported to night shift.     Problem: Provide Safe Environment  Goal: Suicide environmental safety, protocols, policies, and practices will be implemented this shift  Outcome: Met  Safe environment provided for patient. Legal hold and sitter discontinued     Problem: Psychosocial  Goal: Patient's ability to identify and utilize available support systems will improve this shift  Outcome: Progressing   Patient verbalizing support system, through wife and friends.     Problem: Pain - Standard  Goal: Alleviation of pain or a reduction in pain to the patient’s comfort goal this shift  Outcome: Progressing   Patient reports pain controled with acetaminophen, for eyes    Patient is not progressing towards the following goals:      "

## 2023-12-29 NOTE — PROGRESS NOTES
Hospital Medicine Daily Progress Note    Date of Service  12/29/2023    Chief Complaint  King Alegria is a 78 y.o. male admitted 12/27/2023 with periorbital swelling    Hospital Course  77 yo man with CAD, COPD, rheumatoid arthritis, HTN, HLD, MDS/aplastic anemia [facility] who presented with bilateral eye swelling and pain.  Patient was recently hospitalized for left orbital cellulitis.  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  CT orbits showed bilateral periorbital soft tissue swelling preseptal, no abscess, enlargement of extraocular muscles suggestive of disc thyroid ophthalmology versus inflammatory orbital pseudotumor, dehiscent nasal septum, evidence of bilateral cataract surgery.  He was started on IV Unasyn.  Patient had reported to the nurse that patient had suicidal thoughts, thought about shooting himself with his guns and he was placed on a legal hold.  Psychiatry was consulted.    Interval Problem Update  HypoK 3.5, replete  His PIV came out last night and was a difficult placement  I ordered for midline for patient to discharge home with given he will likely continue to have difficulty getting IV placed at infusion center  His eye swelling and pain is better today, still some blurry vision present.  I ordered for 1 g Solu-Medrol  I spoke with his wife who says the patient had ran out of his Synthroid  [facility]    I have discussed this patient's plan of care and discharge plan at IDT rounds today with Case Management, Nursing, Nursing leadership, and other members of the IDT team.    Consultants/Specialty  Psychiatry    Code Status  Full Code    Disposition  The patient is not medically cleared for discharge to home or a post-acute facility.  Anticipate discharge to: home with organized home healthcare and close outpatient  follow-up    I have placed the appropriate orders for post-discharge needs.    Review of Systems  Review of Systems   Constitutional:  Negative for malaise/fatigue.   Eyes:  Positive for blurred vision and pain (Improved).   Respiratory:  Negative for shortness of breath.    Cardiovascular:  Negative for chest pain.   Gastrointestinal:  Negative for abdominal pain.        Physical Exam  Temp:  [36.2 °C (97.1 °F)-37.1 °C (98.7 °F)] 36.3 °C (97.4 °F)  Pulse:  [67-72] 67  Resp:  [16-17] 17  BP: (101-119)/(49-64) 101/51  SpO2:  [93 %-96 %] 96 %    Physical Exam  Vitals and nursing note reviewed.   Constitutional:       Comments: Frail, thin   HENT:      Head: Normocephalic.      Mouth/Throat:      Mouth: Mucous membranes are dry.   Eyes:      General:         Right eye: No discharge.         Left eye: No discharge.      Comments: Periorbital erythema, edema is decreased   Cardiovascular:      Rate and Rhythm: Normal rate and regular rhythm.   Pulmonary:      Effort: Pulmonary effort is normal. No respiratory distress.      Breath sounds: No wheezing or rales.   Abdominal:      Palpations: Abdomen is soft.      Tenderness: There is no abdominal tenderness.   Musculoskeletal:         General: No swelling.      Cervical back: Neck supple.      Comments: Diffuse muscle atrophy   Skin:     General: Skin is warm and dry.   Neurological:      Mental Status: He is alert and oriented to person, place, and time.         Fluids    Intake/Output Summary (Last 24 hours) at 12/29/2023 1341  Last data filed at 12/29/2023 1000  Gross per 24 hour   Intake 840 ml   Output 250 ml   Net 590 ml       Laboratory  Recent Labs     12/27/23  1443 12/29/23  0301   WBC 4.8 2.6*   RBC 2.48* 2.36*   HEMOGLOBIN 8.8* 8.1*   HEMATOCRIT 26.7* 26.1*   .7* 110.6*   MCH 35.5* 34.3*   MCHC 33.0 31.0*   RDW 76.7* 77.7*   PLATELETCT 111* 103*   MPV 9.5 9.5     Recent Labs     12/27/23  1443 12/29/23  0301   SODIUM 136 139   POTASSIUM 4.1 3.5*    CHLORIDE 102 106   CO2 25 24   GLUCOSE 100* 86   BUN 10 13   CREATININE 0.66 0.67   CALCIUM 9.7 9.1                   Imaging  IR-MIDLINE CATHETER INSERTION WO GUIDANCE > AGE 3   Final Result                  Ultrasound-guided midline placement performed by qualified nursing staff    as above.          [id number] WITH   Final Result      1.  Bilateral cataract surgery.   2.  Bilateral periorbital soft tissue swelling, preseptal. Findings most consistent with periorbital cellulitis. No kassandra abscess pocket is evident at this time.   3.  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 Inflammatory orbital pseudotumor would not be excluded.   4.  Dehiscent nasal septum. This can be associated with chronic use of vasoactive nasal spray or other sympathomimetic agents, vasculitis (as in Wegener's), or may represent postoperative change or posttraumatic septal necrosis (consequence of septal    hematoma).           Assessment/Plan  * Cellulitis of periorbital region of both eyes- (present on admission)  Assessment & Plan  Patient has been seen by Dr. Ariza I spoke with his office  Periorbital swelling is due to thyroid disease.  [facility]  Patient has been tapered to prednisone 5 mg daily and needs Teppezza infusions which she was not able to start because of difficulty placing a PIV at [facility].  Case management arranging outpatient infusion  I placed a midline, order for home health IV care  Continue Unasyn for now  Still has eye swelling and erythema and pain, dose of 1 g Solu-Medrol today  I discussed with the patient and wife to follow-up with Dr. Ariza closely next week    Suicidal ideations  Assessment & Plan  No longer suicidal, psychiatry was consulted and legal hold was discontinued    ACP (advance care planning)  Assessment & Plan  Full code    COPD (chronic obstructive pulmonary disease) (HCC)- (present on  admission)  Assessment & Plan  Not in acute exacerbation  Home meds    Hypothyroid- (present on admission)  Assessment & Plan  TSH was normal, T3 and T4 were slightly low.  Patient ran out of his medication  Continue on Synthroid, refill ordered  Trying to arrange for Tepezza infusion    Pancytopenia (HCC)- (present on admission)  Assessment & Plan  Chronic         VTE prophylaxis:   SCDs/TEDs      I have performed a physical exam and reviewed and updated ROS and Plan today (12/29/2023). In review of yesterday's note (12/28/2023), there are no changes except as documented above.

## 2023-12-29 NOTE — DOCUMENTATION QUERY
[facility]                                                                       Query Response Note      PATIENT:               DOUGLAS KHAN  ACCT #:                  [id number]  MRN:                     [id number]  :                      1945  ADMIT DATE:       2023 12:49 PM  DISCH DATE:          RESPONDING  PROVIDER #:        [id number]           QUERY TEXT:    Per RD consult , patient meets ASPEN criteria for severe acute malnutrition. Based on the above findings and clinical indicators, can a diagnosis be provided?    The patient's clinical indicators include:  Findings:  --Patient admitted for cellulitis of periorbital region of both eyes and suicidal ideations  --Per MD query response , ''underweight'' is documented  --Per PN  Constitutional:  Frail, thin  --BMI:  18.25, weight:  54.4kg, height:  1.727m per Epic chart review  --Per RD consult , ''Consult received for MST score of 5'' documented  --Per RD consult , ''Physical assessment:  Pt with signs of muscle and fat wasting evidenced by sunken      temples, large black/red circles under eyes, thin skin on back of hand, squared shoulder, protruding      clavicles, thin skin around neck area, and lacking definition or prominence in interosseous muscle on hand.      Additionally patient reports that most of his clothing is too large for him at this time and he has noticed his      body is smaller'' documented  --Per RD consult , ''Patient appears to meet ASPEN criteria for severe acute malnutrition related to      multiple physical findings of muscle and fat wasting accompanied with severely decreased oral intake in the      last week, estimated to be less than normal of his baseline. Weight loss of 2.6% in the last 3 weeks not severe      but is worth noting'' documented    Treatment:  --RD consult  --Magic cups tid, cottage cheese  1x/day  --Monitor weight  --Level 6 diet, soft and bite sized with thin liquids, cardiac diet    Risk Factors:  --COPD  --PMHX prostate CA  --Weight loss of 2.6% in the last 3 weeks  --Muscle and fat wasting present per RD consult 12/28    Thank you,  Eladia House RN, BSN  Clinical   Connect via [employer]  Options provided:   -- Patient has severe protein-calorie malnutrition   -- Patient has moderate protein-calorie malnutrition   -- Patient has mild protein-calorie malnutrition   -- Other explanation, please specify   -- Unable to determine      Query created by: Eladia House on 12/29/2023 10:52 AM    RESPONSE TEXT:    Patient has severe protein-calorie malnutrition          Electronically signed by:  GAVIN TAVERAS MD 12/29/2023 11:28 AM

## 2023-12-29 NOTE — CARE PLAN
The patient is Stable - Low risk of patient condition declining or worsening    Shift Goals  Clinical Goals: pt will rest for most of shift  Patient Goals: sleep through the night  Family Goals: TARA    Progress made toward(s) clinical / shift goals:    Pt was able to rest throughout majority of shift. Pt AO x4.  Pt woke up from nightmare and removed PIV access.  Unable to obtain new access via ultrasound x2 attempts. Call light, belongings, and wastebasket within reach.  Hourly rounding in place.  Bed locked and in lowest position.       Problem: Knowledge Deficit - Standard  Goal: Patient and family/care givers will demonstrate understanding of plan of care, disease process/condition, diagnostic tests and medications  Outcome: Progressing

## 2023-12-29 NOTE — CARE PLAN
The patient is Watcher - Medium risk of patient condition declining or worsening    Shift Goals  Clinical Goals: rest  Patient Goals: sleep through the night  Family Goals: TARA    Progress made toward(s) clinical / shift goals:  pt states reduced swelling and increased vision.       Problem: Knowledge Deficit - Standard  Goal: Patient and family/care givers will demonstrate understanding of plan of care, disease process/condition, diagnostic tests and medications  Outcome: Progressing  Note: Pt educated on current POC, expected outcomes and goals, and new medications ordered. All questions and concerns have been answered at this time. MD educated pt on disease pathology and work up.        Problem: Pain - Standard  Goal: Alleviation of pain or a reduction in pain to the patient’s comfort goal  Outcome: Progressing  Note: Current pain regimen effective on getting patients pain level under control as needed, per patient. Educated on alternative pain relief therapies such as music, games, heat/cold, TV as distraction, walking in the halls or in room, and controlled breathing techniques.

## 2023-12-29 NOTE — PROGRESS NOTES
Pt removed PIV access.  Ultrasound guided attempted twice to obtain new access with no success.  Suggested midline.

## 2023-12-29 NOTE — DISCHARGE PLANNING
Planning on discharging pt to home with home health and a midline for iv access for the infusions that he needs for his disease.    Choice form sent to [facility] for Home Health.

## 2023-12-29 NOTE — PROCEDURES
Vascular Access Team    Date of Insertion: December 29, 2023  Arm Circumference: 24.5 cm  Internal length: 16 cm  External Length: 0 cm  Vein Occupancy %: 37%  Reason for Midline: Outpatient infusion  Labs: WBC 2.6 , , BUN 13, Cr 0.67, GFR 96 , INR n/a    Orders confirmed, vessel patency confirmed with ultrasound. Risks and benefits of procedure explained to patient and education regarding line associated bloodstream infections provided. Questions answered.     Power Midline placed in LUE per licensed provider order with ultrasound guidance. 4  Fr, single lumen Power Midline placed in brachial vein after 1 attempt(s). 2 mL of 1% lidocaine injected intradermally, 21 gauge microintroducer needle was visualized entering the vein and modified Seldinger technique used. 16 cm catheter inserted with good blood return. Secured at 0 cm marker. Internal positioning stylet removed and verified to be intact. Each lumen flushed without resistance with 10 mL 0.9% normal saline. Midline secured with Biopatch and Tegaderm.     Midline placement is confirmed by nurse using ultrasound and ability to flush and draw blood. Midline is appropriate for use at this time.  Patient tolerated procedure well, without complications.  No X-ray is needed for placement confirmation.  Patient condition relayed to unit RN or ordering physician via this post procedure note in the EMR.     Ultrasound images uploaded to PACS and viewable in the EMR - Yes  Ultrasound imaged printed and placed in paper chart - No     BARD Power Midline ref # [id number], Lot # [id number], Expiration Date February 28, 2025

## 2023-12-29 NOTE — DISCHARGE PLANNING
Case Management Discharge Planning    Admission Date: 12/27/2023  GMLOS: 3.2  ALOS: 1    6-Clicks ADL Score:    6-Clicks Mobility Score:        Anticipated Discharge Dispo:      DME Needed: No    Action(s) Taken: Updated Provider/Nurse on Discharge Plan    Escalations Completed: None    Medically Clear: Yes    Next Steps: see below    Barriers to Discharge: Medical clearance    Is the patient up for discharge tomorrow: Yes    Is transport arranged for discharge disposition: No      RNCM called Dr Ariza's office opthalmologist at [facility]  and pt is scheduled for the infusion on Jan 9th at 0830 at [address].    Legal hold released at 1014 today.      Care Transition Team Assessment    Information Source  Orientation Level: Oriented X4  Information Given By: Patient  Who is responsible for making decisions for patient? : Patient    Readmission Evaluation  Is this a readmission?: Yes - unplanned readmission  Why do you think you were readmitted?: my eyes are messing up again  Was an appointment arranged for you prior to discharge?: No  Were there new prescriptions you were supposed to fill after you were discharged?: No  Did you have enough support after your last discharge?: Yes    Elopement Risk  Legal Hold: No  Ambulatory or Self Mobile in Wheelchair: Yes  Disoriented: No  Psychiatric Symptoms: None  History of Wandering: No  Elopement this Admit: No  Vocalizing Wanting to Leave: No  Displays Behaviors, Body Language Wanting to Leave: No-Not at Risk for Elopement  Time of Legal Hold: 2237  Date of Legal Hold: 12/27/23  End Time of Legal Hold - SW to complete: 1014  End Date of Legal Hold - SW to complete: 12/28/23  Elopement Risk: Not at Risk for Elopement  Wanderguard On: No (See Comments)  Personal Belongings: Hospital Clothing Only  Environmental Precautions: Sharp or Dangerous Items Removed    Interdisciplinary Discharge Planning  Primary Care Physician: Dr. Ariza  Lives with -  Patient's Self Care Capacity: Spouse  Patient or legal guardian wants to designate a caregiver: No  Support Systems: Spouse / Significant Other  Housing / Facility: 1 Story House  Do You Take your Prescribed Medications Regularly: Yes  Able to Return to Previous ADL's: Yes  Mobility Issues: No  Prior Services: None  Patient Prefers to be Discharged to:: home  Assistance Needed: No  Durable Medical Equipment: Home Oxygen  [facility]    Discharge Preparedness  What is your plan after discharge?: Home with help  What are your discharge supports?: Spouse  Prior Functional Level: Needs Assist with Medication Management  Difficulity with ADLs: None    Functional Assesment  Prior Functional Level: Needs Assist with Medication Management    Finances  Financial Barriers to Discharge: No  Prescription Coverage: Yes    Vision / Hearing Impairment  Vision Impairment : Yes  Right Eye Vision: Impaired, Wears Glasses  Left Eye Vision: Impaired, Wears Glasses  Hearing Impairment : No         Advance Directive  Advance Directive?: None  Advance Directive offered?: AD Booklet refused    Domestic Abuse  Have you ever been the victim of abuse or violence?: No  Physical Abuse or Sexual Abuse: No  Verbal Abuse or Emotional Abuse: No  Possible Abuse/Neglect Reported to:: Not Applicable    Psychological Assessment  History of Substance Abuse: None  History of Psychiatric Problems: No  Non-compliant with Treatment: No  Newly Diagnosed Illness: No    Discharge Risks or Barriers  Discharge risks or barriers?: No    Anticipated Discharge Information  Discharge Disposition: Discharged to home/self care (01)

## 2023-12-30 ENCOUNTER — PHARMACY VISIT (OUTPATIENT)
Dept: PHARMACY | Facility: MEDICAL CENTER | Age: 78
End: 2023-12-30
Payer: COMMERCIAL

## 2023-12-30 VITALS
BODY MASS INDEX: 18.19 KG/M2 | TEMPERATURE: 98 F | HEART RATE: 60 BPM | SYSTOLIC BLOOD PRESSURE: 128 MMHG | HEIGHT: 68 IN | OXYGEN SATURATION: 96 % | DIASTOLIC BLOOD PRESSURE: 59 MMHG | RESPIRATION RATE: 17 BRPM | WEIGHT: 120 LBS

## 2023-12-30 LAB
ALBUMIN SERPL BCP-MCNC: 3 G/DL (ref 3.2–4.9)
BASOPHILS # BLD AUTO: 0 % (ref 0–1.8)
BASOPHILS # BLD: 0 K/UL (ref 0–0.12)
BUN SERPL-MCNC: 18 MG/DL (ref 8–22)
CALCIUM ALBUM COR SERPL-MCNC: 9.9 MG/DL (ref 8.5–10.5)
CALCIUM SERPL-MCNC: 9.1 MG/DL (ref 8.5–10.5)
CHLORIDE SERPL-SCNC: 104 MMOL/L (ref 96–112)
CO2 SERPL-SCNC: 24 MMOL/L (ref 20–33)
CREAT SERPL-MCNC: 0.63 MG/DL (ref 0.5–1.4)
EOSINOPHIL # BLD AUTO: 0 K/UL (ref 0–0.51)
EOSINOPHIL NFR BLD: 0 % (ref 0–6.9)
ERYTHROCYTE [DISTWIDTH] IN BLOOD BY AUTOMATED COUNT: 74 FL (ref 35.9–50)
GFR SERPLBLD CREATININE-BSD FMLA CKD-EPI: 98 ML/MIN/1.73 M 2
GLUCOSE SERPL-MCNC: 109 MG/DL (ref 65–99)
HCT VFR BLD AUTO: 24.7 % (ref 42–52)
HGB BLD-MCNC: 7.9 G/DL (ref 14–18)
IMM GRANULOCYTES # BLD AUTO: 0.07 K/UL (ref 0–0.11)
IMM GRANULOCYTES NFR BLD AUTO: 1 % (ref 0–0.9)
LYMPHOCYTES # BLD AUTO: 0.4 K/UL (ref 1–4.8)
LYMPHOCYTES NFR BLD: 5.7 % (ref 22–41)
MCH RBC QN AUTO: 35.1 PG (ref 27–33)
MCHC RBC AUTO-ENTMCNC: 32 G/DL (ref 32.3–36.5)
MCV RBC AUTO: 109.8 FL (ref 81.4–97.8)
MONOCYTES # BLD AUTO: 0.54 K/UL (ref 0–0.85)
MONOCYTES NFR BLD AUTO: 7.7 % (ref 0–13.4)
NEUTROPHILS # BLD AUTO: 6 K/UL (ref 1.82–7.42)
NEUTROPHILS NFR BLD: 85.6 % (ref 44–72)
NRBC # BLD AUTO: 0 K/UL
NRBC BLD-RTO: 0 /100 WBC (ref 0–0.2)
PHOSPHATE SERPL-MCNC: 2.6 MG/DL (ref 2.5–4.5)
PLATELET # BLD AUTO: 123 K/UL (ref 164–446)
PMV BLD AUTO: 9.7 FL (ref 9–12.9)
POTASSIUM SERPL-SCNC: 4.8 MMOL/L (ref 3.6–5.5)
RBC # BLD AUTO: 2.25 M/UL (ref 4.7–6.1)
SODIUM SERPL-SCNC: 135 MMOL/L (ref 135–145)
WBC # BLD AUTO: 7 K/UL (ref 4.8–10.8)

## 2023-12-30 PROCEDURE — 85025 COMPLETE CBC W/AUTO DIFF WBC: CPT

## 2023-12-30 PROCEDURE — A9270 NON-COVERED ITEM OR SERVICE: HCPCS | Performed by: INTERNAL MEDICINE

## 2023-12-30 PROCEDURE — 700102 HCHG RX REV CODE 250 W/ 637 OVERRIDE(OP): Performed by: INTERNAL MEDICINE

## 2023-12-30 PROCEDURE — A9270 NON-COVERED ITEM OR SERVICE: HCPCS | Performed by: STUDENT IN AN ORGANIZED HEALTH CARE EDUCATION/TRAINING PROGRAM

## 2023-12-30 PROCEDURE — 700102 HCHG RX REV CODE 250 W/ 637 OVERRIDE(OP): Performed by: STUDENT IN AN ORGANIZED HEALTH CARE EDUCATION/TRAINING PROGRAM

## 2023-12-30 PROCEDURE — 700105 HCHG RX REV CODE 258: Performed by: INTERNAL MEDICINE

## 2023-12-30 PROCEDURE — 700111 HCHG RX REV CODE 636 W/ 250 OVERRIDE (IP): Performed by: INTERNAL MEDICINE

## 2023-12-30 PROCEDURE — 700105 HCHG RX REV CODE 258: Performed by: STUDENT IN AN ORGANIZED HEALTH CARE EDUCATION/TRAINING PROGRAM

## 2023-12-30 PROCEDURE — 700111 HCHG RX REV CODE 636 W/ 250 OVERRIDE (IP): Mod: JZ | Performed by: STUDENT IN AN ORGANIZED HEALTH CARE EDUCATION/TRAINING PROGRAM

## 2023-12-30 PROCEDURE — 99239 HOSP IP/OBS DSCHRG MGMT >30: CPT | Performed by: INTERNAL MEDICINE

## 2023-12-30 PROCEDURE — 80069 RENAL FUNCTION PANEL: CPT

## 2023-12-30 RX ADMIN — AMPICILLIN AND SULBACTAM 3 G: 1; 2 INJECTION, POWDER, FOR SOLUTION INTRAMUSCULAR; INTRAVENOUS at 05:58

## 2023-12-30 RX ADMIN — CETIRIZINE HYDROCHLORIDE 10 MG: 10 TABLET, FILM COATED ORAL at 05:59

## 2023-12-30 RX ADMIN — ASPIRIN 81 MG: 81 TABLET, CHEWABLE ORAL at 05:59

## 2023-12-30 RX ADMIN — TAMSULOSIN HYDROCHLORIDE 0.4 MG: 0.4 CAPSULE ORAL at 05:59

## 2023-12-30 RX ADMIN — ACETAMINOPHEN 650 MG: 325 TABLET, FILM COATED ORAL at 05:58

## 2023-12-30 RX ADMIN — METHYLPREDNISOLONE SODIUM SUCCINATE 1000 MG: 1 INJECTION, POWDER, LYOPHILIZED, FOR SOLUTION INTRAMUSCULAR; INTRAVENOUS at 06:34

## 2023-12-30 RX ADMIN — ISOSORBIDE MONONITRATE 60 MG: 30 TABLET, EXTENDED RELEASE ORAL at 05:59

## 2023-12-30 RX ADMIN — LEVOTHYROXINE SODIUM 150 MCG: 0.15 TABLET ORAL at 05:59

## 2023-12-30 NOTE — DISCHARGE INSTRUCTIONS
Discharge Instructions per Donny Patel M.D.    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  You need to continue your thyroid medication Synthroid which I have refilled.  You will take a course of steroids and antibiotics and have been scheduled for your outpatient infusion on January 9 that Dr. Ariza had ordered.  Please call Dr. Ariza's office and follow-up with him in the next week.

## 2023-12-30 NOTE — CARE PLAN
The patient is Stable - Low risk of patient condition declining or worsening    Shift Goals  Clinical Goals: Rest  Patient Goals: comfort  Family Goals: TARA    Progress made toward(s) clinical / shift goals:      Problem: Physical Regulation  Goal: Signs and symptoms of infection will decrease  Outcome: Progressing     Problem: Knowledge Deficit - Standard  Goal: Patient and family/care givers will demonstrate understanding of plan of care, disease process/condition, diagnostic tests and medications  Outcome: Progressing       Patient is not progressing towards the following goals:

## 2023-12-30 NOTE — DISCHARGE PLANNING
Received Choice form at 0943  Agency/Facility Name: [facility]  Referral sent per Choice form @ 1597

## 2023-12-30 NOTE — PROGRESS NOTES
Patient given discharge instructions. Discussed diet, activity, follow up appointments, after care, symptoms and management, and prescriptions provided. Packet sent with patient. Midline IV in place for IV infusions w/ home health, discharge paperwork signed, and all questions answered. Patient discharged home via senior transport. Patient discharged from discharge lounge via wheelchair.

## 2023-12-30 NOTE — DISCHARGE PLANNING
Case Management Discharge Planning    Admission Date: 12/27/2023  GMLOS: 4.8  ALOS: 3    6-Clicks ADL Score:    6-Clicks Mobility Score:        Anticipated Discharge Dispo: Discharge Disposition: Discharged to home/self care (01)    DME Needed: No    Action(s) Taken:     Spoke w/ [facility] , they don't service pt's area. Referral sent to [facility]    Spoke with T[facility] and confirmed they can accept pt     Escalations Completed: None    Medically Clear: Yes    Next Steps: D/C home    Barriers to Discharge: None

## 2023-12-30 NOTE — DISCHARGE PLANNING
Case Management Discharge Planning    Admission Date: 12/27/2023  GMLOS: 4.8  ALOS: 3    6-Clicks ADL Score:    6-Clicks Mobility Score:        Anticipated Discharge Dispo: Discharge Disposition: Discharged to home/self care (01)    DME Needed: No    Action(s) Taken:     Spoke with Lindsey frausto/ [facility], they can't accept pt due to staffing    Met with pt at bedside to discuss alternate HH choice. Choice form completed and faxed to [facility].     Pt's address is [address] [place] [id number]    IMM delivered     Escalations Completed: None    Medically Clear: Yes    Next Steps: F/U with HH    Barriers to Discharge: Outpatient referrals pending

## 2023-12-30 NOTE — DISCHARGE SUMMARY
Discharge Summary    CHIEF COMPLAINT ON ADMISSION  Chief Complaint   Patient presents with    Eye Pain     Bilateral eye pain, pt is followed by an ophthalmologist. Redness and swelling noted to both eyes with pt reporting blurred vision       Reason for Admission  Eye Pain     Admission Date  12/27/2023    CODE STATUS  Full    HPI & HOSPITAL COURSE  79 yo man with CAD, COPD, rheumatoid arthritis, HTN, HLD, MDS/[facility]plastic [facility] who presented with bilateral eye swelling and pain. Patient was recently hospitalized for left orbital cellulitis. 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 CT orbits showed bilateral periorbital soft tissue swelling preseptal, no abscess, enlargement of extraocular muscles suggestive of disc thyroid ophthalmology versus inflammatory orbital pseudotumor, dehiscent nasal septum, evidence of bilateral cataract surgery. He was started on IV Unasyn.   Patient had reported to the nurse that patient had suicidal thoughts, thought about shooting himself with his guns and he was placed on a legal hold. Psychiatry was consulted.  Patient had no active suicidal thoughts and his legal hold was discontinued.    Speaking with the patient and his wife, patient had ran out of Synthroid and was no longer taking and unclear whether he was still taking prednisone 5 mg orally daily.  Patient has also attempted to go to the infusion center for Tepezza infusions but they were unable to place any PIV so he never started the medication.  He was continued on Unasyn and given 1 g IV Solu-Medrol and had significant improvement of his erythema and swelling, his double vision also had some improvement.  Case management assisted with setting him up with an infusion for Tepezza on 1/9/2023.  He also had a difficult PIV placement inpatient so I placed a midline  that he will discharge with and use for his outpatient infusion.  Home health services were set up for midline care.  I discussed with him and his wife to follow-up closely next week with Dr. Ariza.  I refilled his Synthroid and will discharge him on a course of Augmentin and steroid taper.    Therefore, he is discharged in good and stable condition to home with organized home healthcare and close outpatient follow-up.    The patient met 2-midnight criteria for an inpatient stay at the time of discharge.    Discharge Date  12/30/2023    FOLLOW UP ITEMS POST DISCHARGE  F/u with Dr. Ariza for steroid management, tepezza infusion    DISCHARGE DIAGNOSES  Principal Problem:    Cellulitis of periorbital region of both eyes (POA: Yes)  Active Problems:    Pancytopenia (HCC) (Chronic) (POA: Yes)    Hypothyroid (POA: Yes)    COPD (chronic obstructive pulmonary disease) (HCC) (POA: Yes)    ACP (advance care planning) (POA: Unknown)    Suicidal ideations (POA: Unknown)  Resolved Problems:    * No resolved hospital problems. *      FOLLOW UP  Future Appointments   Date Time Provider Department Center   1/9/2024  8:30 AM Diallo Ariza M.D. [facility] None     Diallo Ariza M.D.  [address] [place] [address]  [facility] [id number]  [phone]    Schedule an appointment as soon as possible for a visit in 1 week(s)        MEDICATIONS ON DISCHARGE     Medication List        START taking these medications        Instructions   amoxicillin-clavulanate 875-125 MG Tabs  Commonly known as: Augmentin   Take 1 Tablet by mouth 2 times a day for 8 days.  Dose: 1 Tablet     * levothyroxine 150 MCG Tabs  Commonly known as: Synthroid   Take 1 Tablet by mouth every morning on an empty stomach.  Dose: 150 mcg     * levothyroxine 150 MCG Tabs  Commonly known as: Synthroid   Take 1 Tablet by mouth every morning on an empty stomach.  Dose: 150 mcg     * methylPREDNISolone 4 MG Tbpk  Commonly known as: Medrol Dosepak   Follow  schedule on package instructions.     * methylPREDNISolone 4 MG Tbpk  Commonly known as: Medrol Dosepak   Follow schedule on package instructions.           * This list has 4 medication(s) that are the same as other medications prescribed for you. Read the directions carefully, and ask your doctor or other care provider to review them with you.                CONTINUE taking these medications        Instructions   atenolol 25 MG Tabs  Commonly known as: Tenormin   Take 25 mg by mouth every morning.  Dose: 25 mg     atorvastatin 80 MG tablet  Commonly known as: Lipitor   Take 80 mg by mouth every evening.  Dose: 80 mg     cetirizine 10 MG Tabs  Commonly known as: ZyrTEC   Take 10 mg by mouth every morning.  Dose: 10 mg     docusate calcium 240 MG Caps  Commonly known as: Surfak   Take 240 mg by mouth at bedtime.  Dose: 240 mg     isosorbide mononitrate SR 60 MG Tb24  Commonly known as: Imdur   Take 60 mg by mouth every morning.  Dose: 60 mg     oxybutynin 5 MG Tabs  Commonly known as: Ditropan   Take 5 mg by mouth at bedtime.  Dose: 5 mg     potassium chloride SA 20 MEQ Tbcr  Commonly known as: Kdur   Take 20 mEq by mouth 2 times a day.  Dose: 20 mEq     tamsulosin 0.4 MG capsule  Commonly known as: Flomax   Take 0.4 mg by mouth every morning.  Dose: 0.4 mg              Allergies  No Known Allergies    DIET  No orders of the defined types were placed in this encounter.      ACTIVITY  As tolerated.    CONSULTATIONS  none    PROCEDURES  IR-MIDLINE CATHETER INSERTION WO GUIDANCE > AGE 3   Final Result                  Ultrasound-guided midline placement performed by qualified nursing staff    as above.          [id number] WITH   Final Result      1.  Bilateral cataract surgery.   2.  Bilateral periorbital soft tissue swelling, preseptal. Findings most consistent with periorbital cellulitis. No kassandra abscess pocket is evident at this time.   3.  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 Inflammatory orbital pseudotumor would not be excluded.   4.  Dehiscent nasal septum. This can be associated with chronic use of vasoactive nasal spray or other sympathomimetic agents, vasculitis (as in Wegener's), or may represent postoperative change or posttraumatic septal necrosis (consequence of septal    hematoma).            LABORATORY  Lab Results   Component Value Date    SODIUM 135 12/30/2023    POTASSIUM 4.8 12/30/2023    CHLORIDE 104 12/30/2023    CO2 24 12/30/2023    GLUCOSE 109 (H) 12/30/2023    BUN 18 12/30/2023    CREATININE 0.63 12/30/2023        Lab Results   Component Value Date    WBC 7.0 12/30/2023    HEMOGLOBIN 7.9 (L) 12/30/2023    HEMATOCRIT 24.7 (L) 12/30/2023    PLATELETCT 123 (L) 12/30/2023        Total time of the discharge process exceeds 32 minutes.

## 2024-01-09 ENCOUNTER — OFFICE VISIT (OUTPATIENT)
Dept: OPHTHALMOLOGY | Facility: MEDICAL CENTER | Age: 79
End: 2024-01-09
Payer: COMMERCIAL

## 2024-01-09 DIAGNOSIS — H49.9 OPHTHALMOPLEGIA: ICD-10-CM

## 2024-01-09 DIAGNOSIS — H05.89 THYROID ORBITOPATHY: ICD-10-CM

## 2024-01-09 DIAGNOSIS — E07.9 THYROID ORBITOPATHY: ICD-10-CM

## 2024-01-09 PROCEDURE — 99214 OFFICE O/P EST MOD 30 MIN: CPT | Performed by: OPHTHALMOLOGY

## 2024-01-09 PROCEDURE — 92060 SENSORIMOTOR EXAMINATION: CPT | Performed by: OPHTHALMOLOGY

## 2024-01-09 RX ORDER — PREDNISONE 10 MG/1
40 TABLET ORAL DAILY
Qty: 120 TABLET | Refills: 0 | Status: SHIPPED | OUTPATIENT
Start: 2024-01-09

## 2024-01-09 ASSESSMENT — VISUAL ACUITY
CORRECTION_TYPE: GLASSES
METHOD: SNELLEN - LINEAR
OS_CC: 20/40
OS_CC+: -1
OD_CC+: -2
OD_CC: 20/40

## 2024-01-09 ASSESSMENT — REFRACTION_MANIFEST
OD_AXIS: 157
METHOD_AUTOREFRACTION: 1
OD_CYLINDER: +0.25
OS_CYLINDER: +0.50
OS_AXIS: 119
OD_SPHERE: +0.50
OS_SPHERE: +0.50

## 2024-01-09 ASSESSMENT — CUP TO DISC RATIO
OD_RATIO: 0.2
OS_RATIO: 0.2

## 2024-01-09 ASSESSMENT — TONOMETRY
OS_IOP_MMHG: 8
IOP_METHOD: I-CARE
OD_IOP_MMHG: 7

## 2024-01-09 ASSESSMENT — CONF VISUAL FIELD
OS_INFERIOR_NASAL_RESTRICTION: 0
OS_NORMAL: 1
OD_NORMAL: 1
OD_SUPERIOR_NASAL_RESTRICTION: 0
OD_INFERIOR_NASAL_RESTRICTION: 0
OD_INFERIOR_TEMPORAL_RESTRICTION: 0
OS_SUPERIOR_NASAL_RESTRICTION: 0
OS_INFERIOR_TEMPORAL_RESTRICTION: 0
OS_SUPERIOR_TEMPORAL_RESTRICTION: 0
OD_SUPERIOR_TEMPORAL_RESTRICTION: 0

## 2024-01-09 ASSESSMENT — REFRACTION_WEARINGRX
OS_AXIS: 121
OS_CYLINDER: +0.25
OS_ADD: +2.75
OD_ADD: +2.50
OD_SPHERE: -1.00
OD_AXIS: 0
OS_SPHERE: +0.00
OD_CYLINDER: +0.00
SPECS_TYPE: BIFOCAL

## 2024-01-09 ASSESSMENT — EXTERNAL EXAM - LEFT EYE: OS_EXAM: PROPTOSIS

## 2024-01-09 ASSESSMENT — SLIT LAMP EXAM - LIDS
COMMENTS: EDEMA
COMMENTS: EDEMA

## 2024-01-09 ASSESSMENT — EXTERNAL EXAM - RIGHT EYE: OD_EXAM: PROPTOSIS

## 2024-01-09 NOTE — ASSESSMENT & PLAN NOTE
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  Francois score of 7.0 plus 2.  This includes spontaneous orbital pain, gaze evoked orbital pain, eyelid swelling, eyelid erythema, conjunctival redness, chemosis, inflammation of caruncle or plica.  As well as significant increased proptosis and decrease in immune movements.  Given the level of proptosis would recommend starting 1 g Solu-Medrol intravenous daily x3 then oral steroid taper.  Recommend obtaining thyroid antibodies as well as TSH receptor antibody.  May need to get oculoplastic surgery involved.  Current emergent standard treatment now includes teprotumumab.  This is an intravenous infusion that would need to be approved and given through a infusion center.  We will contact the patient access liaison at [facility]  11/15/2023 - Overall significant improvement however sill motility disturbance and proptosis. On steroid taper. Prednisone 60 mg, DAILY Starting Mon 11/13/2023, For 3 days, THEN 50 mg, DAILY Starting Thu 11/16/2023, For 3 days, THEN 40 mg, DAILY Starting Sun 11/19/2023, For 3 days, THEN 30 mg, DAILY Starting Wed 11/22/2023, For 3 days, THEN 20 mg, DAILY Starting Sat 11/25/2023, For 3 days, THEN 10 mg, DAILY Starting Tue 11/28/2023, For 3 days hold at 10 mg per day. [facility] and as well to initiate teprotumumab. Follow up in two weeks.  12/5/2023 - Much less chemosis, but still elevation, depression deficit and abduction deficit of the left eye. Also lid retraction. Will decrease prednisone to 5.0 mg. Awaiting approval for Tepezza  1/9/2024-recent readmission for progressive proptosis and ocular discomfort.  Was given steroids.  Then discharged on Medrol Dosepak.  States that he has improved somewhat but still significant lid erythema and motility  disturbance.  States got an infusion Friday and is post to get again tomorrow, but uncertain if this was the Tepezza.  Therefore we will go back on oral prednisone at 40 mg a day because it appears that is steroid sensitive.  I did review the CT scan on admission dated 12/27/2023.  This demonstrated significant enlargement of the extraocular muscles with normal-appearing tendon still consistent with thyroid orbitopathy.

## 2024-01-09 NOTE — PROGRESS NOTES
Peds/Neuro Ophthalmology:   Diallo Ariza M.D.    Date & Time note created:    1/9/2024   1:06 PM     Referring MD / APRN:  Pcp Not In Computer, No att. providers found    Patient ID:  Name:             King Alegria   YOB: 1945  Age:                 78 y.o.  male   MRN:               [id number]    Chief Complaint/Reason for Visit:     Other (1 month f.v. for orbitopathy )      History of Present Illness:    King Alegria is a 78 y.o. male   1 month f.v. for thyroid orbitopathy. Pt states that he got a transfusion last Friday and since then his eyes have been red and swollen and he hasn't been able to see things at a far distance. Pt says his eye are in a lot of pain and would like helping finding something to relieve some discomfort.       Other        Review of Systems:  Review of Systems   Eyes:         Thyroid orbitopathy    All other systems reviewed and are negative.      Past Medical History:   Past Medical History:   Diagnosis Date    Arthritis     Chronic respiratory failure with hypoxia ([facility]) 09/15/2023    Uses 2lpm at rest, 3lpm with ambulation and at night    COPD (chronic obstructive pulmonary disease) ([facility])     Former smoker 09/15/2023    Heart attack ([facility]) 1998    Heart attack ([facility]) 1999    Had stents placed last month    Hyperlipidemia     Hypertension     Lung nodule 09/15/2023    Pancytopenia ([facility]) 09/15/2023    Prostate cancer ([facility]) 2 years ago    Had seeds    Rheumatoid arthritis ([facility]) 12/15/2015       Past Surgical History:  No past surgical history on file.    Current Outpatient Medications:  Current Outpatient Medications   Medication Sig Dispense Refill    predniSONE (DELTASONE) 10 MG Tab Take 4 Tablets by mouth every day. 120 Tablet 0    levothyroxine (SYNTHROID) 150 MCG Tab Take 1 Tablet by mouth every morning on an empty stomach. 30 Tablet 0    methylPREDNISolone (MEDROL DOSEPAK) 4 MG Tablet Therapy Pack Follow schedule on package instructions. 21 Tablet 0     methylPREDNISolone (MEDROL DOSEPAK) 4 MG Tablet Therapy Pack Follow schedule on package instructions. 21 Tablet 0    levothyroxine (SYNTHROID) 150 MCG Tab Take 1 Tablet by mouth every morning on an empty stomach. 30 Tablet 0    potassium chloride SA (KDUR) 20 MEQ Tab CR Take 20 mEq by mouth 2 times a day.      cetirizine (ZYRTEC) 10 MG Tab Take 10 mg by mouth every morning.      isosorbide mononitrate SR (IMDUR) 60 MG TABLET SR 24 HR Take 60 mg by mouth every morning.      docusate calcium (SURFAK) 240 MG Cap Take 240 mg by mouth at bedtime.      oxybutynin (DITROPAN) 5 MG Tab Take 5 mg by mouth at bedtime.      atenolol (TENORMIN) 25 MG Tab Take 25 mg by mouth every morning.      atorvastatin (LIPITOR) 80 MG tablet Take 80 mg by mouth every evening.      tamsulosin (FLOMAX) 0.4 MG capsule Take 0.4 mg by mouth every morning.       No current facility-administered medications for this visit.       Allergies:  No Known Allergies    Family History:  Family History   Problem Relation Age of Onset    Cancer Mother        Social History:  Social History     Socioeconomic History    Marital status:      Spouse name: Not on file    Number of children: Not on file    Years of education: Not on file    Highest education level: Not on file   Occupational History    Not on file   Tobacco Use    Smoking status: Former     Current packs/day: 0.00     Average packs/day: 2.0 packs/day for 50.0 years (100.0 ttl pk-yrs)     Types: Cigarettes     Start date: 11/30/1963     Quit date: 11/30/2013     Years since quitting: 10.1     Passive exposure: Never    Smokeless tobacco: Never   Vaping Use    Vaping Use: Never used   Substance and Sexual Activity    Alcohol use: No     Comment: ETOH abue in the past    Drug use: No    Sexual activity: Not on file   Other Topics Concern    Not on file   Social History Narrative    Retired     Social Determinants of Health     Financial Resource Strain: Not on file   Food Insecurity: Not on  file   Transportation Needs: Not on file   Physical Activity: Not on file   Stress: Not on file   Social Connections: Not on file   Intimate Partner Violence: Not on file   Housing Stability: Not on file          Physical Exam:  Physical Exam    Oriented x 3  Weight/BMI: There is no height or weight on file to calculate BMI.  There were no vitals taken for this visit.    Base Eye Exam       Visual Acuity (Snellen - Linear)         Right Left    Dist cc 20/40 -2 20/40 -1      Correction: Glasses              Tonometry ([facility], 8:36 AM)         Right Left    Pressure 7 8              Visual Fields         Right Left     Full Full              Neuro/Psych       Oriented x3: Yes    Mood/Affect: Normal                  Strabismus Exam         -2 -2 -2   -3 -3 -3                       0  0   0  -4                       0 0 0   0 0 0                       Slit Lamp and Fundus Exam       External Exam         Right Left    External Proptosis Proptosis              Slit Lamp Exam         Right Left    Lids/Lashes Edema Edema    Conjunctiva/Sclera Injection Injection    Cornea Clear Clear    Anterior Chamber Deep and quiet Deep and quiet    Iris Round and reactive Round and reactive    Lens Clear Clear    Vitreous Normal Normal              Fundus Exam         Right Left    Disc Normal Normal    C/D Ratio 0.2 0.2    Macula Drusen Drusen    Vessels Normal Normal    Periphery Normal Normal                  Refraction       Wearing Rx         Sphere Cylinder Axis Add    Right -1.00 +0.00 0 +2.50    Left +0.00 +0.25 121 +2.75      Type: Bifocal              Manifest Refraction (Auto)         Sphere Cylinder Axis    Right +0.50 +0.25 157    Left +0.50 +0.50 119                    Pertinent Lab/Test/Imaging Review:      Assessment and Plan:     Ophthalmoplegia  11/15/2023 - continued proptosis and bilateral elevation deficit, abduction and adduction deficit of the left eye secondary to VICKIE  12/5/2023 - Improved on the prednisone.  Decrease to 5.0 mg. Awaiting approval for the tepezza    Thyroid orbitopathy  11/9/2023-given the current findings of bilateral severe proptosis with chemosis lid swelling conjunctival redness and some ocular discomfort, most likely diagnosis is acute thyroid orbitopathy.  Francois score of 7.0 plus 2.  This includes spontaneous orbital pain, gaze evoked orbital pain, eyelid swelling, eyelid erythema, conjunctival redness, chemosis, inflammation of caruncle or plica.  As well as significant increased proptosis and decrease in immune movements.  Given the level of proptosis would recommend starting 1 g Solu-Medrol intravenous daily x3 then oral steroid taper.  Recommend obtaining thyroid antibodies as well as TSH receptor antibody.  May need to get oculoplastic surgery involved.  Current emergent standard treatment now includes teprotumumab.  This is an intravenous infusion that would need to be approved and given through a infusion center.  We will contact the patient access liaison at [facility]  11/15/2023 - Overall significant improvement however sill motility disturbance and proptosis. On steroid taper. Prednisone 60 mg, DAILY Starting Mon 11/13/2023, For 3 days, THEN 50 mg, DAILY Starting Thu 11/16/2023, For 3 days, THEN 40 mg, DAILY Starting Sun 11/19/2023, For 3 days, THEN 30 mg, DAILY Starting Wed 11/22/2023, For 3 days, THEN 20 mg, DAILY Starting Sat 11/25/2023, For 3 days, THEN 10 mg, DAILY Starting Tue 11/28/2023, For 3 days hold at 10 mg per day. [facility] function and as well to initiate teprotumumab. Follow up in two weeks.  12/5/2023 - Much less chemosis, but still elevation, depression deficit and abduction deficit of the left eye. Also lid retraction. Will decrease prednisone to 5.0 mg. Awaiting approval for Tepezza  1/9/2024-recent readmission for progressive proptosis and ocular discomfort.  Was given steroids.  Then discharged on Medrol Dosepak.  States that he  has improved somewhat but still significant lid erythema and motility disturbance.  States got an infusion Friday and is post to get again tomorrow, but uncertain if this was the Tepezza.  Therefore we will go back on oral prednisone at 40 mg a day because it appears that is steroid sensitive.  I did review the CT scan on admission dated 12/27/2023.  This demonstrated significant enlargement of the extraocular muscles with normal-appearing tendon still consistent with thyroid orbitopathy.        Diallo Ariza M.D.

## 2024-01-25 ENCOUNTER — OFFICE VISIT (OUTPATIENT)
Dept: OPHTHALMOLOGY | Facility: MEDICAL CENTER | Age: 79
End: 2024-01-25
Payer: COMMERCIAL

## 2024-01-25 DIAGNOSIS — H40.003 GLAUCOMA SUSPECT OF BOTH EYES: ICD-10-CM

## 2024-01-25 DIAGNOSIS — H05.89 THYROID ORBITOPATHY: ICD-10-CM

## 2024-01-25 DIAGNOSIS — H49.9 OPHTHALMOPLEGIA: ICD-10-CM

## 2024-01-25 DIAGNOSIS — H35.352 CYSTOID MACULAR EDEMA OF LEFT EYE: ICD-10-CM

## 2024-01-25 DIAGNOSIS — H35.30 ARMD (AGE RELATED MACULAR DEGENERATION): ICD-10-CM

## 2024-01-25 DIAGNOSIS — E07.9 THYROID ORBITOPATHY: ICD-10-CM

## 2024-01-25 PROCEDURE — 92060 SENSORIMOTOR EXAMINATION: CPT | Performed by: OPHTHALMOLOGY

## 2024-01-25 PROCEDURE — 99213 OFFICE O/P EST LOW 20 MIN: CPT | Mod: 25 | Performed by: OPHTHALMOLOGY

## 2024-01-25 PROCEDURE — 92250 FUNDUS PHOTOGRAPHY W/I&R: CPT | Performed by: OPHTHALMOLOGY

## 2024-01-25 ASSESSMENT — EXTERNAL EXAM - LEFT EYE: OS_EXAM: PROPTOSIS

## 2024-01-25 ASSESSMENT — SLIT LAMP EXAM - LIDS
COMMENTS: LESS EDEMA
COMMENTS: LESS EDEMA

## 2024-01-25 ASSESSMENT — CONF VISUAL FIELD
OS_NORMAL: 1
OS_INFERIOR_TEMPORAL_RESTRICTION: 0
OD_INFERIOR_TEMPORAL_RESTRICTION: 0
OD_SUPERIOR_NASAL_RESTRICTION: 0
OS_SUPERIOR_TEMPORAL_RESTRICTION: 0
OD_INFERIOR_NASAL_RESTRICTION: 0
OS_INFERIOR_NASAL_RESTRICTION: 0
OD_NORMAL: 1
OD_SUPERIOR_TEMPORAL_RESTRICTION: 0
OS_SUPERIOR_NASAL_RESTRICTION: 0

## 2024-01-25 ASSESSMENT — VISUAL ACUITY
OD_SC: 20/25
OD_SC+: -1
OS_SC: 20/50
METHOD: SNELLEN - LINEAR
OS_SC+: -2

## 2024-01-25 ASSESSMENT — REFRACTION_MANIFEST
OD_AXIS: 097
OD_SPHERE: -0.25
OS_CYLINDER: +1.00
OS_AXIS: 083
OD_CYLINDER: +0.50
OS_SPHERE: -0.75
METHOD_AUTOREFRACTION: 1

## 2024-01-25 ASSESSMENT — CUP TO DISC RATIO
OD_RATIO: 0.2
OS_RATIO: 0.2

## 2024-01-25 ASSESSMENT — ENCOUNTER SYMPTOMS: EYE PAIN: 1

## 2024-01-25 ASSESSMENT — TONOMETRY
IOP_METHOD: I-CARE
OS_IOP_MMHG: 9
OD_IOP_MMHG: 9

## 2024-01-25 ASSESSMENT — EXTERNAL EXAM - RIGHT EYE: OD_EXAM: PROPTOSIS

## 2024-01-25 NOTE — ASSESSMENT & PLAN NOTE
11/15/2023 - continued proptosis and bilateral elevation deficit, abduction and adduction deficit of the left eye secondary to VICKIE  12/5/2023 - Improved on the prednisone. Decrease to 5.0 mg. Awaiting approval for the tepezza  1/25/2024-overall stable

## 2024-01-25 NOTE — ASSESSMENT & PLAN NOTE
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  Francois score of 7.0 plus 2.  This includes spontaneous orbital pain, gaze evoked orbital pain, eyelid swelling, eyelid erythema, conjunctival redness, chemosis, inflammation of caruncle or plica.  As well as significant increased proptosis and decrease in immune movements.  Given the level of proptosis would recommend starting 1 g Solu-Medrol intravenous daily x3 then oral steroid taper.  Recommend obtaining thyroid antibodies as well as TSH receptor antibody.  May need to get oculoplastic surgery involved.  Current emergent standard treatment now includes teprotumumab.  This is an intravenous infusion that would need to be approved and given through a infusion center.  We will contact the patient access liaison at [facility]  11/15/2023 - Overall significant improvement however sill motility disturbance and proptosis. On steroid taper. Prednisone 60 mg, DAILY Starting Mon 11/13/2023, For 3 days, THEN 50 mg, DAILY Starting Thu 11/16/2023, For 3 days, THEN 40 mg, DAILY Starting Sun 11/19/2023, For 3 days, THEN 30 mg, DAILY Starting Wed 11/22/2023, For 3 days, THEN 20 mg, DAILY Starting Sat 11/25/2023, For 3 days, THEN 10 mg, DAILY Starting Tue 11/28/2023, For 3 days hold at 10 mg per day. [facility] function and as well to initiate teprotumumab. Follow up in two weeks.  12/5/2023 - Much less chemosis, but still elevation, depression deficit and abduction deficit of the left eye. Also lid retraction. Will decrease prednisone to 5.0 mg. Awaiting approval for Tepezza  1/9/2024-recent readmission for progressive proptosis and ocular discomfort.  Was given steroids.  Then discharged on Medrol Dosepak.  States that he has improved somewhat but still significant lid erythema and motility  disturbance.  States got an infusion Friday and is post to get again tomorrow, but uncertain if this was the Tepezza.  Therefore we will go back on oral prednisone at 40 mg a day because it appears that is steroid sensitive.  I did review the CT scan on admission dated 12/27/2023.  This demonstrated significant enlargement of the extraocular muscles with normal-appearing tendon still consistent with thyroid orbitopathy.  1/25/2024 -tomorrow is second dose of Tepezza.  Overall with the combination of oral steroids significant improvement.  Less chemosis, proptosis and injection.  Still has some restrictive orbitopathy.  IOP stable.  Will continue to monitor

## 2024-01-25 NOTE — ASSESSMENT & PLAN NOTE
11/15/2023 - CME left eye seen on OCT  1/25/2024-some slight improvement.  Possibly secondary to the steroids and institution of Tepezza

## 2024-01-25 NOTE — PROGRESS NOTES
Peds/Neuro Ophthalmology:   Diallo Ariza M.D.    Date & Time note created:    1/25/2024   11:03 AM     Referring MD / APRN:  Pcp Not In Computer, No att. providers found    Patient ID:  Name:             King Alegria   YOB: 1945  Age:                 78 y.o.  male   MRN:               [id number]    Chief Complaint/Reason for Visit:     Other (2 week f.v. for ophthalmoplegia )      History of Present Illness:    King Alegria is a 78 y.o. male   2 week f.v. for ophthalmoplegia OU. Pt states that his eyes are still red, swollen and burning and the medication did not help. Pt states his vision is stable. Pt states that he is getting his infusion again tomorrow.      Other        Review of Systems:  Review of Systems   Eyes:  Positive for pain.        Ophthalmoplegia OU       Past Medical History:   Past Medical History:   Diagnosis Date    Arthritis     Chronic respiratory failure with hypoxia ([facility]) 09/15/2023    Uses 2lpm at rest, 3lpm with ambulation and at night    COPD (chronic obstructive pulmonary disease) ([facility])     Former smoker 09/15/2023    Heart attack ([facility]) 1998    Heart attack ([facility]) 1999    Had stents placed last month    Hyperlipidemia     Hypertension     Lung nodule 09/15/2023    Pancytopenia ([facility]) 09/15/2023    Prostate cancer ([facility]) 2 years ago    Had seeds    Rheumatoid arthritis ([facility]) 12/15/2015       Past Surgical History:  History reviewed. No pertinent surgical history.    Current Outpatient Medications:  Current Outpatient Medications   Medication Sig Dispense Refill    Lifitegrast 5 % Solution Administer 1 Drop into affected eye(s) 2 times a day. Both eyes 90 Each 3    predniSONE (DELTASONE) 10 MG Tab Take 4 Tablets by mouth every day. 120 Tablet 0    levothyroxine (SYNTHROID) 150 MCG Tab Take 1 Tablet by mouth every morning on an empty stomach. 30 Tablet 0    methylPREDNISolone (MEDROL DOSEPAK) 4 MG Tablet Therapy Pack Follow schedule on package instructions. 21  Tablet 0    methylPREDNISolone (MEDROL DOSEPAK) 4 MG Tablet Therapy Pack Follow schedule on package instructions. 21 Tablet 0    levothyroxine (SYNTHROID) 150 MCG Tab Take 1 Tablet by mouth every morning on an empty stomach. 30 Tablet 0    potassium chloride SA (KDUR) 20 MEQ Tab CR Take 20 mEq by mouth 2 times a day.      cetirizine (ZYRTEC) 10 MG Tab Take 10 mg by mouth every morning.      isosorbide mononitrate SR (IMDUR) 60 MG TABLET SR 24 HR Take 60 mg by mouth every morning.      docusate calcium (SURFAK) 240 MG Cap Take 240 mg by mouth at bedtime.      oxybutynin (DITROPAN) 5 MG Tab Take 5 mg by mouth at bedtime.      atenolol (TENORMIN) 25 MG Tab Take 25 mg by mouth every morning.      atorvastatin (LIPITOR) 80 MG tablet Take 80 mg by mouth every evening.      tamsulosin (FLOMAX) 0.4 MG capsule Take 0.4 mg by mouth every morning.       No current facility-administered medications for this visit.       Allergies:  No Known Allergies    Family History:  Family History   Problem Relation Age of Onset    Cancer Mother        Social History:  Social History     Socioeconomic History    Marital status:      Spouse name: Not on file    Number of children: Not on file    Years of education: Not on file    Highest education level: Not on file   Occupational History    Not on file   Tobacco Use    Smoking status: Former     Current packs/day: 0.00     Average packs/day: 2.0 packs/day for 50.0 years (100.0 ttl pk-yrs)     Types: Cigarettes     Start date: 11/30/1963     Quit date: 11/30/2013     Years since quitting: 10.1     Passive exposure: Never    Smokeless tobacco: Never   Vaping Use    Vaping Use: Never used   Substance and Sexual Activity    Alcohol use: No     Comment: ETOH abue in the past    Drug use: No    Sexual activity: Not on file   Other Topics Concern    Not on file   Social History Narrative    Retired     Social Determinants of Health     Financial Resource Strain: Not on file   Food  Insecurity: Not on file   Transportation Needs: Not on file   Physical Activity: Not on file   Stress: Not on file   Social Connections: Not on file   Intimate Partner Violence: Not on file   Housing Stability: Not on file          Physical Exam:  Physical Exam    Oriented x 3  Weight/BMI: There is no height or weight on file to calculate BMI.  There were no vitals taken for this visit.    Base Eye Exam       Visual Acuity (Snellen - Linear)         Right Left    Dist sc 20/25 -1 20/50 -2              Tonometry ([facility], 10:08 AM)         Right Left    Pressure 9 9              Pupils         Pupils    Right PERRL    Left PERRL              Visual Fields         Right Left     Full Full              Neuro/Psych       Oriented x3: Yes    Mood/Affect: Normal                  Strabismus Exam         -2 -2 -2   -3 -3 -3                       0  -2   0  -2                       0 0 0   0 0 0                       Slit Lamp and Fundus Exam       External Exam         Right Left    External Proptosis Proptosis              Slit Lamp Exam         Right Left    Lids/Lashes less Edema less Edema    Conjunctiva/Sclera White and quiet White and quiet    Cornea Clear Clear    Anterior Chamber Deep and quiet Deep and quiet    Iris Round and reactive Round and reactive    Lens Clear Clear    Vitreous Normal Normal              Fundus Exam         Right Left    Disc Normal Normal    C/D Ratio 0.2 0.2    Macula Drusen Drusen    Vessels Normal Normal    Periphery Normal Normal                  Refraction       Manifest Refraction (Auto)         Sphere Cylinder Axis    Right -0.25 +0.50 097    Left -0.75 +1.00 083                    Pertinent Lab/Test/Imaging Review:      Assessment and Plan:     ARMD (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  Possibly secondary to the steroids and institution of  Tepezza    Glaucoma suspect of both eyes  11/15/2023 - IOP stable. OCT NFL thickness 88 OD and 87 OS  1/25/2024-IOP stable.  OCT neurofibrillary thickness 86 OD 82 OS    Thyroid orbitopathy  11/9/2023-given the current findings of bilateral severe proptosis with chemosis lid swelling conjunctival redness and some ocular discomfort, most likely diagnosis is acute thyroid orbitopathy.  Francois score of 7.0 plus 2.  This includes spontaneous orbital pain, gaze evoked orbital pain, eyelid swelling, eyelid erythema, conjunctival redness, chemosis, inflammation of caruncle or plica.  As well as significant increased proptosis and decrease in immune movements.  Given the level of proptosis would recommend starting 1 g Solu-Medrol intravenous daily x3 then oral steroid taper.  Recommend obtaining thyroid antibodies as well as TSH receptor antibody.  May need to get oculoplastic surgery involved.  Current emergent standard treatment now includes teprotumumab.  This is an intravenous infusion that would need to be approved and given through a infusion center.  We will contact the patient access liaison at [facility]  11/15/2023 - Overall significant improvement however sill motility disturbance and proptosis. On steroid taper. Prednisone 60 mg, DAILY Starting Mon 11/13/2023, For 3 days, THEN 50 mg, DAILY Starting Thu 11/16/2023, For 3 days, THEN 40 mg, DAILY Starting Sun 11/19/2023, For 3 days, THEN 30 mg, DAILY Starting Wed 11/22/2023, For 3 days, THEN 20 mg, DAILY Starting Sat 11/25/2023, For 3 days, THEN 10 mg, DAILY Starting Tue 11/28/2023, For 3 days hold at 10 mg per day. [facility] function and as well to initiate teprotumumab. Follow up in two weeks.  12/5/2023 - Much less chemosis, but still elevation, depression deficit and abduction deficit of the left eye. Also lid retraction. Will decrease prednisone to 5.0 mg. Awaiting approval for Tepezza  1/9/2024-recent readmission for  progressive proptosis and ocular discomfort.  Was given steroids.  Then discharged on Medrol Dosepak.  States that he has improved somewhat but still significant lid erythema and motility disturbance.  States got an infusion Friday and is post to get again tomorrow, but uncertain if this was the Tepezza.  Therefore we will go back on oral prednisone at 40 mg a day because it appears that is steroid sensitive.  I did review the CT scan on admission dated 12/27/2023.  This demonstrated significant enlargement of the extraocular muscles with normal-appearing tendon still consistent with thyroid orbitopathy.  1/25/2024 -tomorrow is second dose of Tepezza.  Overall with the combination of oral steroids significant improvement.  Less chemosis, proptosis and injection.  Still has some restrictive orbitopathy.  IOP stable.  Will continue to monitor    Ophthalmoplegia  11/15/2023 - continued proptosis and bilateral elevation deficit, abduction and adduction deficit of the left eye secondary to VICKIE  12/5/2023 - Improved on the prednisone. Decrease to 5.0 mg. Awaiting approval for the tepezza  1/25/2024-overall stable        Diallo Ariza M.D.

## 2024-01-25 NOTE — ASSESSMENT & PLAN NOTE
11/15/2023 - IOP stable. OCT NFL thickness 88 OD and 87 OS  1/25/2024-IOP stable.  OCT neurofibrillary thickness 86 OD 82 OS

## 2024-02-20 RX ORDER — CYCLOSPORINE 0.5 MG/ML
1 EMULSION OPHTHALMIC 2 TIMES DAILY
Qty: 5.5 ML | Refills: 6 | Status: SHIPPED | OUTPATIENT
Start: 2024-02-20

## 2024-02-26 RX ORDER — CYCLOSPORINE 0.5 MG/ML
1 EMULSION OPHTHALMIC 2 TIMES DAILY
Qty: 30 EACH | Refills: 6 | Status: SHIPPED | OUTPATIENT
Start: 2024-02-26

## 2024-02-28 ENCOUNTER — APPOINTMENT (OUTPATIENT)
Dept: OPHTHALMOLOGY | Facility: MEDICAL CENTER | Age: 79
End: 2024-02-28
Payer: COMMERCIAL

## 2024-05-07 ENCOUNTER — APPOINTMENT (OUTPATIENT)
Dept: OPHTHALMOLOGY | Facility: MEDICAL CENTER | Age: 79
End: 2024-05-07
Payer: COMMERCIAL

## 2024-05-07 DIAGNOSIS — H35.352 CYSTOID MACULAR EDEMA OF LEFT EYE: ICD-10-CM

## 2024-05-07 DIAGNOSIS — H49.9 OPHTHALMOPLEGIA: ICD-10-CM

## 2024-05-07 DIAGNOSIS — H05.89 THYROID ORBITOPATHY: ICD-10-CM

## 2024-05-07 DIAGNOSIS — E07.9 THYROID ORBITOPATHY: ICD-10-CM

## 2024-05-07 DIAGNOSIS — H35.30 ARMD (AGE RELATED MACULAR DEGENERATION): ICD-10-CM

## 2024-05-07 DIAGNOSIS — H40.003 GLAUCOMA SUSPECT OF BOTH EYES: ICD-10-CM

## 2024-05-07 PROCEDURE — 99214 OFFICE O/P EST MOD 30 MIN: CPT | Mod: 25 | Performed by: OPHTHALMOLOGY

## 2024-05-07 PROCEDURE — 92250 FUNDUS PHOTOGRAPHY W/I&R: CPT | Performed by: OPHTHALMOLOGY

## 2024-05-07 PROCEDURE — 92060 SENSORIMOTOR EXAMINATION: CPT | Performed by: OPHTHALMOLOGY

## 2024-05-07 ASSESSMENT — REFRACTION_MANIFEST
OD_CYLINDER: +0.00
OD_AXIS: 0
METHOD_AUTOREFRACTION: 1
OS_CYLINDER: +1.00
OS_SPHERE: -0.50
OS_AXIS: 082
OD_SPHERE: +0.25

## 2024-05-07 ASSESSMENT — VISUAL ACUITY
OS_SC: 20/30
OD_SC+: -1
METHOD: SNELLEN - LINEAR
OD_SC: 20/20
OS_SC+: -1

## 2024-05-07 ASSESSMENT — EXTERNAL EXAM - LEFT EYE: OS_EXAM: PROPTOSIS

## 2024-05-07 ASSESSMENT — TONOMETRY
IOP_METHOD: I-CARE
OD_IOP_MMHG: 9
OS_IOP_MMHG: 9

## 2024-05-07 ASSESSMENT — SLIT LAMP EXAM - LIDS
COMMENTS: LESS EDEMA
COMMENTS: LESS EDEMA

## 2024-05-07 ASSESSMENT — CUP TO DISC RATIO
OD_RATIO: 0.2
OS_RATIO: 0.2

## 2024-05-07 ASSESSMENT — EXTERNAL EXAM - RIGHT EYE: OD_EXAM: PROPTOSIS

## 2024-05-07 ASSESSMENT — ENCOUNTER SYMPTOMS: BLURRED VISION: 1

## 2024-05-07 NOTE — PROGRESS NOTES
Peds/Neuro Ophthalmology:   Diallo Ariza M.D.    Date & Time note created:    5/7/2024   12:29 PM     Referring MD / APRN:  Pcp Not In Computer, No att. providers found    Patient ID:  Name:             King Alegria   YOB: 1945  Age:                 79 y.o.  male   MRN:               [id number]    Chief Complaint/Reason for Visit:     Other (3 month f.v. ARMD)      History of Present Illness:    King Alegria is a 79 y.o. male   3 month f.v. for ARMD. Pt denies any pain or discomfort OU. Pt states that in the past two days his vision has gotten a little clearer. This is the first improved symptom he has gotten since starting Tepezza.     Other        Review of Systems:  Review of Systems   Eyes:  Positive for blurred vision.        ARMD   All other systems reviewed and are negative.      Past Medical History:   Past Medical History:   Diagnosis Date    Arthritis     Chronic respiratory failure with hypoxia (Formerly [facility]) 09/15/2023    Uses 2lpm at rest, 3lpm with ambulation and at night    COPD (chronic obstructive pulmonary disease) (Formerly [facility])     Former smoker 09/15/2023    Heart attack ([facility]ly [facility]) 1998    Heart attack ([facility]) 1999    Had stents placed last month    Hyperlipidemia     Hypertension     Lung nodule 09/15/2023    Pancytopenia ([facility]) 09/15/2023    Prostate cancer (Formerly [facility]) 2 years ago    Had seeds    Rheumatoid arthritis (Formerly [facility]) 12/15/2015       Past Surgical History:  History reviewed. No pertinent surgical history.    Current Outpatient Medications:  Current Outpatient Medications   Medication Sig Dispense Refill    cycloSPORINE (RESTASIS) 0.05 % ophthalmic emulsion Administer 1 Drop into both eyes 2 times a day. 30 Each 6    cycloSPORINE (RESTASIS) 0.05 % ophthalmic emulsion Administer 1 Drop into both eyes 2 times a day. 5.5 mL 6    Lifitegrast 5 % Solution Administer 1 Drop into affected eye(s) 2 times a day. Both eyes 90 Each 3    predniSONE (DELTASONE) 10 MG Tab Take 4 Tablets by mouth every  day. 120 Tablet 0    levothyroxine (SYNTHROID) 150 MCG Tab Take 1 Tablet by mouth every morning on an empty stomach. 30 Tablet 0    methylPREDNISolone (MEDROL DOSEPAK) 4 MG Tablet Therapy Pack Follow schedule on package instructions. 21 Tablet 0    methylPREDNISolone (MEDROL DOSEPAK) 4 MG Tablet Therapy Pack Follow schedule on package instructions. 21 Tablet 0    levothyroxine (SYNTHROID) 150 MCG Tab Take 1 Tablet by mouth every morning on an empty stomach. 30 Tablet 0    potassium chloride SA (KDUR) 20 MEQ Tab CR Take 20 mEq by mouth 2 times a day.      cetirizine (ZYRTEC) 10 MG Tab Take 10 mg by mouth every morning.      isosorbide mononitrate SR (IMDUR) 60 MG TABLET SR 24 HR Take 60 mg by mouth every morning.      docusate calcium (SURFAK) 240 MG Cap Take 240 mg by mouth at bedtime.      oxybutynin (DITROPAN) 5 MG Tab Take 5 mg by mouth at bedtime.      atenolol (TENORMIN) 25 MG Tab Take 25 mg by mouth every morning.      atorvastatin (LIPITOR) 80 MG tablet Take 80 mg by mouth every evening.      tamsulosin (FLOMAX) 0.4 MG capsule Take 0.4 mg by mouth every morning.       No current facility-administered medications for this visit.       Allergies:  No Known Allergies    Family History:  Family History   Problem Relation Age of Onset    Cancer Mother        Social History:  Social History     Socioeconomic History    Marital status:      Spouse name: Not on file    Number of children: Not on file    Years of education: Not on file    Highest education level: Not on file   Occupational History    Not on file   Tobacco Use    Smoking status: Former     Current packs/day: 0.00     Average packs/day: 2.0 packs/day for 50.0 years (100.0 ttl pk-yrs)     Types: Cigarettes     Start date: 11/30/1963     Quit date: 11/30/2013     Years since quitting: 10.4     Passive exposure: Never    Smokeless tobacco: Never   Vaping Use    Vaping Use: Never used   Substance and Sexual Activity    Alcohol use: No     Comment:  ETOH abue in the past    Drug use: No    Sexual activity: Not on file   Other Topics Concern    Not on file   Social History Narrative    Retired     Social Determinants of Health     Financial Resource Strain: Not on file   Food Insecurity: Not on file   Transportation Needs: Not on file   Physical Activity: Not on file   Stress: Not on file   Social Connections: Not on file   Intimate Partner Violence: Not on file   Housing Stability: Not on file          Physical Exam:  Physical Exam    Oriented x 3  Weight/BMI: There is no height or weight on file to calculate BMI.  There were no vitals taken for this visit.    Base Eye Exam       Visual Acuity (Snellen - Linear)         Right Left    Dist sc 20/20 -1 20/30 -1              Tonometry (i-care, 9:43 AM)         Right Left    Pressure 9 9              Neuro/Psych       Oriented x3: Yes    Mood/Affect: Normal                  Additional Tests       Stereo       Fly: -    Animals: 0/3    Circles: 0/9                  Strabismus Exam         -2 -2 -2   -3 -3 -3                       0  -2   0  -2                       0 0 0   0 0 0                       Slit Lamp and Fundus Exam       External Exam         Right Left    External Proptosis Proptosis              Slit Lamp Exam         Right Left    Lids/Lashes less Edema less Edema    Conjunctiva/Sclera White and quiet White and quiet    Cornea Clear Clear    Anterior Chamber Deep and quiet Deep and quiet    Iris Round and reactive Round and reactive    Lens Clear Clear    Vitreous Normal Normal              Fundus Exam         Right Left    Disc Normal Normal    C/D Ratio 0.2 0.2    Macula Drusen Drusen    Vessels Normal Normal    Periphery Normal Normal                  Refraction       Manifest Refraction (Auto)         Sphere Cylinder Axis    Right +0.25 +0.00 0    Left -0.50 +1.00 082                    Pertinent Lab/Test/Imaging Review:      Assessment and Plan:     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  Possibly secondary to the steroids and institution of Tepezza   5/7/2024-overall stable    Glaucoma suspect of both eyes  11/15/2023 - IOP stable. OCT NFL thickness 88 OD and 87 OS  1/25/2024-IOP stable.  OCT neurofibrillary thickness 86 OD 82 OS  5/7/2024-no progressive cupping.  OCT neurofibrillary thickness 80 OD 79 OS    Ophthalmoplegia  11/15/2023 - continued proptosis and bilateral elevation deficit, abduction and adduction deficit of the left eye secondary to VICKIE  12/5/2023 - Improved on the prednisone. Decrease to 5.0 mg. Awaiting approval for the tepezza  1/25/2024-overall stable  5/7/2024-off the prednisone.  Status post 4 infusions of Tepezza.  stable adduction deficit and elevation deficit of the right eye stable abduction elevation deficit of the left eye    Thyroid orbitopathy  11/9/2023-given the current findings of bilateral severe proptosis with chemosis lid swelling conjunctival redness and some ocular discomfort, most likely diagnosis is acute thyroid orbitopathy.  Francois score of 7.0 plus 2.  This includes spontaneous orbital pain, gaze evoked orbital pain, eyelid swelling, eyelid erythema, conjunctival redness, chemosis, inflammation of caruncle or plica.  As well as significant increased proptosis and decrease in immune movements.  Given the level of proptosis would recommend starting 1 g Solu-Medrol intravenous daily x3 then oral steroid taper.  Recommend obtaining thyroid antibodies as well as TSH receptor antibody.  May need to get oculoplastic surgery involved.  Current emergent standard treatment now includes teprotumumab.  This is an intravenous infusion that would need to be approved and given through a infusion center.  We will contact the patient access liaison at [facility] examples  11/15/2023 - Overall significant improvement however sill  motility disturbance and proptosis. On steroid taper. Prednisone 60 mg, DAILY Starting Mon 11/13/2023, For 3 days, THEN 50 mg, DAILY Starting Thu 11/16/2023, For 3 days, THEN 40 mg, DAILY Starting Sun 11/19/2023, For 3 days, THEN 30 mg, DAILY Starting Wed 11/22/2023, For 3 days, THEN 20 mg, DAILY Starting Sat 11/25/2023, For 3 days, THEN 10 mg, DAILY Starting Tue 11/28/2023, For 3 days hold at 10 mg per day. [facility] his thyroid function and as well to initiate teprotumumab. Follow up in two weeks.  12/5/2023 - Much less chemosis, but still elevation, depression deficit and abduction deficit of the left eye. Also lid retraction. Will decrease prednisone to 5.0 mg. Awaiting approval for Tepezza  1/9/2024-recent readmission for progressive proptosis and ocular discomfort.  Was given steroids.  Then discharged on Medrol Dosepak.  States that he has improved somewhat but still significant lid erythema and motility disturbance.  States got an infusion Friday and is post to get again tomorrow, but uncertain if this was the Tepezza.  Therefore we will go back on oral prednisone at 40 mg a day because it appears that is steroid sensitive.  I did review the CT scan on admission dated 12/27/2023.  This demonstrated significant enlargement of the extraocular muscles with normal-appearing tendon still consistent with thyroid orbitopathy.  1/25/2024 -tomorrow is second dose of Tepezza.  Overall with the combination of oral steroids significant improvement.  Less chemosis, proptosis and injection.  Still has some restrictive orbitopathy.  IOP stable.  Will continue to monitor  5/7/2024-overall significant improvement although still does have the extraocular motility abnormality.  In primary position is orthotropic.  Is off prednisone.  Status post for Tepezza infusions.        Diallo Ariza M.D.

## 2024-05-07 NOTE — ASSESSMENT & PLAN NOTE
11/15/2023 - continued proptosis and bilateral elevation deficit, abduction and adduction deficit of the left eye secondary to VICKIE  12/5/2023 - Improved on the prednisone. Decrease to 5.0 mg. Awaiting approval for the tepezza  1/25/2024-overall stable  5/7/2024-off the prednisone.  Status post 4 infusions of Tepezza.  stable adduction deficit and elevation deficit of the right eye stable abduction elevation deficit of the left eye

## 2024-05-07 NOTE — ASSESSMENT & PLAN NOTE
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  Francois score of 7.0 plus 2.  This includes spontaneous orbital pain, gaze evoked orbital pain, eyelid swelling, eyelid erythema, conjunctival redness, chemosis, inflammation of caruncle or plica.  As well as significant increased proptosis and decrease in immune movements.  Given the level of proptosis would recommend starting 1 g Solu-Medrol intravenous daily x3 then oral steroid taper.  Recommend obtaining thyroid antibodies as well as TSH receptor antibody.  May need to get oculoplastic surgery involved.  Current emergent standard treatment now includes teprotumumab.  This is an intravenous infusion that would need to be approved and given through a infusion center.  We will contact the patient access liaison at [facility]  11/15/2023 - Overall significant improvement however sill motility disturbance and proptosis. On steroid taper. Prednisone 60 mg, DAILY Starting Mon 11/13/2023, For 3 days, THEN 50 mg, DAILY Starting Thu 11/16/2023, For 3 days, THEN 40 mg, DAILY Starting Sun 11/19/2023, For 3 days, THEN 30 mg, DAILY Starting Wed 11/22/2023, For 3 days, THEN 20 mg, DAILY Starting Sat 11/25/2023, For 3 days, THEN 10 mg, DAILY Starting Tue 11/28/2023, For 3 days hold at 10 mg per day. [facility] his thyroid function and as well to initiate teprotumumab. Follow up in two weeks.  12/5/2023 - Much less chemosis, but still elevation, depression deficit and abduction deficit of the left eye. Also lid retraction. Will decrease prednisone to 5.0 mg. Awaiting approval for Tepezza  1/9/2024-recent readmission for progressive proptosis and ocular discomfort.  Was given steroids.  Then discharged on Medrol Dosepak.  States that he has improved somewhat but still significant lid erythema and motility  disturbance.  States got an infusion Friday and is post to get again tomorrow, but uncertain if this was the Tepezza.  Therefore we will go back on oral prednisone at 40 mg a day because it appears that is steroid sensitive.  I did review the CT scan on admission dated 12/27/2023.  This demonstrated significant enlargement of the extraocular muscles with normal-appearing tendon still consistent with thyroid orbitopathy.  1/25/2024 -tomorrow is second dose of Tepezza.  Overall with the combination of oral steroids significant improvement.  Less chemosis, proptosis and injection.  Still has some restrictive orbitopathy.  IOP stable.  Will continue to monitor  5/7/2024-overall significant improvement although still does have the extraocular motility abnormality.  In primary position is orthotropic.  Is off prednisone.  Status post for Tepezza infusions.

## 2024-05-07 NOTE — ASSESSMENT & PLAN NOTE
11/15/2023 - IOP stable. OCT NFL thickness 88 OD and 87 OS  1/25/2024-IOP stable.  OCT neurofibrillary thickness 86 OD 82 OS  5/7/2024-no progressive cupping.  OCT neurofibrillary thickness 80 OD 79 OS

## 2024-05-07 NOTE — ASSESSMENT & PLAN NOTE
11/15/2023 - CME left eye seen on OCT  1/25/2024-some slight improvement.  Possibly secondary to the steroids and institution of Tepezza   5/7/2024-overall stable

## 2024-06-18 ENCOUNTER — APPOINTMENT (OUTPATIENT)
Dept: RADIOLOGY | Facility: MEDICAL CENTER | Age: 79
DRG: 811 | End: 2024-06-18
Attending: EMERGENCY MEDICINE
Payer: COMMERCIAL

## 2024-06-18 ENCOUNTER — HOSPITAL ENCOUNTER (INPATIENT)
Facility: MEDICAL CENTER | Age: 79
LOS: 3 days | DRG: 811 | End: 2024-06-21
Attending: EMERGENCY MEDICINE | Admitting: INTERNAL MEDICINE
Payer: COMMERCIAL

## 2024-06-18 DIAGNOSIS — D46.9 MDS (MYELODYSPLASTIC SYNDROME) (HCC): ICD-10-CM

## 2024-06-18 DIAGNOSIS — D61.9 APLASTIC ANEMIA (HCC): ICD-10-CM

## 2024-06-18 DIAGNOSIS — D64.9 ANEMIA, UNSPECIFIED TYPE: ICD-10-CM

## 2024-06-18 LAB
ABO GROUP BLD: NORMAL
ALBUMIN SERPL BCP-MCNC: 3.7 G/DL (ref 3.2–4.9)
ALBUMIN/GLOB SERPL: 1 G/DL
ALP SERPL-CCNC: 76 U/L (ref 30–99)
ALT SERPL-CCNC: 26 U/L (ref 2–50)
ANION GAP SERPL CALC-SCNC: 13 MMOL/L (ref 7–16)
ANISOCYTOSIS BLD QL SMEAR: ABNORMAL
APPEARANCE UR: CLEAR
AST SERPL-CCNC: 17 U/L (ref 12–45)
BACTERIA #/AREA URNS HPF: ABNORMAL /HPF
BARCODED ABORH UBTYP: 600
BARCODED PRD CODE UBPRD: NORMAL
BARCODED UNIT NUM UBUNT: NORMAL
BASOPHILS # BLD AUTO: 0.3 % (ref 0–1.8)
BASOPHILS # BLD: 0.01 K/UL (ref 0–0.12)
BILIRUB SERPL-MCNC: 0.4 MG/DL (ref 0.1–1.5)
BILIRUB UR QL STRIP.AUTO: NEGATIVE
BLD GP AB SCN SERPL QL: NORMAL
BUN SERPL-MCNC: 14 MG/DL (ref 8–22)
CALCIUM ALBUM COR SERPL-MCNC: 9.5 MG/DL (ref 8.5–10.5)
CALCIUM SERPL-MCNC: 9.3 MG/DL (ref 8.5–10.5)
CHLORIDE SERPL-SCNC: 102 MMOL/L (ref 96–112)
CO2 SERPL-SCNC: 21 MMOL/L (ref 20–33)
COLOR UR: YELLOW
COMMENT 1642: NORMAL
COMPONENT R 8504R: NORMAL
CREAT SERPL-MCNC: 0.6 MG/DL (ref 0.5–1.4)
EKG IMPRESSION: NORMAL
EOSINOPHIL # BLD AUTO: 0 K/UL (ref 0–0.51)
EOSINOPHIL NFR BLD: 0 % (ref 0–6.9)
EPI CELLS #/AREA URNS HPF: NEGATIVE /HPF
ERYTHROCYTE [DISTWIDTH] IN BLOOD BY AUTOMATED COUNT: 77.6 FL (ref 35.9–50)
FERRITIN SERPL-MCNC: 1516 NG/ML (ref 22–322)
GFR SERPLBLD CREATININE-BSD FMLA CKD-EPI: 99 ML/MIN/1.73 M 2
GLOBULIN SER CALC-MCNC: 3.6 G/DL (ref 1.9–3.5)
GLUCOSE SERPL-MCNC: 95 MG/DL (ref 65–99)
GLUCOSE UR STRIP.AUTO-MCNC: NEGATIVE MG/DL
HCT VFR BLD AUTO: 19.8 % (ref 42–52)
HGB BLD-MCNC: 6.4 G/DL (ref 14–18)
HYALINE CASTS #/AREA URNS LPF: ABNORMAL /LPF
IMM GRANULOCYTES # BLD AUTO: 0.04 K/UL (ref 0–0.11)
IMM GRANULOCYTES NFR BLD AUTO: 1.2 % (ref 0–0.9)
IRON SATN MFR SERPL: 32 % (ref 15–55)
IRON SERPL-MCNC: 67 UG/DL (ref 50–180)
KETONES UR STRIP.AUTO-MCNC: NEGATIVE MG/DL
LEUKOCYTE ESTERASE UR QL STRIP.AUTO: ABNORMAL
LYMPHOCYTES # BLD AUTO: 0.32 K/UL (ref 1–4.8)
LYMPHOCYTES NFR BLD: 9.6 % (ref 22–41)
MACROCYTES BLD QL SMEAR: ABNORMAL
MAGNESIUM SERPL-MCNC: 2.1 MG/DL (ref 1.5–2.5)
MCH RBC QN AUTO: 40 PG (ref 27–33)
MCHC RBC AUTO-ENTMCNC: 32.3 G/DL (ref 32.3–36.5)
MCV RBC AUTO: 123.8 FL (ref 81.4–97.8)
MICRO URNS: ABNORMAL
MONOCYTES # BLD AUTO: 0.32 K/UL (ref 0–0.85)
MONOCYTES NFR BLD AUTO: 9.6 % (ref 0–13.4)
MORPHOLOGY BLD-IMP: NORMAL
NEUTROPHILS # BLD AUTO: 2.66 K/UL (ref 1.82–7.42)
NEUTROPHILS NFR BLD: 79.3 % (ref 44–72)
NITRITE UR QL STRIP.AUTO: NEGATIVE
NRBC # BLD AUTO: 0 K/UL
NRBC BLD-RTO: 0 /100 WBC (ref 0–0.2)
NT-PROBNP SERPL IA-MCNC: 1265 PG/ML (ref 0–125)
OVALOCYTES BLD QL SMEAR: NORMAL
PH UR STRIP.AUTO: 7 [PH] (ref 5–8)
PHOSPHATE SERPL-MCNC: 4 MG/DL (ref 2.5–4.5)
PLATELET # BLD AUTO: 61 K/UL (ref 164–446)
PLATELET BLD QL SMEAR: NORMAL
PLATELETS.RETICULATED NFR BLD AUTO: 2.6 % (ref 0.6–13.1)
PMV BLD AUTO: 10.4 FL (ref 9–12.9)
POIKILOCYTOSIS BLD QL SMEAR: NORMAL
POTASSIUM SERPL-SCNC: 4.6 MMOL/L (ref 3.6–5.5)
PRODUCT TYPE UPROD: NORMAL
PROT SERPL-MCNC: 7.3 G/DL (ref 6–8.2)
PROT UR QL STRIP: NEGATIVE MG/DL
RBC # BLD AUTO: 1.6 M/UL (ref 4.7–6.1)
RBC # URNS HPF: ABNORMAL /HPF
RBC BLD AUTO: PRESENT
RBC UR QL AUTO: NEGATIVE
RH BLD: NORMAL
SODIUM SERPL-SCNC: 136 MMOL/L (ref 135–145)
SP GR UR STRIP.AUTO: 1.01
T4 FREE SERPL-MCNC: 1.61 NG/DL (ref 0.93–1.7)
TIBC SERPL-MCNC: 211 UG/DL (ref 250–450)
TROPONIN T SERPL-MCNC: 17 NG/L (ref 6–19)
TSH SERPL DL<=0.005 MIU/L-ACNC: 0.09 UIU/ML (ref 0.38–5.33)
UIBC SERPL-MCNC: 144 UG/DL (ref 110–370)
UNIT STATUS USTAT: NORMAL
UROBILINOGEN UR STRIP.AUTO-MCNC: 0.2 MG/DL
WBC # BLD AUTO: 3.4 K/UL (ref 4.8–10.8)
WBC #/AREA URNS HPF: ABNORMAL /HPF

## 2024-06-18 PROCEDURE — 99285 EMERGENCY DEPT VISIT HI MDM: CPT

## 2024-06-18 PROCEDURE — 83880 ASSAY OF NATRIURETIC PEPTIDE: CPT

## 2024-06-18 PROCEDURE — 86923 COMPATIBILITY TEST ELECTRIC: CPT

## 2024-06-18 PROCEDURE — 83735 ASSAY OF MAGNESIUM: CPT

## 2024-06-18 PROCEDURE — 86850 RBC ANTIBODY SCREEN: CPT

## 2024-06-18 PROCEDURE — 71045 X-RAY EXAM CHEST 1 VIEW: CPT

## 2024-06-18 PROCEDURE — 85055 RETICULATED PLATELET ASSAY: CPT

## 2024-06-18 PROCEDURE — 86901 BLOOD TYPING SEROLOGIC RH(D): CPT

## 2024-06-18 PROCEDURE — 84439 ASSAY OF FREE THYROXINE: CPT

## 2024-06-18 PROCEDURE — P9016 RBC LEUKOCYTES REDUCED: HCPCS

## 2024-06-18 PROCEDURE — 80053 COMPREHEN METABOLIC PANEL: CPT

## 2024-06-18 PROCEDURE — 86900 BLOOD TYPING SEROLOGIC ABO: CPT

## 2024-06-18 PROCEDURE — 84100 ASSAY OF PHOSPHORUS: CPT

## 2024-06-18 PROCEDURE — 770004 HCHG ROOM/CARE - ONCOLOGY PRIVATE *

## 2024-06-18 PROCEDURE — 84443 ASSAY THYROID STIM HORMONE: CPT

## 2024-06-18 PROCEDURE — 700102 HCHG RX REV CODE 250 W/ 637 OVERRIDE(OP): Performed by: INTERNAL MEDICINE

## 2024-06-18 PROCEDURE — 30233N1 TRANSFUSION OF NONAUTOLOGOUS RED BLOOD CELLS INTO PERIPHERAL VEIN, PERCUTANEOUS APPROACH: ICD-10-PCS | Performed by: EMERGENCY MEDICINE

## 2024-06-18 PROCEDURE — 93005 ELECTROCARDIOGRAM TRACING: CPT

## 2024-06-18 PROCEDURE — 36415 COLL VENOUS BLD VENIPUNCTURE: CPT

## 2024-06-18 PROCEDURE — 85025 COMPLETE CBC W/AUTO DIFF WBC: CPT

## 2024-06-18 PROCEDURE — 82728 ASSAY OF FERRITIN: CPT

## 2024-06-18 PROCEDURE — 83550 IRON BINDING TEST: CPT

## 2024-06-18 PROCEDURE — 36430 TRANSFUSION BLD/BLD COMPNT: CPT

## 2024-06-18 PROCEDURE — 99223 1ST HOSP IP/OBS HIGH 75: CPT | Performed by: INTERNAL MEDICINE

## 2024-06-18 PROCEDURE — 84484 ASSAY OF TROPONIN QUANT: CPT

## 2024-06-18 PROCEDURE — A9270 NON-COVERED ITEM OR SERVICE: HCPCS | Performed by: INTERNAL MEDICINE

## 2024-06-18 PROCEDURE — 93005 ELECTROCARDIOGRAM TRACING: CPT | Performed by: EMERGENCY MEDICINE

## 2024-06-18 PROCEDURE — 83540 ASSAY OF IRON: CPT

## 2024-06-18 PROCEDURE — 81001 URINALYSIS AUTO W/SCOPE: CPT

## 2024-06-18 RX ORDER — ISOSORBIDE MONONITRATE 30 MG/1
15 TABLET, EXTENDED RELEASE ORAL EVERY MORNING
Status: ON HOLD | COMMUNITY
End: 2024-06-21

## 2024-06-18 RX ORDER — TAMSULOSIN HYDROCHLORIDE 0.4 MG/1
0.4 CAPSULE ORAL EVERY MORNING
Status: DISCONTINUED | OUTPATIENT
Start: 2024-06-19 | End: 2024-06-21 | Stop reason: HOSPADM

## 2024-06-18 RX ORDER — POTASSIUM CHLORIDE 20 MEQ/1
20 TABLET, EXTENDED RELEASE ORAL 2 TIMES DAILY
Status: DISCONTINUED | OUTPATIENT
Start: 2024-06-18 | End: 2024-06-21 | Stop reason: HOSPADM

## 2024-06-18 RX ORDER — CARBOXYMETHYLCELLULOSE SODIUM 5 MG/ML
1 SOLUTION/ DROPS OPHTHALMIC PRN
Status: DISCONTINUED | OUTPATIENT
Start: 2024-06-18 | End: 2024-06-21 | Stop reason: HOSPADM

## 2024-06-18 RX ORDER — ATORVASTATIN CALCIUM 80 MG/1
80 TABLET, FILM COATED ORAL NIGHTLY
Status: DISCONTINUED | OUTPATIENT
Start: 2024-06-18 | End: 2024-06-21 | Stop reason: HOSPADM

## 2024-06-18 RX ORDER — OXYBUTYNIN CHLORIDE 5 MG/1
5 TABLET, EXTENDED RELEASE ORAL DAILY
Status: DISCONTINUED | OUTPATIENT
Start: 2024-06-19 | End: 2024-06-21 | Stop reason: HOSPADM

## 2024-06-18 RX ORDER — LEVOTHYROXINE SODIUM 0.12 MG/1
125 TABLET ORAL
COMMUNITY

## 2024-06-18 RX ORDER — IPRATROPIUM BROMIDE AND ALBUTEROL SULFATE 2.5; .5 MG/3ML; MG/3ML
3 SOLUTION RESPIRATORY (INHALATION)
Status: DISCONTINUED | OUTPATIENT
Start: 2024-06-18 | End: 2024-06-21 | Stop reason: HOSPADM

## 2024-06-18 RX ORDER — LEVOTHYROXINE SODIUM 0.12 MG/1
125 TABLET ORAL
Status: DISCONTINUED | OUTPATIENT
Start: 2024-06-19 | End: 2024-06-20

## 2024-06-18 RX ORDER — GAUZE BANDAGE 2" X 2"
100 BANDAGE TOPICAL DAILY
Status: DISCONTINUED | OUTPATIENT
Start: 2024-06-18 | End: 2024-06-21 | Stop reason: HOSPADM

## 2024-06-18 RX ORDER — ISOSORBIDE MONONITRATE 30 MG/1
15 TABLET, EXTENDED RELEASE ORAL EVERY MORNING
Status: DISCONTINUED | OUTPATIENT
Start: 2024-06-19 | End: 2024-06-21 | Stop reason: HOSPADM

## 2024-06-18 RX ORDER — OXYBUTYNIN CHLORIDE 5 MG/1
5 TABLET, EXTENDED RELEASE ORAL DAILY
COMMUNITY

## 2024-06-18 RX ORDER — ATENOLOL 50 MG/1
25 TABLET ORAL EVERY MORNING
Status: DISCONTINUED | OUTPATIENT
Start: 2024-06-19 | End: 2024-06-21 | Stop reason: HOSPADM

## 2024-06-18 RX ORDER — DOCUSATE CALCIUM 240 MG
240 CAPSULE ORAL
Status: DISCONTINUED | OUTPATIENT
Start: 2024-06-18 | End: 2024-06-21 | Stop reason: HOSPADM

## 2024-06-18 RX ADMIN — Medication 100 MG: at 18:38

## 2024-06-18 RX ADMIN — POTASSIUM CHLORIDE 20 MEQ: 1500 TABLET, EXTENDED RELEASE ORAL at 18:38

## 2024-06-18 RX ADMIN — THERA TABS 1 TABLET: TAB at 18:38

## 2024-06-18 RX ADMIN — ATORVASTATIN CALCIUM 80 MG: 80 TABLET, FILM COATED ORAL at 20:22

## 2024-06-18 ASSESSMENT — COPD QUESTIONNAIRES
DO YOU EVER COUGH UP ANY MUCUS OR PHLEGM?: NO/ONLY WITH OCCASIONAL COLDS OR INFECTIONS
HAVE YOU SMOKED AT LEAST 100 CIGARETTES IN YOUR ENTIRE LIFE: YES
DURING THE PAST 4 WEEKS HOW MUCH DID YOU FEEL SHORT OF BREATH: NONE/LITTLE OF THE TIME
COPD SCREENING SCORE: 4

## 2024-06-18 ASSESSMENT — PAIN DESCRIPTION - PAIN TYPE: TYPE: ACUTE PAIN

## 2024-06-18 ASSESSMENT — FIBROSIS 4 INDEX: FIB4 SCORE: 2.8

## 2024-06-18 ASSESSMENT — ENCOUNTER SYMPTOMS: WEAKNESS: 1

## 2024-06-18 NOTE — ED NOTES
Med rec updated and complete. Allergies reviewed. Pt requested  that I place call to his wife.  Wife confirmed medications and last doses taken.   No antibiotic use  in last  30 days.  No anticoagulant medications.    [phone]

## 2024-06-18 NOTE — ED PROVIDER NOTES
ED PHYSICIAN NOTE    CHIEF COMPLAINT  Chief Complaint   Patient presents with    Weakness     Patient reports generalized weakness, worsening over the last few weeks.    Shortness of Breath     Patient reports SOB worsening over the last few weeks. Patient wears 2L O2 at baseline       EXTERNAL RECORDS REVIEWED  Inpatient Notes patient admitted to this hospital with eye pain.  Patient has thyroid ophthalmoplegia and orbitopathy.    HPI/ROS      King Alegria is a 79 y.o. male who presents with generalized weakness.  Has been worse over the last 1 week although he is chronically weak and short of breath.  No focal weakness numbness.  He feels like he cannot stand up and walk around because he is so weak.  He has not had chest pain, abdominal pain, dysuria, hematuria.  No black or bloody stool.  No fevers or illness.    PAST MEDICAL HISTORY  Past Medical History:   Diagnosis Date    Arthritis     Chronic respiratory failure with hypoxia ([facility]) 09/15/2023    Uses 2lpm at rest, 3lpm with ambulation and at night    COPD (chronic obstructive pulmonary disease) ([facility])     Former smoker 09/15/2023    Heart attack ([facility]) 1998    Heart attack ([facility]) 1999    Had stents placed last month    Hyperlipidemia     Hypertension     Lung nodule 09/15/2023    Pancytopenia ([facility]) 09/15/2023    Prostate cancer ([facility]) 2 years ago    Had seeds    Rheumatoid arthritis ([facility]) 12/15/2015       SOCIAL HISTORY  Social History     Tobacco Use    Smoking status: Former     Current packs/day: 0.00     Average packs/day: 2.0 packs/day for 50.0 years (100.0 ttl pk-yrs)     Types: Cigarettes     Start date: 11/30/1963     Quit date: 11/30/2013     Years since quitting: 10.5     Passive exposure: Never    Smokeless tobacco: Never   Vaping Use    Vaping status: Never Used   Substance Use Topics    Alcohol use: No     Comment: ETOH abue in the past    Drug use: No       CURRENT MEDICATIONS  Home Medications       Reviewed by Kira Fournier R.N. (Registered  "Nurse) on 06/18/24 at 1324  Med List Status: Partial     Medication Last Dose Status   atenolol (TENORMIN) 25 MG Tab  Active   atorvastatin (LIPITOR) 80 MG tablet  Active   cetirizine (ZYRTEC) 10 MG Tab  Active   cycloSPORINE (RESTASIS) 0.05 % ophthalmic emulsion  Active   cycloSPORINE (RESTASIS) 0.05 % ophthalmic emulsion  Active   docusate calcium (SURFAK) 240 MG Cap  Active   isosorbide mononitrate SR (IMDUR) 60 MG TABLET SR 24 HR  Active   levothyroxine (SYNTHROID) 150 MCG Tab  Active   levothyroxine (SYNTHROID) 150 MCG Tab  Active   Lifitegrast 5 % Solution  Active   methylPREDNISolone (MEDROL DOSEPAK) 4 MG Tablet Therapy Pack  Active   methylPREDNISolone (MEDROL DOSEPAK) 4 MG Tablet Therapy Pack  Active   oxybutynin (DITROPAN) 5 MG Tab  Active   potassium chloride SA (KDUR) 20 MEQ Tab CR  Active   predniSONE (DELTASONE) 10 MG Tab  Active   tamsulosin (FLOMAX) 0.4 MG capsule  Active                  Audit from Redirected Encounters    **Home medications have not yet been reviewed for this encounter**         ALLERGIES  No Known Allergies    PHYSICAL EXAM  VITAL SIGNS: /56   Pulse 60   Temp 36.4 °C (97.6 °F) (Temporal)   Resp 16   Ht 1.727 m (5' 8\")   Wt 54.4 kg (120 lb)   SpO2 100%   BMI 18.25 kg/m²    Constitutional: Awake and alert.  Frail-appearing elderly male  HENT: Normal inspection  Eyes: Pale conjunctiva  Neck: Grossly normal range of motion.  Cardiovascular: Normal heart rate, Normal rhythm.  Symmetric peripheral pulses.   Thorax & Lungs: No respiratory distress, No wheezing, No rales, No rhonchi, No chest tenderness.   Abdomen: Bowel sounds normal, soft, non-distended, nontender, no mass  Skin: No obvious rash.  Back: No tenderness, No CVA tenderness.   Extremities: No clubbing, cyanosis, edema, no Homans or cords.  Neurologic: Grossly normal   Psychiatric: Normal for situation     DIAGNOSTIC STUDIES / PROCEDURES  LABS/EKG  Results for orders placed or performed during the hospital " encounter of 06/18/24   CBC with Differential   Result Value Ref Range    WBC 3.4 (L) 4.8 - 10.8 K/uL    RBC 1.60 (L) 4.70 - 6.10 M/uL    Hemoglobin 6.4 (L) 14.0 - 18.0 g/dL    Hematocrit 19.8 (L) 42.0 - 52.0 %    .8 (H) 81.4 - 97.8 fL    MCH 40.0 (H) 27.0 - 33.0 pg    MCHC 32.3 32.3 - 36.5 g/dL    RDW 77.6 (H) 35.9 - 50.0 fL    Platelet Count 61 (L) 164 - 446 K/uL    MPV 10.4 9.0 - 12.9 fL    Neutrophils-Polys 79.30 (H) 44.00 - 72.00 %    Lymphocytes 9.60 (L) 22.00 - 41.00 %    Monocytes 9.60 0.00 - 13.40 %    Eosinophils 0.00 0.00 - 6.90 %    Basophils 0.30 0.00 - 1.80 %    Immature Granulocytes 1.20 (H) 0.00 - 0.90 %    Nucleated RBC 0.00 0.00 - 0.20 /100 WBC    Neutrophils (Absolute) 2.66 1.82 - 7.42 K/uL    Lymphs (Absolute) 0.32 (L) 1.00 - 4.80 K/uL    Monos (Absolute) 0.32 0.00 - 0.85 K/uL    Eos (Absolute) 0.00 0.00 - 0.51 K/uL    Baso (Absolute) 0.01 0.00 - 0.12 K/uL    Immature Granulocytes (abs) 0.04 0.00 - 0.11 K/uL    NRBC (Absolute) 0.00 K/uL    Anisocytosis 1+     Macrocytosis 2+ (A)    Comp Metabolic Panel   Result Value Ref Range    Sodium 136 135 - 145 mmol/L    Potassium 4.6 3.6 - 5.5 mmol/L    Chloride 102 96 - 112 mmol/L    Co2 21 20 - 33 mmol/L    Anion Gap 13.0 7.0 - 16.0    Glucose 95 65 - 99 mg/dL    Bun 14 8 - 22 mg/dL    Creatinine 0.60 0.50 - 1.40 mg/dL    Calcium 9.3 8.5 - 10.5 mg/dL    Correct Calcium 9.5 8.5 - 10.5 mg/dL    AST(SGOT) 17 12 - 45 U/L    ALT(SGPT) 26 2 - 50 U/L    Alkaline Phosphatase 76 30 - 99 U/L    Total Bilirubin 0.4 0.1 - 1.5 mg/dL    Albumin 3.7 3.2 - 4.9 g/dL    Total Protein 7.3 6.0 - 8.2 g/dL    Globulin 3.6 (H) 1.9 - 3.5 g/dL    A-G Ratio 1.0 g/dL   URINALYSIS CULTURE, IF INDICATED    Specimen: Urine, Clean Catch   Result Value Ref Range    Color Yellow     Character Clear     Specific Gravity 1.015 <1.035    Ph 7.0 5.0 - 8.0    Glucose Negative Negative mg/dL    Ketones Negative Negative mg/dL    Protein Negative Negative mg/dL    Bilirubin Negative  Negative    Urobilinogen, Urine 0.2 Negative    Nitrite Negative Negative    Leukocyte Esterase Trace (A) Negative    Occult Blood Negative Negative    Micro Urine Req Microscopic    FREE THYROXINE   Result Value Ref Range    Free T-4 1.61 0.93 - 1.70 ng/dL   proBrain Natriuretic Peptide, NT   Result Value Ref Range    NT-proBNP 1265 (H) 0 - 125 pg/mL   MAGNESIUM   Result Value Ref Range    Magnesium 2.1 1.5 - 2.5 mg/dL   PHOSPHORUS   Result Value Ref Range    Phosphorus 4.0 2.5 - 4.5 mg/dL   TROPONIN   Result Value Ref Range    Troponin T 17 6 - 19 ng/L   ESTIMATED GFR   Result Value Ref Range    GFR (CKD-EPI) 99 >60 mL/min/1.73 m 2   COD - Adult (Type and Screen)   Result Value Ref Range    ABO Grouping Only A     Rh Grouping Only POS     Antibody Screen-Cod NEG     Component R       R99                 Red Cells, LR       [id number]   issued       06/18/24   16:29      Product Type R99     Dispense Status issued     Unit Number (Barcoded) [id number]     Product Code (Barcoded) O7602H97     Blood Type (Barcoded) 0600    TSH   Result Value Ref Range    TSH 0.089 (L) 0.380 - 5.330 uIU/mL   PLATELET ESTIMATE   Result Value Ref Range    Plt Estimation Decreased    MORPHOLOGY   Result Value Ref Range    RBC Morphology Present     Poikilocytosis 1+     Ovalocytes 1+    PERIPHERAL SMEAR REVIEW   Result Value Ref Range    Peripheral Smear Review see below    IMMATURE PLT FRACTION   Result Value Ref Range    Imm. Plt Fraction 2.6 0.6 - 13.1 %   DIFFERENTIAL COMMENT   Result Value Ref Range    Comments-Diff see below    IRON/TOTAL IRON BIND   Result Value Ref Range    Iron 67 50 - 180 ug/dL    Total Iron Binding 211 (L) 250 - 450 ug/dL    Unsat Iron Binding 144 110 - 370 ug/dL    % Saturation 32 15 - 55 %   FERRITIN   Result Value Ref Range    Ferritin 1516.0 (H) 22.0 - 322.0 ng/mL   URINE MICROSCOPIC (W/UA)   Result Value Ref Range    WBC 5-10 (A) /hpf    RBC 0-2 (A) /hpf    Bacteria Moderate (A) None /hpf     Epithelial Cells Negative /hpf    Hyaline Cast 0-2 /lpf   EKG   Result Value Ref Range    Report       [facility] Emergency Dept.    Test Date:  2024  Pt Name:    DOUGLAS KHAN                Department: ER  MRN:        [id number]                      Room:        27  Gender:     Male                         Technician:  :        [id number]                   Requested By:ER TRIAGE PROTOCOL  Order #:    [id number]                    Reading MD: LINO FLOYD MD    Measurements  Intervals                                Axis  Rate:       58                           P:          0  CT:         0                            QRS:        64  QRSD:       103                          T:          -59  QT:         441  QTc:        434    Interpretive Statements  Sinus rhythm with first-degree AV block  Nonspecific T abnormalities, inferior leads  Compared to ECG 2023 22:17:47  Ventricular premature complex(es) no longer present  Possible ischemia no longer present  T-wave abnormality still present  Electronically Signed On 2024 14:16:30 PDT by LINO CAMACHO MD        I have independently interpreted this EKG as documented above    Rhythm strip interpretation-sinus rhythm    RADIOLOGY  I have independently interpreted the diagnostic imaging associated with this visit and am waiting the final reading from the radiologist.   My preliminary interpretation is as follows: Chest x-ray without focal infiltrate      COURSE & MEDICAL DECISION MAKING    INITIAL ASSESSMENT, COURSE AND PLAN  Care Narrative: Patient presents with generalized weakness.  He appears pale weak tired.  No focal weakness numbness to suggest CVA.  No chest pain shortness of breath.  History of hypothyroidism.  History of myelodysplastic syndrome.  Broad evaluation was undertaken.    Patient is identified to have critical anemia.  Ordered transfusion of 1 unit packed red blood cell.  Vital signs stable.  Patient will  need to be admitted to the hospital for further treatment.  Hospitalist consulted    Discussed with Dr. Moore    I have explained to the patient the risks and benefits of transfusion of blood products.  This includes, as appropriate, the risk of mild allergic reaction, hemolytic reaction, transfusion-associated lung injury, febrile reactions, circulatory or iron overload, and infection.    We discussed possible alternatives and their risks, including directed donation, autologous transfusion, and no transfusion, including IV or oral iron supplementation, as appropriate.  I believe the patient understands the risks and benefits and was able to express understanding.         DISPOSITION AND DISCUSSIONS  I have discussed management of the patient with the following physicians and COLLIN's:  as noted above        FINAL IMPRESSION  1.  Critical anemia    CRITICAL CARE  The very real possibilty of a deterioration of this patient's condition required the highest level of my preparedness for sudden, emergent intervention.  I provided critical care services, which included medication orders, frequent reevaluations of the patient's condition and response to treatment, ordering and reviewing test results, and discussing the case with various consultants.  The critical care time associated with the care of the patient was 30 minutes. Review chart for interventions. This time is exclusive of any other billable procedures.       This dictation was created using voice recognition software. The accuracy of the dictation is limited to the abilities of the software. I expect there may be some errors of grammar and possibly content. The nursing notes were reviewed and certain aspects of this information were incorporated into this note.    Electronically signed by: Srikanth Bernal M.D., 6/18/2024

## 2024-06-18 NOTE — H&P
Hospital Medicine History & Physical Note    Date of Service  6/18/2024    Primary Care Physician  Pcp Not In Computer    Consultants      Code Status  Prior    Chief Complaint  Chief Complaint   Patient presents with    Weakness     Patient reports generalized weakness, worsening over the last few weeks.    Shortness of Breath     Patient reports SOB worsening over the last few weeks. Patient wears 2L O2 at baseline       History of Presenting Illness  King Alegria is a 79 y.o. male who presented 6/18/2024 with Weakness (Patient reports generalized weakness, worsening over the last few weeks.) and Shortness of Breath (Patient reports SOB worsening over the last few weeks. Patient wears 2L O2 at baseline)    79 year old frail male, [facility] with [facility] of CAD, COPD, rheumatoid arthritis, HTN, HLD, MDS/aplastic anemia [facility] presented 6/18/2024 for weakness. He has not followed up with [facility] yet for his MDS. He denies any bleeding or melena. He is otherwise a poor historian as he does not know what MDS means nor treatment modalities.   At the ED he is afebrile and hemodynamically stable.   Hb 6.4.   CXR appears clear. Pancytopenic with leukopenia and thrombocytopenia.   Admitting for MDS exacerbation and symptomatic anemia. Ordered blood transfusion, keep Hb above 8. CONSULT HemOnc TOMORROW, he may be a candidate for E. Poietin. He should follow up with Oncology for other medications like Luspaterecept.   When I saw King Alegria at the ED, no acute distress.     I discussed the plan of care with patient and bedside RN.    Review of Systems  Review of Systems   Neurological:  Positive for weakness.       Past Medical History   has a past medical history of Arthritis, Chronic respiratory failure with hypoxia ([facility]) (09/15/2023), COPD (chronic obstructive pulmonary disease) ([facility]), Former smoker (09/15/2023), Heart attack ([facility]) (1998), Heart attack ([facility]) (1999), Hyperlipidemia,  Hypertension, Lung nodule (09/15/2023), Pancytopenia ([facility]) (09/15/2023), Prostate cancer ([facility]) (2 years ago), and Rheumatoid arthritis ([facility]) (12/15/2015).    Surgical History   has no past surgical history on file.     Family History  family history includes Cancer in his mother.   Family history reviewed with patient. There is no family history that is pertinent to the chief complaint.     Social History   reports that he quit smoking about 10 years ago. His smoking use included cigarettes. He started smoking about 60 years ago. He has a 100 pack-year smoking history. He has never been exposed to tobacco smoke. He has never used smokeless tobacco. He reports that he does not drink alcohol and does not use drugs.    Allergies  No Known Allergies    Medications  Prior to Admission Medications   Prescriptions Last Dose Informant Patient Reported? Taking?   Lifitegrast 5 % Solution   No No   Sig: Administer 1 Drop into affected eye(s) 2 times a day. Both eyes   atenolol (TENORMIN) 25 MG Tab  Significant Other Yes No   Sig: Take 25 mg by mouth every morning.   atorvastatin (LIPITOR) 80 MG tablet  Significant Other Yes No   Sig: Take 80 mg by mouth every evening.   cetirizine (ZYRTEC) 10 MG Tab  Significant Other Yes No   Sig: Take 10 mg by mouth every morning.   cycloSPORINE (RESTASIS) 0.05 % ophthalmic emulsion   No No   Sig: Administer 1 Drop into both eyes 2 times a day.   cycloSPORINE (RESTASIS) 0.05 % ophthalmic emulsion   No No   Sig: Administer 1 Drop into both eyes 2 times a day.   docusate calcium (SURFAK) 240 MG Cap  Significant Other Yes No   Sig: Take 240 mg by mouth at bedtime.   isosorbide mononitrate SR (IMDUR) 60 MG TABLET SR 24 HR  Significant Other Yes No   Sig: Take 60 mg by mouth every morning.   levothyroxine (SYNTHROID) 150 MCG Tab   No No   Sig: Take 1 Tablet by mouth every morning on an empty stomach.   levothyroxine (SYNTHROID) 150 MCG Tab   No No   Sig: Take 1 Tablet by mouth every morning on  an empty stomach.   methylPREDNISolone (MEDROL DOSEPAK) 4 MG Tablet Therapy Pack   No No   Sig: Follow schedule on package instructions.   methylPREDNISolone (MEDROL DOSEPAK) 4 MG Tablet Therapy Pack   No No   Sig: Follow schedule on package instructions.   oxybutynin (DITROPAN) 5 MG Tab  Significant Other Yes No   Sig: Take 5 mg by mouth at bedtime.   potassium chloride SA (KDUR) 20 MEQ Tab CR  Significant Other Yes No   Sig: Take 20 mEq by mouth 2 times a day.   predniSONE (DELTASONE) 10 MG Tab   No No   Sig: Take 4 Tablets by mouth every day.   tamsulosin (FLOMAX) 0.4 MG capsule  Significant Other Yes No   Sig: Take 0.4 mg by mouth every morning.      Facility-Administered Medications: None       Physical Exam  Temp:  [36.4 °C (97.6 °F)] 36.4 °C (97.6 °F)  Pulse:  [56-80] 56  Resp:  [14-25] 19  BP: (105-121)/(47-56) 109/55  SpO2:  [99 %-100 %] 100 %  Blood Pressure : 109/55   Temperature: 36.4 °C (97.6 °F)   Pulse: (!) 56   Respiration: 19   Pulse Oximetry: 100 %       Physical Exam  Vitals and nursing note reviewed.   Constitutional:       Comments: Thin   HENT:      Head: Normocephalic and atraumatic.      Comments: Temporal wasting     Right Ear: External ear normal.      Left Ear: External ear normal.      Nose: Nose normal.      Mouth/Throat:      Mouth: Mucous membranes are moist.   Eyes:      General: No scleral icterus.     Conjunctiva/sclera: Conjunctivae normal.   Cardiovascular:      Rate and Rhythm: Normal rate and regular rhythm.      Heart sounds: No murmur heard.     No friction rub. No gallop.   Pulmonary:      Effort: Pulmonary effort is normal.      Breath sounds: Normal breath sounds.   Abdominal:      General: Abdomen is flat. Bowel sounds are normal. There is no distension.      Palpations: Abdomen is soft.      Tenderness: There is no abdominal tenderness. There is no guarding.   Musculoskeletal:         General: Normal range of motion.      Cervical back: Normal range of motion and neck  supple.   Skin:     General: Skin is warm.      Coloration: Skin is pale.   Neurological:      Mental Status: He is alert and oriented to person, place, and time. Mental status is at baseline.   Psychiatric:         Mood and Affect: Mood normal.         Behavior: Behavior normal.         Thought Content: Thought content normal.         Judgment: Judgment normal.         Laboratory:  Recent Labs     06/18/24  1354   WBC 3.4*   RBC 1.60*   HEMOGLOBIN 6.4*   HEMATOCRIT 19.8*   .8*   MCH 40.0*   MCHC 32.3   RDW 77.6*   PLATELETCT 61*   MPV 10.4     Recent Labs     06/18/24  1354   SODIUM 136   POTASSIUM 4.6   CHLORIDE 102   CO2 21   GLUCOSE 95   BUN 14   CREATININE 0.60   CALCIUM 9.3     Recent Labs     06/18/24  1354   ALTSGPT 26   ASTSGOT 17   ALKPHOSPHAT 76   TBILIRUBIN 0.4   GLUCOSE 95         Recent Labs     06/18/24  1354   NTPROBNP 1265*         Recent Labs     06/18/24  1354   TROPONINT 17       Imaging:  DX-CHEST-PORTABLE (1 VIEW)   Final Result      1. No acute findings.   2. Other stable chronic degenerative and posttraumatic changes.          X-Ray:  I have personally reviewed the images and compared with prior images.    Assessment/Plan:  Justification for Admission Status  I anticipate this patient will require at least two midnights for appropriate medical management, necessitating inpatient admission because anemia and pancytopenia      * Symptomatic anemia- (present on admission)  Assessment & Plan  Transfuse  Consult Hematology TOMORROW. Consider Epoietin  Ordered iron panel and hemoccult  [facility]  [facility] can benefit from spaterecept    MDS (myelodysplastic syndrome) ([facility])  Assessment & Plan  As above    COPD (chronic obstructive pulmonary disease) ([facility])- (present on admission)  Assessment & Plan  O2 and resp per protocol        VTE prophylaxis: SCDs/TEDs

## 2024-06-18 NOTE — ED NOTES
RBCs started per order with 2 RN verification. Consent signed and placed in chart. Q15 minute VS checks in place. Patient educated on s/s of transfusion reaction. Patient verbalizes understanding

## 2024-06-18 NOTE — ED TRIAGE NOTES
"Chief Complaint   Patient presents with    Weakness     Patient reports generalized weakness, worsening over the last few weeks.    Shortness of Breath     Patient reports SOB worsening over the last few weeks. Patient wears 2L O2 at baseline     80 yo male bib EMS for above. Patient reports \"I've had these symptoms for 76 years\".    Patient given 4mg Zofran ODT en route.    Protocol ordered.    /56   Pulse 60   Temp 36.4 °C (97.6 °F) (Temporal)   Resp 16   Ht 1.727 m (5' 8\")   Wt 54.4 kg (120 lb)   SpO2 100%   BMI 18.25 kg/m²     "

## 2024-06-18 NOTE — ED NOTES
ERP at bedside   Left LE Active ROM was WFL (within functional limits)/Right LE Passive ROM was WFL  (within functional limits)

## 2024-06-19 ENCOUNTER — HOSPITAL ENCOUNTER (OUTPATIENT)
Facility: MEDICAL CENTER | Age: 79
DRG: 811 | End: 2024-06-19
Attending: STUDENT IN AN ORGANIZED HEALTH CARE EDUCATION/TRAINING PROGRAM | Admitting: HOSPITALIST
Payer: COMMERCIAL

## 2024-06-19 PROBLEM — E03.9 ACQUIRED HYPOTHYROIDISM: Status: ACTIVE | Noted: 2024-06-19

## 2024-06-19 PROBLEM — E43 SEVERE PROTEIN-CALORIE MALNUTRITION (HCC): Status: ACTIVE | Noted: 2024-06-19

## 2024-06-19 LAB
ANION GAP SERPL CALC-SCNC: 9 MMOL/L (ref 7–16)
BUN SERPL-MCNC: 14 MG/DL (ref 8–22)
CALCIUM SERPL-MCNC: 9.5 MG/DL (ref 8.5–10.5)
CHLORIDE SERPL-SCNC: 102 MMOL/L (ref 96–112)
CO2 SERPL-SCNC: 23 MMOL/L (ref 20–33)
CREAT SERPL-MCNC: 0.49 MG/DL (ref 0.5–1.4)
ERYTHROCYTE [DISTWIDTH] IN BLOOD BY AUTOMATED COUNT: 99.6 FL (ref 35.9–50)
GFR SERPLBLD CREATININE-BSD FMLA CKD-EPI: 106 ML/MIN/1.73 M 2
GLUCOSE SERPL-MCNC: 86 MG/DL (ref 65–99)
HCT VFR BLD AUTO: 24.2 % (ref 42–52)
HGB BLD-MCNC: 8 G/DL (ref 14–18)
MCH RBC QN AUTO: 37 PG (ref 27–33)
MCHC RBC AUTO-ENTMCNC: 33.1 G/DL (ref 32.3–36.5)
MCV RBC AUTO: 112 FL (ref 81.4–97.8)
PLATELET # BLD AUTO: 71 K/UL (ref 164–446)
PLATELETS.RETICULATED NFR BLD AUTO: 2.6 % (ref 0.6–13.1)
PMV BLD AUTO: 9.6 FL (ref 9–12.9)
POTASSIUM SERPL-SCNC: 4.5 MMOL/L (ref 3.6–5.5)
RBC # BLD AUTO: 2.16 M/UL (ref 4.7–6.1)
SODIUM SERPL-SCNC: 134 MMOL/L (ref 135–145)
WBC # BLD AUTO: 4.3 K/UL (ref 4.8–10.8)

## 2024-06-19 PROCEDURE — 770004 HCHG ROOM/CARE - ONCOLOGY PRIVATE *

## 2024-06-19 PROCEDURE — 85027 COMPLETE CBC AUTOMATED: CPT

## 2024-06-19 PROCEDURE — A9270 NON-COVERED ITEM OR SERVICE: HCPCS | Performed by: INTERNAL MEDICINE

## 2024-06-19 PROCEDURE — 85055 RETICULATED PLATELET ASSAY: CPT

## 2024-06-19 PROCEDURE — 36415 COLL VENOUS BLD VENIPUNCTURE: CPT

## 2024-06-19 PROCEDURE — 700102 HCHG RX REV CODE 250 W/ 637 OVERRIDE(OP): Performed by: INTERNAL MEDICINE

## 2024-06-19 PROCEDURE — 99221 1ST HOSP IP/OBS SF/LOW 40: CPT | Mod: GC | Performed by: STUDENT IN AN ORGANIZED HEALTH CARE EDUCATION/TRAINING PROGRAM

## 2024-06-19 PROCEDURE — 80048 BASIC METABOLIC PNL TOTAL CA: CPT

## 2024-06-19 PROCEDURE — 99285 EMERGENCY DEPT VISIT HI MDM: CPT

## 2024-06-19 PROCEDURE — 99233 SBSQ HOSP IP/OBS HIGH 50: CPT | Performed by: STUDENT IN AN ORGANIZED HEALTH CARE EDUCATION/TRAINING PROGRAM

## 2024-06-19 RX ADMIN — POTASSIUM CHLORIDE 20 MEQ: 1500 TABLET, EXTENDED RELEASE ORAL at 05:23

## 2024-06-19 RX ADMIN — ATORVASTATIN CALCIUM 80 MG: 80 TABLET, FILM COATED ORAL at 20:49

## 2024-06-19 RX ADMIN — TAMSULOSIN HYDROCHLORIDE 0.4 MG: 0.4 CAPSULE ORAL at 05:24

## 2024-06-19 RX ADMIN — POTASSIUM CHLORIDE 20 MEQ: 1500 TABLET, EXTENDED RELEASE ORAL at 17:18

## 2024-06-19 RX ADMIN — ATENOLOL 25 MG: 50 TABLET ORAL at 05:23

## 2024-06-19 RX ADMIN — OXYBUTYNIN CHLORIDE 5 MG: 5 TABLET, EXTENDED RELEASE ORAL at 05:23

## 2024-06-19 RX ADMIN — THERA TABS 1 TABLET: TAB at 05:23

## 2024-06-19 RX ADMIN — ISOSORBIDE MONONITRATE 15 MG: 30 TABLET, EXTENDED RELEASE ORAL at 06:10

## 2024-06-19 RX ADMIN — LEVOTHYROXINE SODIUM 125 MCG: 0.12 TABLET ORAL at 05:24

## 2024-06-19 RX ADMIN — Medication 100 MG: at 05:24

## 2024-06-19 SDOH — ECONOMIC STABILITY: TRANSPORTATION INSECURITY
IN THE PAST 12 MONTHS, HAS LACK OF RELIABLE TRANSPORTATION KEPT YOU FROM MEDICAL APPOINTMENTS, MEETINGS, WORK OR FROM GETTING THINGS NEEDED FOR DAILY LIVING?: NO

## 2024-06-19 SDOH — ECONOMIC STABILITY: TRANSPORTATION INSECURITY
IN THE PAST 12 MONTHS, HAS THE LACK OF TRANSPORTATION KEPT YOU FROM MEDICAL APPOINTMENTS OR FROM GETTING MEDICATIONS?: NO

## 2024-06-19 ASSESSMENT — SOCIAL DETERMINANTS OF HEALTH (SDOH)
WITHIN THE PAST 12 MONTHS, THE FOOD YOU BOUGHT JUST DIDN'T LAST AND YOU DIDN'T HAVE MONEY TO GET MORE: NEVER TRUE
WITHIN THE LAST YEAR, HAVE YOU BEEN AFRAID OF YOUR PARTNER OR EX-PARTNER?: NO
WITHIN THE LAST YEAR, HAVE TO BEEN RAPED OR FORCED TO HAVE ANY KIND OF SEXUAL ACTIVITY BY YOUR PARTNER OR EX-PARTNER?: NO
WITHIN THE PAST 12 MONTHS, YOU WORRIED THAT YOUR FOOD WOULD RUN OUT BEFORE YOU GOT THE MONEY TO BUY MORE: NEVER TRUE
WITHIN THE LAST YEAR, HAVE YOU BEEN HUMILIATED OR EMOTIONALLY ABUSED IN OTHER WAYS BY YOUR PARTNER OR EX-PARTNER?: NO
IN THE PAST 12 MONTHS, HAS THE ELECTRIC, GAS, OIL, OR WATER COMPANY THREATENED TO SHUT OFF SERVICE IN YOUR HOME?: NO
WITHIN THE LAST YEAR, HAVE YOU BEEN KICKED, HIT, SLAPPED, OR OTHERWISE PHYSICALLY HURT BY YOUR PARTNER OR EX-PARTNER?: NO

## 2024-06-19 ASSESSMENT — ENCOUNTER SYMPTOMS
COUGH: 1
SHORTNESS OF BREATH: 1
FEVER: 0
VOMITING: 0
ABDOMINAL PAIN: 0
NAUSEA: 0

## 2024-06-19 ASSESSMENT — PATIENT HEALTH QUESTIONNAIRE - PHQ9
1. LITTLE INTEREST OR PLEASURE IN DOING THINGS: NOT AT ALL
SUM OF ALL RESPONSES TO PHQ9 QUESTIONS 1 AND 2: 0
1. LITTLE INTEREST OR PLEASURE IN DOING THINGS: NOT AT ALL
SUM OF ALL RESPONSES TO PHQ9 QUESTIONS 1 AND 2: 0
2. FEELING DOWN, DEPRESSED, IRRITABLE, OR HOPELESS: NOT AT ALL
2. FEELING DOWN, DEPRESSED, IRRITABLE, OR HOPELESS: NOT AT ALL

## 2024-06-19 ASSESSMENT — PAIN DESCRIPTION - PAIN TYPE
TYPE: ACUTE PAIN
TYPE: ACUTE PAIN

## 2024-06-19 ASSESSMENT — COGNITIVE AND FUNCTIONAL STATUS - GENERAL
MOBILITY SCORE: 22
TURNING FROM BACK TO SIDE WHILE IN FLAT BAD: A LITTLE
DAILY ACTIVITIY SCORE: 22
SUGGESTED CMS G CODE MODIFIER MOBILITY: CJ
SUGGESTED CMS G CODE MODIFIER DAILY ACTIVITY: CJ
DRESSING REGULAR LOWER BODY CLOTHING: A LITTLE
HELP NEEDED FOR BATHING: A LITTLE
MOVING FROM LYING ON BACK TO SITTING ON SIDE OF FLAT BED: A LITTLE

## 2024-06-19 ASSESSMENT — LIFESTYLE VARIABLES
EVER HAD A DRINK FIRST THING IN THE MORNING TO STEADY YOUR NERVES TO GET RID OF A HANGOVER: NO
TOTAL SCORE: 0
TOTAL SCORE: 0
DOES PATIENT WANT TO STOP DRINKING: NO
ALCOHOL_USE: NO
AVERAGE NUMBER OF DAYS PER WEEK YOU HAVE A DRINK CONTAINING ALCOHOL: 0
TOTAL SCORE: 0
TOTAL SCORE: 0
HAVE PEOPLE ANNOYED YOU BY CRITICIZING YOUR DRINKING: NO
TOTAL SCORE: 0
ON A TYPICAL DAY WHEN YOU DRINK ALCOHOL HOW MANY DRINKS DO YOU HAVE: 0
HAVE PEOPLE ANNOYED YOU BY CRITICIZING YOUR DRINKING: NO
HAVE YOU EVER FELT YOU SHOULD CUT DOWN ON YOUR DRINKING: NO
HAVE YOU EVER FELT YOU SHOULD CUT DOWN ON YOUR DRINKING: NO
HOW MANY TIMES IN THE PAST YEAR HAVE YOU HAD 5 OR MORE DRINKS IN A DAY: 0
CONSUMPTION TOTAL: INCOMPLETE
EVER FELT BAD OR GUILTY ABOUT YOUR DRINKING: NO
EVER FELT BAD OR GUILTY ABOUT YOUR DRINKING: NO
CONSUMPTION TOTAL: NEGATIVE
DOES PATIENT WANT TO STOP DRINKING: NO
EVER HAD A DRINK FIRST THING IN THE MORNING TO STEADY YOUR NERVES TO GET RID OF A HANGOVER: NO
TOTAL SCORE: 0
ALCOHOL_USE: NO

## 2024-06-19 ASSESSMENT — FIBROSIS 4 INDEX: FIB4 SCORE: 4.21

## 2024-06-19 NOTE — ASSESSMENT & PLAN NOTE
Secondary to MDS or possibly new malignancy  Hb stable at 7.6  - [facility]  - Bone marrow biopsy 6/21  - Follow pathology results  - S/p 1 unit RBCs 6/18  - Repeat CBC in a.m.  - Transfuse for Hb less than 7

## 2024-06-19 NOTE — ED NOTES
Bedside report received from off going RN/tech: Kira ANDERSON, assumed care of patient.  POC discussed with patient. Call light within reach, all needs addressed at this time.       Fall risk interventions in place: Move the patient closer to the nurse's station, Patient's personal possessions are with in their safe reach, Place socks on patient, Place fall risk sign on patient's door, and Keep floor surfaces clean and dry (all applicable per [place] Fall risk assessment)   Continuous monitoring: Cardiac Leads, Pulse Ox, or Blood Pressure  IVF/IV medications: Not Applicable   Oxygen: How many liters 2L  Bedside sitter: Not Applicable   Isolation: NA

## 2024-06-19 NOTE — DIETARY
"Nutrition Note: Low BMI noted on admit screen    Day 1 of admit.  King Alegria is a 79 y.o. male admitted with weakness and shortness of breath  History includes CAD, COPD, rheumatoid arthritis, HTN, HLD, MDS/aplastic anemia    Met with patient at bedside.  He stated he has lost approximately 85# in the past seven months.  He stated his appetite is improving and he drinks 3 Ensure per day at home.    Assessment:  Height: 172.7 cm (5' 8\")  Weight: 51.7 kg (114 lb)  Body mass index is 17.33 kg/m².  Diet/Intake: Regular with supplements; eating % so far today    Evaluation:   Labs and medications reviewed - on Thiamine and a multivitamin  Per chart review, weight is down ~12# or 10% since December 2023  Admit weight was stated - need measured weight.  D/W nursing  Per patient report, he has lost 43% in less than a year    Malnutrition Risk: Patient meets ASPEN criteria for sever chronic disease related malnutrition as evidenced by >20% in less than a  year.  Patient also has depressed temple region and pronounced hollowness and dark circles in orbital region.  Severe muscle and fat losses. Messaged MD.      Recommendations/Interventions/Plan:    Supplements added  Encourage intake of meals and supplements  Document intake of all PO as % taken in ADL's to provide interdisciplinary communication across all shifts.   Monitor weight.  Nutrition rep will continue to see patient for ongoing meal and snack preferences.     RD following    "

## 2024-06-19 NOTE — CARE PLAN
The patient is Watcher - Medium risk of patient condition declining or worsening    Shift Goals  Clinical Goals: monitor HGB  Patient Goals: get some sleep    Problem: Knowledge Deficit - Standard  Goal: Patient and family/care givers will demonstrate understanding of plan of care, disease process/condition, diagnostic tests and medications  Outcome: Progressing  Note: Patient will demonstrate an understanding of admit process and POC.     Problem: Fall Risk  Goal: Patient will remain free from falls  Outcome: Progressing  Note: Patient will demonstrate use of call light prior to exiting the bed.

## 2024-06-19 NOTE — ASSESSMENT & PLAN NOTE
Decreased appetite; less than 75% of energy requirement intake; severe loss of muscle mass; severe loss of body fat  -Nutrition following  - Continue supplements  - Monitoring for refeeding syndrome

## 2024-06-19 NOTE — CONSULTS
Consult Note: Heme/Onc      ID  79M with a h/o Myelodysplastic Syndrome who presented with anemia.    HPI    79M Retired  with a h/o Myelodysplastic Syndrome (dx in 2023 by bone biopsy, and ?tx with chemo in the past), CAD (s/p stents in (~2020), Chronic Respiratory Failure (on 2L NC), BPH and OAB, RA, HTN, h/o smoking (1.5 ppd x15 years, quit in ~2014).    He presented with anemia.    -6/18 - Pt was advised to the go the ED at the advice of his Oncologist, and he was ultimately admitted with weakness and SOB which was attributed to pt's anemia with Hgb = 6.4. Pt was given PRBC x1.   -6/19 - Heme/Onc was consulted    Today, pt tells us that he has had an 80 lb weight loss over the past year. He has also had diffuse weakness x1 month. He denies melena/hematochezia/hematemesis. He does feel that his weakness is better after blood transfusion, but he is unsure if he will be able to walk as he has not tried yet.     Review of Systems  -denies melena    Past Medical History  -see HPI  -denies MI/CVA  -denies childhood illness/chronic illnesses/congenital illnesses    Past Surgical History  -see HPI    Medications     Prior to Admission Medications   Prescriptions Last Dose Informant Patient Reported? Taking?   atenolol (TENORMIN) 25 MG Tab 6/18/2024 at 0730 Family Member Yes Yes   Sig: Take 25 mg by mouth every morning.   atorvastatin (LIPITOR) 80 MG tablet 6/17/2024 at 1930 Family Member Yes Yes   Sig: Take 80 mg by mouth every evening.   cycloSPORINE (RESTASIS) 0.05 % ophthalmic emulsion 6/18/2024 at 0800 Family Member No Yes   Sig: Administer 1 Drop into both eyes 2 times a day.   docusate calcium (SURFAK) 240 MG Cap 6/17/2024 at 1930 Family Member Yes Yes   Sig: Take 240 mg by mouth at bedtime.   isosorbide mononitrate SR (IMDUR) 30 MG TABLET SR 24 HR 6/18/2024 at 0730 Family Member Yes Yes   Sig: Take 15 mg by mouth every morning. .5 tab = 15 mg   levothyroxine (SYNTHROID) 125 MCG Tab 6/18/2024 at  0500 Family Member Yes Yes   Sig: Take 125 mcg by mouth every morning on an empty stomach.   oxybutynin SR (DITROPAN-XL) 5 MG TABLET SR 24 HR 6/18/2024 at 0730 Family Member Yes Yes   Sig: Take 5 mg by mouth every day.   potassium chloride SA (KDUR) 20 MEQ Tab CR 6/18/2024 at 0730 Family Member Yes Yes   Sig: Take 20 mEq by mouth 2 times a day.   tamsulosin (FLOMAX) 0.4 MG capsule 6/18/2024 at 0730 Family Member Yes Yes   Sig: Take 0.4 mg by mouth every morning.      Facility-Administered Medications: None        Allergies    No Known Allergies    Family History    family history includes Cancer in his mother.     Social History  -see HPI   Tobacco: see HPI   Alcohol: denies   Recreational drugs (illegal and prescription):  denies       Physical Exam    Vitals:  Temp:  [36.1 °C (97 °F)-36.8 °C (98.2 °F)] 36.4 °C (97.5 °F)  Pulse:  [54-86] 60  Resp:  [14-25] 19  BP: ()/(40-64) 98/48  SpO2:  [90 %-100 %] 100 %    -General: NAD, converses well  -Head: no skull deformities, no scalp lacerations   -Eyes: EOMI, no mydriasis or miosis  -ENT: good oral hygeine, no nasal septal deviation  -Cardio: no murmurs or gallops  -Resp: lungs CTAB, symmetric expansion  -Abd: soft and nontender, not distended, no guarding or rebound  -Neuro: no FND, JIMÉNEZ  -Skin: no rashes, no lacerations noted  -MSK: no gross bone deformities, no hematomas noted    Data:  -WBC = 4.3, ANC = 2660  -Hgb = 6.4 --> 8  -Plt = 71   -Cr = 0.49   -Fe panel = low TIBC  -proBNP = 1265   -FT4 = wnl, TSH = low    Assessment/Plan  79M Retired  with a h/o Myelodysplastic Syndrome (dx in 2023 by bone biopsy, and ?tx with chemo in the past), CAD (s/p stents in (~2020), Chronic Respiratory Failure (on 2L NC), BPH and OAB, RA, HTN, h/o smoking (1.5 ppd x15 years, quit in ~2014).    He presented with anemia.    Hospital Course:  -6/18 - Pt was advised to the go the ED at the advice of his Oncologist, and he was ultimately admitted with weakness and  "SOB which was attributed to pt's anemia with Hgb = 6.4. Pt was given PRBC x1.   -6/19 - Heme/Onc was consulted    #Chronic Anemia (Hgb = 6.4 on presentation)  (Likely secondary to pt's MDS. There was not an acute drop and pt denies any s/s of GI bleed.)    #Myelodysplastic Syndrome  #CAD  #Chronic Hypoxic Respiratory Failure (etiology uncertain)  #RA    Recommendations:  -pt and his wife are unsure who his oncologist is. [facility] name \"Dr. Arnie Anne,\" but after calling her office, we were told that she was only a [facility] physician. We asked to speak with an oncologist at the [facility], but because it is a federal holiday, there was not an oncologist on call. We will need to call the [facility] on 6/20/24 to get more information about pt's previously reported history of Myelodysplastic Syndrome.  -pt's wife is asking for an update. I will defer this conversation until we speak with pt's Oncologist   -supportive care of anemia per primary team         -Code Status = Full     Jaime oSod, PGY3  Internal Medicine Residency with [employer]     Plan has been discussed with Attending Physician.      "

## 2024-06-19 NOTE — DOCUMENTATION QUERY
[facility]                                                                       Query Response Note      PATIENT:               DOUGLAS KHAN  ACCT #:                  [id number]  MRN:                     [id number]  :                      1945  ADMIT DATE:       2024 1:18 PM  DISCH DATE:          RESPONDING  PROVIDER #:        [id number]           QUERY TEXT:    BMI <19 is documented in the Medical Record.      Can a diagnosis be provided to support this finding?    *Note: If you agree with a diagnosis listed above, please remember to include it in your concurrent daily documentation and onto the Discharge Summary    The patient's Clinical Indicators include:  Findings:  --Pt with MDS admitted for symptomatic anemia and pancytopenia  --Per ER provider notes , ''Frail-appearing elderly male'' documented  --Per H&P, ''''79 year old frail male'' documented  --BMI:  17.33, weight:  51.7kg, height:  1.727m per Epic chart review    Treatment:  --Weight/height/BMI calculations    Risk Factors:  --MDS  --Pancytopenia  --RA  --COPD    Thank you,  Eladia House RN, BSN  Clinical   Connect via [employer]  Options provided:   -- Underweight   -- Cachexia   -- Finding of no clinical significance   -- Other explanation, (please specify other explanation)      Query created by: Eladai House on 2024 7:25 AM    RESPONSE TEXT:    Cachexia          Electronically signed by:  CHEY NEFF MD 2024 11:47 AM

## 2024-06-19 NOTE — ASSESSMENT & PLAN NOTE
[facility].  He has not ever received treatments for MDS.  He does not remember the last time he saw an oncologist if ever.  - [facility]  - Bone marrow biopsy completed 6/21  - Repeat CBC in a.m.

## 2024-06-19 NOTE — HOSPITAL COURSE
King Alegria is a 79-year-old male with PMHx CAD, COPD with chronic hypoxia, continuous supplemental O2 2 L/min, RA, HTN, HLD, MDS/aplastic anemia [facility] not [facility].  Admitted 6/18 for worsening shortness of breath.    Hemoglobin on admission was 6.4.  Patient received 1 unit RBCs in the emergency room.    Case was discussed with Dr. Malik - Mariluz Larson.  Recommending bone marrow biopsy during this hospital admission.  Bone marrow biopsy was completed on 6/21.  Patient did not require any additional red blood cell transfusions.    Additionally during this hospitalization-patient's systolic blood pressure ranged in the 90s to low 100s.  I would recommend discontinuing atenolol and Imdur at this time.  Patient has had significant weight loss over the last few months and this is likely affected his antihypertensive needs.  He has been feeling lightheaded at home.  Low blood pressure could also be contributing.    PT OT consulted prior to discharge.  Recommending home health.  This has been arranged.  Patient will discharge home to his wife.  He will follow-up with [facility] for bone marrow biopsy results and possible treatment planning.

## 2024-06-19 NOTE — ASSESSMENT & PLAN NOTE
Baseline supplemental oxygen requirement 2 L/min  - Does not currently appear to be in an exacerbation  - Currently at baseline  - Continue DuoNebs as needed for shortness of breath or wheezing

## 2024-06-19 NOTE — PROGRESS NOTES
"Assumed care of patient at bedside report from ED RN. Updated on plan of care.   Patient currently A & O x 4; on 2 L nasal cannula. Able to turn self in bed; without complaints of acute pain. Assessment completed. Patient's last bowel movement 06/18 .   Call light within reach. Hourly rounding and fall precautions in place. Bed locked and in lowest position. All questions answered. No other needs indicated at this time.    Most recent vital signs  /64   Pulse 86   Temp 36.1 °C (97 °F) (Temporal)   Resp 18   Ht 1.727 m (5' 8\")   Wt 51.7 kg (114 lb)   SpO2 96%   BMI 17.33 kg/m²       Patient received 1 unit of blood in the ED, no current orders for redraw of labs, no code status for patient.     RN messaged MD to clarify orders.   "

## 2024-06-19 NOTE — ASSESSMENT & PLAN NOTE
Based on chart review from December 2023  - TSH 6/20: 0.161  - Decrease levothyroxine to 100 mcg  - Will need repeat TSH in 6 weeks

## 2024-06-19 NOTE — PROGRESS NOTES
During morning medication administration patient shared that he had brought medications from home. RN to send home medications to pharmacy. Patient also expressed concern about transportation home at the time of discharge. RN to communicate to day shift.

## 2024-06-19 NOTE — PROGRESS NOTES
[facility] Medicine Daily Progress Note    Date of Service  6/19/2024    Chief Complaint  King Alegria is a 79 y.o. male admitted 6/18/2024 with weakness.    Hospital Course  King Alegria is a 79-year-old male with PMHx CAD, COPD with chronic hypoxia, continuous supplemental O2 2 L/min, RA, HTN, HLD, MDS/aplastic anemia [facility].  Admitted 6/18 for worsening shortness of breath.    Hemoglobin on admission was 6.4.  Patient received 1 unit RBCs in the emergency room.    Interval Problem Update  6/9: Vital stable overnight.  Patient remains on 2 LPM's by nasal cannula; this is his baseline.  Hb 8 from 6.4 on admission.  Platelets 71 from 61.  WBC 4.3 from 3.4.  Creatinine 0.49.  Discussed with Dr. Malik, [facility].  Bone marrow biopsy pending.    I have discussed this patient's plan of care and discharge plan at IDT rounds today with Case Management, Nursing, Nursing leadership, and other members of the IDT team.    Consultants/Specialty  oncology    Code Status  Prior    Disposition  The patient is not medically cleared for discharge to home or a post-acute facility.      I have placed the appropriate orders for post-discharge needs.    Review of Systems  Review of Systems   Constitutional:  Positive for malaise/fatigue. Negative for fever.   Respiratory:  Positive for cough and shortness of breath.    Cardiovascular:  Negative for chest pain and leg swelling.   Gastrointestinal:  Negative for abdominal pain, nausea and vomiting.        Physical Exam  Temp:  [36.1 °C (97 °F)-36.8 °C (98.2 °F)] 36.4 °C (97.5 °F)  Pulse:  [54-86] 60  Resp:  [18-22] 19  BP: ()/(40-64) 98/48  SpO2:  [90 %-100 %] 100 %    Physical Exam  Vitals and nursing note reviewed.   Constitutional:       General: He is not in acute distress.     Appearance: He is ill-appearing.      Comments: Frail-appearing; bilateral temporal wasting; cachectic   HENT:      Head: Normocephalic.      Mouth/Throat:      Mouth:  Mucous membranes are moist.      Pharynx: Oropharynx is clear.   Cardiovascular:      Rate and Rhythm: Normal rate and regular rhythm.      Heart sounds: Murmur heard.   Pulmonary:      Effort: Pulmonary effort is normal. No respiratory distress.      Breath sounds: Normal breath sounds. No wheezing.   Abdominal:      General: Abdomen is flat. Bowel sounds are normal. There is no distension.      Palpations: Abdomen is soft.      Tenderness: There is no abdominal tenderness.   Musculoskeletal:         General: No swelling. Normal range of motion.      Cervical back: Normal range of motion. No tenderness.   Skin:     General: Skin is warm and dry.      Coloration: Skin is pale.      Findings: Bruising present.   Neurological:      Mental Status: He is alert and oriented to person, place, and time.   Psychiatric:         Mood and Affect: Mood normal.         Behavior: Behavior normal.         Fluids    Intake/Output Summary (Last 24 hours) at 6/19/2024 1535  Last data filed at 6/19/2024 0900  Gross per 24 hour   Intake 1080 ml   Output 400 ml   Net 680 ml       Laboratory  Recent Labs     06/18/24  1354 06/19/24  0850   WBC 3.4* 4.3*   RBC 1.60* 2.16*   HEMOGLOBIN 6.4* 8.0*   HEMATOCRIT 19.8* 24.2*   .8* 112.0*   MCH 40.0* 37.0*   MCHC 32.3 33.1   RDW 77.6* 99.6*   PLATELETCT 61* 71*   MPV 10.4 9.6     Recent Labs     06/18/24  1354 06/19/24  0324   SODIUM 136 134*   POTASSIUM 4.6 4.5   CHLORIDE 102 102   CO2 21 23   GLUCOSE 95 86   BUN 14 14   CREATININE 0.60 0.49*   CALCIUM 9.3 9.5                   Imaging  DX-CHEST-PORTABLE (1 VIEW)   Final Result      1. No acute findings.   2. Other stable chronic degenerative and posttraumatic changes.           Assessment/Plan  * Symptomatic anemia- (present on admission)  Assessment & Plan  Secondary to MDS or possibly new malignancy  - Discussed with Dr. Malik; [facility]  - Bone marrow biopsy ordered  - S/p 1 unit RBCs 6/18  - Repeat CBC in a.m.  - Transfuse  for Hb less than 7    Acquired hypothyroidism  Assessment & Plan  Based on chart review from December 2023  - TSH in a.m.  - Continue levothyroxine    Severe protein-calorie malnutrition (HCC)  Assessment & Plan  Decreased appetite; less than 75% of energy requirement intake; severe loss of muscle mass; severe loss of body fat  -Nutrition following  - Continue supplements  - Monitoring for refeeding syndrome    MDS (myelodysplastic syndrome) ([facility])  Assessment & Plan  [facility].  He has not ever received treatments for MDS.  He does not remember the last time he saw an oncologist if ever.  - Discussed with Dr. Malik [facility] oncology  - Bone marrow biopsy pending  - Repeat CBC in a.m.    COPD (chronic obstructive pulmonary disease) ([facility])- (present on admission)  Assessment & Plan  Baseline supplemental oxygen requirement 2 L/min  - Does not currently appear to be in an exacerbation  - Currently at baseline  - Continue DuoNebs as needed for shortness of breath or wheezing         VTE prophylaxis:   SCDs/TEDs   pharmacologic prophylaxis contraindicated due to Acute anemia      I have performed a physical exam and reviewed and updated ROS and Plan today (6/19/2024). In review of yesterday's note (6/18/2024), there are no changes except as documented above.

## 2024-06-19 NOTE — DISCHARGE PLANNING
Care Transition Team Assessment  Patient is a 79 year old male admitted for symptomatic anemia. Please see H&P for prior medical history. MARIANN CM met with patient at bedside to complete assessment. Pt A&O X 4 and able to verify information on the face sheet. Patient lives with his spouse, Cristina in a single story home with 0 marissa. Physical address is [address] [place] [id number] Confirmed that Cristina is his emergency contact (p) [phone]. Prior to admission patient was independent with ADL's and IADL's. He does use a front wheel walker and oxygen [facility].   Patient reports his spouse is good support. Patient is retired and receives SSI. [facility]. [facility]. Patient has used home health in the past for home infusions. He denies any IPR/SNF. Confirmed patients insurance coverage is Medicare, as well as [facility] benefits. Patient will need transportation arranged home once medically cleared for discharge. Patients goal is to return home.   [facility].     Information Source  Orientation Level: Oriented X4  Information Given By: Patient  Who is responsible for making decisions for patient? : Patient    Readmission Evaluation  Is this a readmission?: No    Elopement Risk  Legal Hold: No  Ambulatory or Self Mobile in Wheelchair: Yes  Disoriented: No  Psychiatric Symptoms: None  History of Wandering: No  Elopement this Admit: No  Vocalizing Wanting to Leave: No  Displays Behaviors, Body Language Wanting to Leave: No-Not at Risk for Elopement  Elopement Risk: Not at Risk for Elopement    Interdisciplinary Discharge Planning  Lives with - Patient's Self Care Capacity: Spouse  Patient or legal guardian wants to designate a caregiver: No  Support Systems: Family Member(s), Spouse / Significant Other  Housing / Facility: 1 Story House    Discharge Preparedness  What is your plan after discharge?: Home with help  What are your discharge  supports?: Spouse  Prior Functional Level: Independent with Activities of Daily Living, Independent with Medication Management  Difficulity with ADLs: Walking  Difficulty with ADLs Comment: uses front wheel walker  Difficulity with IADLs: None    Functional Assesment  Prior Functional Level: Independent with Activities of Daily Living, Independent with Medication Management    Finances  Financial Barriers to Discharge: No  Prescription Coverage: Yes    Vision / Hearing Impairment  Vision Impairment : Yes  Right Eye Vision: Wears Glasses, Impaired  Left Eye Vision: Wears Glasses, Impaired  Hearing Impairment : Yes  Hearing Impairment: Patient Declines to Wear Hearing Device(s), Hearing Device Not Available  Does Pt Need Special Equipment for the Hearing Impaired?: No      Advance Directive  Advance Directive?: None  Advance Directive offered?: AD Booklet refused    Domestic Abuse  Have you ever been the victim of abuse or violence?: No  Possible Abuse/Neglect Reported to:: Not Applicable    Psychological Assessment  History of Substance Abuse: None  History of Psychiatric Problems: No  Non-compliant with Treatment: No  Newly Diagnosed Illness: No    Discharge Risks or Barriers  Discharge risks or barriers?: Transportation, Complex medical needs    Anticipated Discharge Information  Discharge Disposition: Discharged to home/self care (01)

## 2024-06-19 NOTE — PROGRESS NOTES
4 Eyes Skin Assessment Completed by JUSTIN Gaston and JUSTIN britt.    Head WDL  Ears WDL  Nose WDL  Mouth WDL  Neck WDL  Breast/Chest WDL  Shoulder Blades WDL  Spine WDL  (R) Arm/Elbow/Hand WDL  (L) Arm/Elbow/Hand WDL  Abdomen WDL  Groin WDL  Scrotum/Coccyx/Buttocks Redness and Blanching  (R) Leg WDL  (L) Leg WDL  (R) Heel/Foot/Toe WDL  (L) Heel/Foot/Toe WDL          Devices In Places Pulse Ox, SCD's, and Nasal Cannula      Interventions In Place Gray Ear Foams, Sacral Mepilex, Pillows, Q2 Turns, and Pressure Redistribution Mattress    Possible Skin Injury No    Pictures Uploaded Into Epic Yes  Wound Consult Placed N/A  RN Wound Prevention Protocol Ordered Yes

## 2024-06-20 PROBLEM — I95.9 HYPOTENSION: Status: ACTIVE | Noted: 2024-06-20

## 2024-06-20 LAB
ALBUMIN SERPL BCP-MCNC: 3.6 G/DL (ref 3.2–4.9)
ALBUMIN/GLOB SERPL: 1 G/DL
ALP SERPL-CCNC: 78 U/L (ref 30–99)
ALT SERPL-CCNC: 26 U/L (ref 2–50)
ANION GAP SERPL CALC-SCNC: 12 MMOL/L (ref 7–16)
AST SERPL-CCNC: 22 U/L (ref 12–45)
BILIRUB SERPL-MCNC: 0.4 MG/DL (ref 0.1–1.5)
BUN SERPL-MCNC: 17 MG/DL (ref 8–22)
CALCIUM ALBUM COR SERPL-MCNC: 9.6 MG/DL (ref 8.5–10.5)
CALCIUM SERPL-MCNC: 9.3 MG/DL (ref 8.5–10.5)
CHLORIDE SERPL-SCNC: 101 MMOL/L (ref 96–112)
CO2 SERPL-SCNC: 22 MMOL/L (ref 20–33)
CREAT SERPL-MCNC: 0.68 MG/DL (ref 0.5–1.4)
ERYTHROCYTE [DISTWIDTH] IN BLOOD BY AUTOMATED COUNT: 94.9 FL (ref 35.9–50)
GFR SERPLBLD CREATININE-BSD FMLA CKD-EPI: 96 ML/MIN/1.73 M 2
GLOBULIN SER CALC-MCNC: 3.6 G/DL (ref 1.9–3.5)
GLUCOSE SERPL-MCNC: 92 MG/DL (ref 65–99)
HCT VFR BLD AUTO: 22.5 % (ref 42–52)
HCT VFR BLD AUTO: 22.5 % (ref 42–52)
HGB BLD-MCNC: 7.2 G/DL (ref 14–18)
HGB BLD-MCNC: 7.5 G/DL (ref 14–18)
MAGNESIUM SERPL-MCNC: 2 MG/DL (ref 1.5–2.5)
MCH RBC QN AUTO: 35.6 PG (ref 27–33)
MCHC RBC AUTO-ENTMCNC: 32 G/DL (ref 32.3–36.5)
MCV RBC AUTO: 111.4 FL (ref 81.4–97.8)
PHOSPHATE SERPL-MCNC: 3.4 MG/DL (ref 2.5–4.5)
PLATELET # BLD AUTO: 68 K/UL (ref 164–446)
PLATELETS.RETICULATED NFR BLD AUTO: 2.6 % (ref 0.6–13.1)
PMV BLD AUTO: 10.7 FL (ref 9–12.9)
POTASSIUM SERPL-SCNC: 4.4 MMOL/L (ref 3.6–5.5)
PROT SERPL-MCNC: 7.2 G/DL (ref 6–8.2)
RBC # BLD AUTO: 2.02 M/UL (ref 4.7–6.1)
SODIUM SERPL-SCNC: 135 MMOL/L (ref 135–145)
T4 FREE SERPL-MCNC: 1.45 NG/DL (ref 0.93–1.7)
TSH SERPL DL<=0.005 MIU/L-ACNC: 0.16 UIU/ML (ref 0.38–5.33)
WBC # BLD AUTO: 3.9 K/UL (ref 4.8–10.8)

## 2024-06-20 PROCEDURE — 80053 COMPREHEN METABOLIC PANEL: CPT

## 2024-06-20 PROCEDURE — 84100 ASSAY OF PHOSPHORUS: CPT

## 2024-06-20 PROCEDURE — 83735 ASSAY OF MAGNESIUM: CPT

## 2024-06-20 PROCEDURE — 84439 ASSAY OF FREE THYROXINE: CPT

## 2024-06-20 PROCEDURE — 36415 COLL VENOUS BLD VENIPUNCTURE: CPT

## 2024-06-20 PROCEDURE — 770004 HCHG ROOM/CARE - ONCOLOGY PRIVATE *

## 2024-06-20 PROCEDURE — 85027 COMPLETE CBC AUTOMATED: CPT

## 2024-06-20 PROCEDURE — 84443 ASSAY THYROID STIM HORMONE: CPT

## 2024-06-20 PROCEDURE — 85014 HEMATOCRIT: CPT

## 2024-06-20 PROCEDURE — 99231 SBSQ HOSP IP/OBS SF/LOW 25: CPT | Mod: GC | Performed by: STUDENT IN AN ORGANIZED HEALTH CARE EDUCATION/TRAINING PROGRAM

## 2024-06-20 PROCEDURE — 94664 DEMO&/EVAL PT USE INHALER: CPT

## 2024-06-20 PROCEDURE — 99233 SBSQ HOSP IP/OBS HIGH 50: CPT | Performed by: STUDENT IN AN ORGANIZED HEALTH CARE EDUCATION/TRAINING PROGRAM

## 2024-06-20 PROCEDURE — 700102 HCHG RX REV CODE 250 W/ 637 OVERRIDE(OP): Performed by: INTERNAL MEDICINE

## 2024-06-20 PROCEDURE — 85055 RETICULATED PLATELET ASSAY: CPT

## 2024-06-20 PROCEDURE — A9270 NON-COVERED ITEM OR SERVICE: HCPCS | Performed by: INTERNAL MEDICINE

## 2024-06-20 PROCEDURE — 85018 HEMOGLOBIN: CPT

## 2024-06-20 RX ORDER — LEVOTHYROXINE SODIUM 0.1 MG/1
100 TABLET ORAL
Status: DISCONTINUED | OUTPATIENT
Start: 2024-06-21 | End: 2024-06-21 | Stop reason: HOSPADM

## 2024-06-20 RX ADMIN — POTASSIUM CHLORIDE 20 MEQ: 1500 TABLET, EXTENDED RELEASE ORAL at 17:10

## 2024-06-20 RX ADMIN — OXYBUTYNIN CHLORIDE 5 MG: 5 TABLET, EXTENDED RELEASE ORAL at 05:26

## 2024-06-20 RX ADMIN — Medication 100 MG: at 05:27

## 2024-06-20 RX ADMIN — POTASSIUM CHLORIDE 20 MEQ: 1500 TABLET, EXTENDED RELEASE ORAL at 05:27

## 2024-06-20 RX ADMIN — LEVOTHYROXINE SODIUM 125 MCG: 0.12 TABLET ORAL at 05:27

## 2024-06-20 RX ADMIN — ATORVASTATIN CALCIUM 80 MG: 80 TABLET, FILM COATED ORAL at 20:28

## 2024-06-20 RX ADMIN — TAMSULOSIN HYDROCHLORIDE 0.4 MG: 0.4 CAPSULE ORAL at 05:27

## 2024-06-20 RX ADMIN — THERA TABS 1 TABLET: TAB at 05:27

## 2024-06-20 ASSESSMENT — PAIN DESCRIPTION - PAIN TYPE
TYPE: ACUTE PAIN
TYPE: ACUTE PAIN

## 2024-06-20 ASSESSMENT — ENCOUNTER SYMPTOMS
COUGH: 1
NAUSEA: 0
SHORTNESS OF BREATH: 0
VOMITING: 0
ABDOMINAL PAIN: 0
FEVER: 0

## 2024-06-20 ASSESSMENT — FIBROSIS 4 INDEX: FIB4 SCORE: 3.62

## 2024-06-20 NOTE — PROGRESS NOTES
Received RN report, assumed care for this patient  Awake, resting with respirations even and unlabored  Assessment done  AO x 4, Denies any pain.   Urinal at bedside  Bed alarm is on, 3 side rails up  2 lpm via nc of oxygen in the morning and room air at night. tolerating well  Low BP of 93/45 (61). Hospitalist made aware. Asymptomatic- Continue monitoring  Clear  and diminished on all lung lobes, active bowel sounds  Call light and personal items within reached  Kept safe and continue monitoring  All needs met at this time     Plan:   Bone Marrow Biopsy- Friday 11 am  Monitor h/h, s/s of bleeding, v/s- low bp  Sleeps more than 5 hours  Free from fall

## 2024-06-20 NOTE — PROGRESS NOTES
[facility] Medicine Daily Progress Note    Date of Service  6/20/2024    Chief Complaint  King Alegria is a 79 y.o. male admitted 6/18/2024 with weakness.    Hospital Course  King Alegria is a 79-year-old male with PMHx CAD, COPD with chronic hypoxia, continuous supplemental O2 2 L/min, RA, HTN, HLD, MDS/aplastic anemia [facility].  Admitted 6/18 for worsening shortness of breath.    Hemoglobin on admission was 6.4.  Patient received 1 unit RBCs in the emergency room.    Interval Problem Update  6/19: Vital stable overnight.  Patient remains on 2 LPM's by nasal cannula; this is his baseline.  Hb 8 from 6.4 on admission.  Platelets 71 from 61.  WBC 4.3 from 3.4.  Creatinine 0.49.  Discussed with Dr. Malik, [facility] oncology.  Bone marrow biopsy pending.    6/20: Vital stable overnight.  Patient weaned to room air.  WBC 3.9.  Hb 7.2.  Platelets 68.  TSH 0.16.  Decrease levothyroxine to 100 mcg.  Bone marrow biopsy is pending, scheduled for tomorrow.  SBP consistently less than 100.  Hold Imdur at this time.    I have discussed this patient's plan of care and discharge plan at IDT rounds today with Case Management, Nursing, Nursing leadership, and other members of the IDT team.    Consultants/Specialty  oncology    Code Status  Prior    Disposition  The patient is not medically cleared for discharge to home or a post-acute facility.      I have placed the appropriate orders for post-discharge needs.    Review of Systems  Review of Systems   Constitutional:  Positive for malaise/fatigue. Negative for fever.   Respiratory:  Positive for cough. Negative for shortness of breath.    Cardiovascular:  Negative for chest pain and leg swelling.   Gastrointestinal:  Negative for abdominal pain, nausea and vomiting.        Physical Exam  Temp:  [36.5 °C (97.7 °F)-36.8 °C (98.3 °F)] 36.5 °C (97.7 °F)  Pulse:  [68-74] 68  Resp:  [16-18] 16  BP: ()/(43-51) 115/51  SpO2:  [92 %-100 %] 92 %    Physical  Exam  Vitals and nursing note reviewed.   Constitutional:       General: He is not in acute distress.     Appearance: He is ill-appearing.      Comments: Frail-appearing; bilateral temporal wasting; cachectic   HENT:      Head: Normocephalic.   Cardiovascular:      Rate and Rhythm: Normal rate and regular rhythm.      Heart sounds: Murmur heard.   Pulmonary:      Effort: Pulmonary effort is normal. No respiratory distress.      Breath sounds: Normal breath sounds. No wheezing.   Musculoskeletal:         General: No swelling. Normal range of motion.   Skin:     General: Skin is warm and dry.      Coloration: Skin is pale.      Findings: Bruising present.   Neurological:      Mental Status: He is alert and oriented to person, place, and time.   Psychiatric:         Mood and Affect: Mood normal.         Behavior: Behavior normal.         Fluids    Intake/Output Summary (Last 24 hours) at 6/20/2024 1035  Last data filed at 6/19/2024 2005  Gross per 24 hour   Intake 240 ml   Output 1350 ml   Net -1110 ml       Laboratory  Recent Labs     06/18/24  1354 06/19/24  0850 06/20/24  0502   WBC 3.4* 4.3* 3.9*   RBC 1.60* 2.16* 2.02*   HEMOGLOBIN 6.4* 8.0* 7.2*   HEMATOCRIT 19.8* 24.2* 22.5*   .8* 112.0* 111.4*   MCH 40.0* 37.0* 35.6*   MCHC 32.3 33.1 32.0*   RDW 77.6* 99.6* 94.9*   PLATELETCT 61* 71* 68*   MPV 10.4 9.6 10.7     Recent Labs     06/18/24  1354 06/19/24  0324 06/20/24  0502   SODIUM 136 134* 135   POTASSIUM 4.6 4.5 4.4   CHLORIDE 102 102 101   CO2 21 23 22   GLUCOSE 95 86 92   BUN 14 14 17   CREATININE 0.60 0.49* 0.68   CALCIUM 9.3 9.5 9.3                   Imaging  DX-CHEST-PORTABLE (1 VIEW)   Final Result      1. No acute findings.   2. Other stable chronic degenerative and posttraumatic changes.           Assessment/Plan  * Symptomatic anemia- (present on admission)  Assessment & Plan  Secondary to MDS or possibly new malignancy  Hb 7.2 from 8  - Discussed with Dr. Malik; [facility]  - Bone marrow  biopsy; scheduled for tomorrow  - S/p 1 unit RBCs 6/18  - Repeat H&H at noon; CBC in a.m.  - Transfuse for Hb less than 7    Hypotension  Assessment & Plan  SBP ranging   Patient has lost significant weight and this is likely changed his antihypertensive requirements  - Hold Imdur  - Continue atenolol  - Close monitoring of blood pressures    Acquired hypothyroidism  Assessment & Plan  Based on chart review from December 2023  - TSH 6/20: 0.161  - Decrease levothyroxine to 100 mcg  - Will need repeat TSH in 6 weeks    Severe protein-calorie malnutrition (HCC)  Assessment & Plan  Decreased appetite; less than 75% of energy requirement intake; severe loss of muscle mass; severe loss of body fat  -Nutrition following  - Continue supplements  - Monitoring for refeeding syndrome    MDS (myelodysplastic syndrome) (HCC)  Assessment & Plan  [facility].  He has not ever received treatments for MDS.  He does not remember the last time he saw an oncologist if ever.  - Discussed with Dr. Malik, [facility] oncology  - Bone marrow biopsy pending; scheduled for tomorrow  - Repeat CBC in a.m.    COPD (chronic obstructive pulmonary disease) (HCC)- (present on admission)  Assessment & Plan  Baseline supplemental oxygen requirement 2 L/min  - Does not currently appear to be in an exacerbation  - Currently at baseline  - Continue DuoNebs as needed for shortness of breath or wheezing         VTE prophylaxis:   SCDs/TEDs   pharmacologic prophylaxis contraindicated due to Anemia      I have performed a physical exam and reviewed and updated ROS and Plan today (6/20/2024). In review of yesterday's note (6/19/2024), there are no changes except as documented above.

## 2024-06-20 NOTE — PROGRESS NOTES
"Daily Progress Note: Heme/Onc    Date of Service: 6/20/2024    ID  79M with a h/o Myelodysplastic Syndrome who presented with anemia.     Subjective  Pt is ambulatory today. He still feels weak but states that it is improved from previous.       Objective    Vitals:   Temp:  [36.4 °C (97.5 °F)-36.8 °C (98.3 °F)] 36.6 °C (97.8 °F)  Pulse:  [60-74] 69  Resp:  [18-19] 18  BP: (92-98)/(43-48) 98/43  SpO2:  [92 %-100 %] 92 %     Physical Exam:  -General: NAD, converses well  -Cardio: no murmurs or gallops  -Resp: lungs CTAB, symmetric expansion  -Abd: soft and nontender, no guarding or rebound    Data:  -WBC = 4.3 --> 3.9, ANC = 2660  -Hgb = 6.4 --> 8 --> 7.2  -Plt = 71 --> 68  -MCV = 111  -Cr = 0.68  -Fe panel = low TIBC  -proBNP = 1265   -FT4 = wnl, TSH = low     Assessment/Plan  79M Retired  with a h/o Myelodysplastic Syndrome (dx in 2023 by bone biopsy, and ?tx with chemo in the past), CAD (s/p stents in (~2020), Chronic Respiratory Failure (on 2L NC), BPH and OAB, RA, HTN, h/o smoking (1.5 ppd x15 years, quit in ~2014).     He presented with anemia.     Hospital Course:  -6/18 - Pt was advised to the go the ED at the advice of his Oncologist, and he was ultimately admitted with weakness and SOB which was attributed to pt's anemia with Hgb = 6.4. Pt was given PRBC x1.   -6/19 - Heme/Onc was consulted     #Chronic Anemia (Hgb = 6.4 on presentation)  (Likely secondary to pt's MDS. There was not an acute drop and pt denies any s/s of GI bleed.)     #Myelodysplastic Syndrome  #CAD  #Chronic Hypoxic Respiratory Failure (etiology uncertain)  #RA     Recommendations:  -pt and his wife are unsure who his oncologist is. [facility], we were given the name \"Dr. Arnie Anne,\" but after calling her office, we were told that she was only a [facility] physician who is on vacation until 6/24/24. We were told that there are currently no MD Oncologists available at the [facility] and both midlevel providers at the [facility] " are on vacation. We will need to call the [facility] on to get more information about pt's previously reported history of Myelodysplastic Syndrome on Monday, or formally request medical records.   -pt's wife is asking for an update. I will defer this conversation until we speak with pt's Oncologist   -supportive care of anemia per primary team   -recommend B9/B12 levels  -recommend checking EPO levels  -planning for bone marrow biopsy 6/20/24           -Code Status = Full     Jaime Sood, PGY3  Internal Medicine Residency with [employer]     Plan has been discussed with Attending Physician.

## 2024-06-20 NOTE — CARE PLAN
The patient is Stable - Low risk of patient condition declining or worsening    Shift Goals  Clinical Goals: Monitor h/h, labs, free from fall, biopsy on Friday  Patient Goals: Rest, sleeps well  Family Goals: not present    Progress made toward(s) clinical / shift goals:    Problem: Knowledge Deficit - Standard  Goal: Patient and family/care givers will demonstrate understanding of plan of care, disease process/condition, diagnostic tests and medications  Outcome: Progressing  Note: Reviewed plan of care, understood and agreed.     Problem: Knowledge Deficit - COPD  Goal: Patient/significant other demonstrates understanding of disease process, utilization of the Action Plan, medications and discharge instruction  Outcome: Progressing  Note: 2 lpm via nc of oxygen in the morning and room air at night baseline.     Problem: Ineffective Airway Clearance  Goal: Patient will maintain patent airway with clear/clearing breath sounds  Outcome: Progressing  Note: Clear all lung lobes     Problem: Risk for Aspiration  Goal: Patient's risk for aspiration will be absent or decrease  Outcome: Progressing     Problem: Fall Risk  Goal: Patient will remain free from falls  Outcome: Progressing  Note: Bed in lowest position, bed alarm in placed, room near nurses station, frequent roundings     Received RN report, assumed care for this patient  Awake, resting with respirations even and unlabored  Assessment done  AO x 4, Denies any pain.   Urinal at bedside  Bed alarm is on, 3 side rails up  2 lpm via nc of oxygen in the morning and room air at night. tolerating well  Low BP of 93/45 (61). Hospitalist made aware. Asymptomatic- Continue monitoring  Clear  and diminished on all lung lobes, active bowel sounds  Call light and personal items within reached  Kept safe and continue monitoring  All needs met at this time     Plan:   Bone Marrow Biopsy- Friday 11 am  Monitor h/h, s/s of bleeding, v/s- low bp  Sleeps more than 5 hours  Free  from fall        Patient is not progressing towards the following goals:

## 2024-06-20 NOTE — CARE PLAN
The patient is Watcher - Medium risk of patient condition declining or worsening    Shift Goals  Clinical Goals: monitor HGB  Patient Goals: rest  Family Goals: update on POC    Progress made toward(s) clinical / shift goals:  Patient is AxO x4 and understands plan of care, all questions answered at this time. Call light and personal belongings are within reach. Pt calls appropriately for nursing needs. Frequent rounding in place. Bed is locked and in lowest position. Labs drawn this morning.     Patient is not progressing towards the following goals: NA

## 2024-06-20 NOTE — RESPIRATORY CARE
COPD EDUCATION by COPD CLINICAL EDUCATOR  6/20/2024  at  3:47 PM by Ksenia Jenkins RRT     Patient interviewed by education team.  Patient declined or is unable to participate in the full program.  Therefore, a short intervention has been conducted.  A comprehensive packet including information about COPD, types of treatments to manage their disease and safe home Oxygen usage was provided and reviewed with patient at the bedside.  Patient given a spacer with instruction, as well as a list of local pulmonary clinics. We discussed the benefits of seeing a pulmonologist and that the [facility] will sometimes refer patients to other pulmonary groups.       COPD Screen  COPD Risk Screening  Do you have a history of COPD?: Yes  Do you have a Pulmonologist?: No  COPD Population Screener  During the past 4 weeks, how much did you feel short of breath?: None/Little of the time  Do you ever cough up any mucus or phlegm?: No/only with occasional colds or infections  In the past 12 months, you do less than you used to because of your breathing problems: Disagree/unsure  Have you smoked at least 100 cigarettes in your entire life?: Yes  How old are you?: 60+  COPD Screening Score: 4  COPD Coordinator Not Recommended: Yes    COPD Assessment  COPD Clinical Specialists ONLY  COPD Education Initiated: Yes--Short Intervention (Given COPD booklet and spacer, [facility] however does not think they have a pulmonologist. Given list of local pulmonary groups and discussed getter referral to see a pulmonologist from the [facility] if desired.)  Is this a COPD exacerbation patient?: No  DME Company: [employer]  DME Equipment Type: O2 @ 3 L  [facility]: [facility]  Pulmonologist Name: given list  Referrals Initiated:  (none indicated at this time, quit smoking in 2013)  $ Demo/Eval of SVN's, MDI's and Aerosols: Yes (spacer)  (OP) Pulmonary Function Testing:  (none on file)  Interdisciplinary Rounds: Attendance at Rounds (30 Min)    PFT Results    No results found for:  "\"PFT\"    Meds to [facility]  [facility] provides bedside medication delivery for all eligible patients at discharge.  Would you like to opt out of this program for any reason?: No - Stay Opted In     MY COPD ACTION PLAN     It is recommended that patients and physicians /healthcare providers complete this action plan together. This plan should be discussed at each physician visit and updated as needed.    The green, yellow and red zones show groups of symptoms of COPD. This list of symptoms is not comprehensive, and you may experience other symptoms. In the \"Actions\" column, your healthcare provider has recommended actions for you to take based on your symptoms.    Patient Name: King Alegria   YOB: 1945   Last Updated on: 12/28/2023 10:47 AM   Green Zone:  I am doing well today Actions     Usual activitiy and exercise level   Take daily medications     Usual amounts of cough and phlegm/mucus   Use oxygen as prescribed     Sleep well at night   Continue regular exercise/diet plan     Appetite is good   At all times avoid cigarette smoke, inhaled irritants     Daily Medications (these medications are taken every day):   Budesonide-Formoterol Fumarate (Symbicort)   Twice daily     Additional Information:  Take this as directed and rinse your mouth----the [facility] may substitute this medication     Yellow Zone:  I am having a bad day or a COPD flare Actions     More breathless than usual   Continue daily medications     I have less energy for my daily activities   Use quick relief inhaler as ordered     Increased or thicker phlegm/mucus   Use oxygen as prescribed     Using quick relief inhaler/nebulizer more often   Get plenty of rest     Swelling of ankles more than usual   Use pursed lip breathing     More coughing than usual   At all times avoid cigarette smoke, inhaled irritants     I feel like I have a \"chest cold\"     Poor sleep and my symptoms woke me up     My appetite is not good     My medicine is not " helping      Call provider immediately if symptoms don’t improve     Continue daily medications, add rescue medications:   Albuterol 2 Puffs         Medications to be used during a flare up, (as Discussed with Provider):           Additional Information:  Take this as directed by your Doctor, take using the spacer    Red Zone:  I need urgent medical care Actions     Severe shortness of breath even at rest   Call 911 or seek medical care immediately     Not able to do any activity because of breathing      Fever or shaking chills      Feeling confused or very drowsy       Chest pains      Coughing up blood

## 2024-06-20 NOTE — ASSESSMENT & PLAN NOTE
SBP ranging   Patient has lost significant weight and this is likely changed his antihypertensive requirements  - Hold Imdur  - Remains hypotensive  - Hold atenolol at this time  - Close monitoring of blood pressures

## 2024-06-21 VITALS
SYSTOLIC BLOOD PRESSURE: 101 MMHG | HEIGHT: 68 IN | OXYGEN SATURATION: 98 % | BODY MASS INDEX: 18.38 KG/M2 | TEMPERATURE: 97.7 F | WEIGHT: 121.25 LBS | HEART RATE: 73 BPM | RESPIRATION RATE: 19 BRPM | DIASTOLIC BLOOD PRESSURE: 55 MMHG

## 2024-06-21 LAB
ANION GAP SERPL CALC-SCNC: 13 MMOL/L (ref 7–16)
BUN SERPL-MCNC: 13 MG/DL (ref 8–22)
CALCIUM SERPL-MCNC: 9.1 MG/DL (ref 8.5–10.5)
CHLORIDE SERPL-SCNC: 104 MMOL/L (ref 96–112)
CO2 SERPL-SCNC: 21 MMOL/L (ref 20–33)
CREAT SERPL-MCNC: 0.62 MG/DL (ref 0.5–1.4)
ERYTHROCYTE [DISTWIDTH] IN BLOOD BY AUTOMATED COUNT: 90 FL (ref 35.9–50)
GFR SERPLBLD CREATININE-BSD FMLA CKD-EPI: 98 ML/MIN/1.73 M 2
GLUCOSE SERPL-MCNC: 90 MG/DL (ref 65–99)
HCT VFR BLD AUTO: 22.7 % (ref 42–52)
HGB BLD-MCNC: 7.6 G/DL (ref 14–18)
HGB RETIC QN AUTO: 41.8 PG/CELL (ref 29–35)
IMM RETICS NFR: 27.7 % (ref 2.6–16.1)
MAGNESIUM SERPL-MCNC: 1.9 MG/DL (ref 1.5–2.5)
MCH RBC QN AUTO: 37.3 PG (ref 27–33)
MCHC RBC AUTO-ENTMCNC: 33.5 G/DL (ref 32.3–36.5)
MCV RBC AUTO: 111.3 FL (ref 81.4–97.8)
PATHOLOGY CONSULT NOTE: NORMAL
PHOSPHATE SERPL-MCNC: 2.8 MG/DL (ref 2.5–4.5)
PLATELET # BLD AUTO: 66 K/UL (ref 164–446)
PLATELETS.RETICULATED NFR BLD AUTO: 2.1 % (ref 0.6–13.1)
PMV BLD AUTO: 10.1 FL (ref 9–12.9)
POTASSIUM SERPL-SCNC: 4.1 MMOL/L (ref 3.6–5.5)
RBC # BLD AUTO: 2.04 M/UL (ref 4.7–6.1)
RETICS # AUTO: 0.06 M/UL (ref 0.04–0.12)
RETICS/RBC NFR: 3 % (ref 0.8–2.6)
SODIUM SERPL-SCNC: 138 MMOL/L (ref 135–145)
WBC # BLD AUTO: 3.9 K/UL (ref 4.8–10.8)

## 2024-06-21 PROCEDURE — A9270 NON-COVERED ITEM OR SERVICE: HCPCS | Performed by: STUDENT IN AN ORGANIZED HEALTH CARE EDUCATION/TRAINING PROGRAM

## 2024-06-21 PROCEDURE — 85027 COMPLETE CBC AUTOMATED: CPT

## 2024-06-21 PROCEDURE — 88313 SPECIAL STAINS GROUP 2: CPT

## 2024-06-21 PROCEDURE — 38222 DX BONE MARROW BX & ASPIR: CPT | Performed by: HOSPITALIST

## 2024-06-21 PROCEDURE — 88360 TUMOR IMMUNOHISTOCHEM/MANUAL: CPT

## 2024-06-21 PROCEDURE — 88341 IMHCHEM/IMCYTCHM EA ADD ANTB: CPT

## 2024-06-21 PROCEDURE — 700102 HCHG RX REV CODE 250 W/ 637 OVERRIDE(OP): Performed by: INTERNAL MEDICINE

## 2024-06-21 PROCEDURE — 07DR3ZX EXTRACTION OF ILIAC BONE MARROW, PERCUTANEOUS APPROACH, DIAGNOSTIC: ICD-10-PCS | Performed by: HOSPITALIST

## 2024-06-21 PROCEDURE — A9270 NON-COVERED ITEM OR SERVICE: HCPCS | Performed by: INTERNAL MEDICINE

## 2024-06-21 PROCEDURE — 85046 RETICYTE/HGB CONCENTRATE: CPT

## 2024-06-21 PROCEDURE — 99231 SBSQ HOSP IP/OBS SF/LOW 25: CPT | Performed by: STUDENT IN AN ORGANIZED HEALTH CARE EDUCATION/TRAINING PROGRAM

## 2024-06-21 PROCEDURE — 700102 HCHG RX REV CODE 250 W/ 637 OVERRIDE(OP): Performed by: STUDENT IN AN ORGANIZED HEALTH CARE EDUCATION/TRAINING PROGRAM

## 2024-06-21 PROCEDURE — 99239 HOSP IP/OBS DSCHRG MGMT >30: CPT | Mod: 25 | Performed by: STUDENT IN AN ORGANIZED HEALTH CARE EDUCATION/TRAINING PROGRAM

## 2024-06-21 PROCEDURE — 88305 TISSUE EXAM BY PATHOLOGIST: CPT | Mod: 59

## 2024-06-21 PROCEDURE — 36415 COLL VENOUS BLD VENIPUNCTURE: CPT

## 2024-06-21 PROCEDURE — 84100 ASSAY OF PHOSPHORUS: CPT

## 2024-06-21 PROCEDURE — 99152 MOD SED SAME PHYS/QHP 5/>YRS: CPT | Performed by: HOSPITALIST

## 2024-06-21 PROCEDURE — 160027 HCHG SURGERY MINUTES - 1ST 30 MINS LEVEL 2: Performed by: HOSPITALIST

## 2024-06-21 PROCEDURE — 88342 IMHCHEM/IMCYTCHM 1ST ANTB: CPT

## 2024-06-21 PROCEDURE — 97162 PT EVAL MOD COMPLEX 30 MIN: CPT

## 2024-06-21 PROCEDURE — 83735 ASSAY OF MAGNESIUM: CPT

## 2024-06-21 PROCEDURE — 80048 BASIC METABOLIC PNL TOTAL CA: CPT

## 2024-06-21 PROCEDURE — 85055 RETICULATED PLATELET ASSAY: CPT

## 2024-06-21 PROCEDURE — 160048 HCHG OR STATISTICAL LEVEL 1-5: Performed by: HOSPITALIST

## 2024-06-21 PROCEDURE — 88311 DECALCIFY TISSUE: CPT

## 2024-06-21 PROCEDURE — 97166 OT EVAL MOD COMPLEX 45 MIN: CPT

## 2024-06-21 PROCEDURE — 700111 HCHG RX REV CODE 636 W/ 250 OVERRIDE (IP): Performed by: HOSPITALIST

## 2024-06-21 RX ORDER — SODIUM CHLORIDE 9 MG/ML
500 INJECTION, SOLUTION INTRAVENOUS
Status: DISCONTINUED | OUTPATIENT
Start: 2024-06-21 | End: 2024-06-21 | Stop reason: HOSPADM

## 2024-06-21 RX ORDER — MIDAZOLAM HYDROCHLORIDE 1 MG/ML
.5-2 INJECTION INTRAMUSCULAR; INTRAVENOUS PRN
Status: DISCONTINUED | OUTPATIENT
Start: 2024-06-21 | End: 2024-06-21 | Stop reason: HOSPADM

## 2024-06-21 RX ORDER — MIDAZOLAM HYDROCHLORIDE 1 MG/ML
INJECTION INTRAMUSCULAR; INTRAVENOUS
Status: DISCONTINUED
Start: 2024-06-21 | End: 2024-06-21 | Stop reason: HOSPADM

## 2024-06-21 RX ADMIN — LEVOTHYROXINE SODIUM 100 MCG: 0.1 TABLET ORAL at 05:32

## 2024-06-21 RX ADMIN — Medication 100 MG: at 05:32

## 2024-06-21 RX ADMIN — OXYBUTYNIN CHLORIDE 5 MG: 5 TABLET, EXTENDED RELEASE ORAL at 05:32

## 2024-06-21 RX ADMIN — POTASSIUM CHLORIDE 20 MEQ: 1500 TABLET, EXTENDED RELEASE ORAL at 05:32

## 2024-06-21 RX ADMIN — THERA TABS 1 TABLET: TAB at 05:32

## 2024-06-21 RX ADMIN — TAMSULOSIN HYDROCHLORIDE 0.4 MG: 0.4 CAPSULE ORAL at 05:32

## 2024-06-21 ASSESSMENT — COGNITIVE AND FUNCTIONAL STATUS - GENERAL
SUGGESTED CMS G CODE MODIFIER MOBILITY: CI
CLIMB 3 TO 5 STEPS WITH RAILING: A LITTLE
MOBILITY SCORE: 23
SUGGESTED CMS G CODE MODIFIER DAILY ACTIVITY: CJ
DRESSING REGULAR LOWER BODY CLOTHING: A LITTLE
DAILY ACTIVITIY SCORE: 22
HELP NEEDED FOR BATHING: A LITTLE

## 2024-06-21 ASSESSMENT — PAIN DESCRIPTION - PAIN TYPE
TYPE: SURGICAL PAIN

## 2024-06-21 ASSESSMENT — ACTIVITIES OF DAILY LIVING (ADL): TOILETING: INDEPENDENT

## 2024-06-21 ASSESSMENT — PATIENT HEALTH QUESTIONNAIRE - PHQ9
1. LITTLE INTEREST OR PLEASURE IN DOING THINGS: NOT AT ALL
2. FEELING DOWN, DEPRESSED, IRRITABLE, OR HOPELESS: NOT AT ALL
SUM OF ALL RESPONSES TO PHQ9 QUESTIONS 1 AND 2: 0

## 2024-06-21 ASSESSMENT — ENCOUNTER SYMPTOMS
SHORTNESS OF BREATH: 0
COUGH: 1
FEVER: 0
ABDOMINAL PAIN: 0
NAUSEA: 0
VOMITING: 0

## 2024-06-21 ASSESSMENT — GAIT ASSESSMENTS
DISTANCE (FEET): 200
GAIT LEVEL OF ASSIST: SUPERVISED

## 2024-06-21 NOTE — DISCHARGE PLANNING
Received choice form @: 1224  Agency/Facility name: [facility]  Sent referral per choice form @: No referral sent.  Pending orders.

## 2024-06-21 NOTE — THERAPY
Physical Therapy Contact Note    Patient Name: King Alegria  Age:  79 y.o., Sex:  male  Medical Record #: [id number]  Today's Date: 6/21/2024    off unit in AM for biopsy will follow in PM as able/appropriate for PT evaluation;     Lynda MOORE, PT,  [phone]

## 2024-06-21 NOTE — PROGRESS NOTES
[facility] Medicine Daily Progress Note    Date of Service  6/21/2024    Chief Complaint  King Alegria is a 79 y.o. male admitted 6/18/2024 with weakness.    Hospital Course  King Alegria is a 79-year-old male with PMHx CAD, COPD with chronic hypoxia, continuous supplemental O2 2 L/min, RA, HTN, HLD, MDS/aplastic anemia [facility].  Admitted 6/18 for worsening shortness of breath.    Hemoglobin on admission was 6.4.  Patient received 1 unit RBCs in the emergency room.    Interval Problem Update  6/19: Vital stable overnight.  Patient remains on 2 LPM's by nasal cannula; this is his baseline.  Hb 8 from 6.4 on admission.  Platelets 71 from 61.  WBC 4.3 from 3.4.  Creatinine 0.49.  Discussed with Dr. Malik, [facility] oncology.  Bone marrow biopsy pending.    6/20: Vital stable overnight.  Patient weaned to room air.  WBC 3.9.  Hb 7.2.  Platelets 68.  TSH 0.16.  Decrease levothyroxine to 100 mcg.  Bone marrow biopsy is pending, scheduled for tomorrow.  SBP consistently less than 100.  Hold Imdur at this time.    6/21: Vitals notable for SBP ranging .  Patient resting comfortably on room air.  WBC stable at 3.9.  Hb stable at 7.6.  Platelets stable at 66.  Bone marrow biopsy completed today.  PT OT consults pending.    I have discussed this patient's plan of care and discharge plan at IDT rounds today with Case Management, Nursing, Nursing leadership, and other members of the IDT team.    Consultants/Specialty  oncology    Code Status  Prior    Disposition  The patient is not medically cleared for discharge to home or a post-acute facility.  Anticipate discharge to: pending PT OT recommendations    I have placed the appropriate orders for post-discharge needs.    Review of Systems  Review of Systems   Constitutional:  Positive for malaise/fatigue. Negative for fever.   Respiratory:  Positive for cough. Negative for shortness of breath.    Cardiovascular:  Negative for chest pain and leg  swelling.   Gastrointestinal:  Negative for abdominal pain, nausea and vomiting.        Physical Exam  Temp:  [36.5 °C (97.7 °F)-37.1 °C (98.8 °F)] 36.5 °C (97.7 °F)  Pulse:  [70-86] 73  Resp:  [16-31] 19  BP: ()/(47-58) 101/55  SpO2:  [92 %-100 %] 98 %    Physical Exam  Vitals and nursing note reviewed.   Constitutional:       General: He is not in acute distress.     Appearance: He is ill-appearing.      Comments: Frail-appearing; bilateral temporal wasting; cachectic   HENT:      Head: Normocephalic.   Cardiovascular:      Rate and Rhythm: Normal rate and regular rhythm.      Heart sounds: Murmur heard.   Pulmonary:      Effort: Pulmonary effort is normal. No respiratory distress.      Breath sounds: Normal breath sounds. No wheezing.   Musculoskeletal:         General: No swelling. Normal range of motion.   Skin:     General: Skin is warm and dry.      Coloration: Skin is pale.      Findings: Bruising present.   Neurological:      Mental Status: He is alert and oriented to person, place, and time.   Psychiatric:         Mood and Affect: Mood normal.         Behavior: Behavior normal.         Fluids    Intake/Output Summary (Last 24 hours) at 6/21/2024 1321  Last data filed at 6/21/2024 0452  Gross per 24 hour   Intake 840 ml   Output 1400 ml   Net -560 ml       Laboratory  Recent Labs     06/19/24  0850 06/20/24  0502 06/20/24  1230 06/21/24  0205   WBC 4.3* 3.9*  --  3.9*   RBC 2.16* 2.02*  --  2.04*   HEMOGLOBIN 8.0* 7.2* 7.5* 7.6*   HEMATOCRIT 24.2* 22.5* 22.5* 22.7*   .0* 111.4*  --  111.3*   MCH 37.0* 35.6*  --  37.3*   MCHC 33.1 32.0*  --  33.5   RDW 99.6* 94.9*  --  90.0*   PLATELETCT 71* 68*  --  66*   MPV 9.6 10.7  --  10.1     Recent Labs     06/19/24  0324 06/20/24  0502 06/21/24  0205   SODIUM 134* 135 138   POTASSIUM 4.5 4.4 4.1   CHLORIDE 102 101 104   CO2 23 22 21   GLUCOSE 86 92 90   BUN 14 17 13   CREATININE 0.49* 0.68 0.62   CALCIUM 9.5 9.3 9.1                    Imaging  DX-CHEST-PORTABLE (1 VIEW)   Final Result      1. No acute findings.   2. Other stable chronic degenerative and posttraumatic changes.           Assessment/Plan  * Symptomatic anemia- (present on admission)  Assessment & Plan  Secondary to MDS or possibly new malignancy  Hb stable at 7.6  - Discussed with Dr. Malik; [facility]  - Bone marrow biopsy 6/21  - Follow pathology results  - S/p 1 unit RBCs 6/18  - Repeat CBC in a.m.  - Transfuse for Hb less than 7    Hypotension  Assessment & Plan  SBP ranging   Patient has lost significant weight and this is likely changed his antihypertensive requirements  - Hold Imdur  - Remains hypotensive  - Hold atenolol at this time  - Close monitoring of blood pressures    Acquired hypothyroidism  Assessment & Plan  Based on chart review from December 2023  - TSH 6/20: 0.161  - Decrease levothyroxine to 100 mcg  - Will need repeat TSH in 6 weeks    Severe protein-calorie malnutrition (HCC)  Assessment & Plan  Decreased appetite; less than 75% of energy requirement intake; severe loss of muscle mass; severe loss of body fat  -Nutrition following  - Continue supplements  - Monitoring for refeeding syndrome    MDS (myelodysplastic syndrome) (HCC)  Assessment & Plan  [facility].  He has not ever received treatments for MDS.  He does not remember the last time he saw an oncologist if ever.  - [facility]ed with [facility]  - Bone marrow biopsy completed 6/21  - Repeat CBC in a.m.    COPD (chronic obstructive pulmonary disease) (HCC)- (present on admission)  Assessment & Plan  Baseline supplemental oxygen requirement 2 L/min  - Does not currently appear to be in an exacerbation  - Currently at baseline  - Continue DuoNebs as needed for shortness of breath or wheezing         VTE prophylaxis:   SCDs/TEDs   pharmacologic prophylaxis contraindicated due to procedure      I have performed a physical exam and reviewed  and updated ROS and Plan today (6/21/2024). In review of yesterday's note (6/20/2024), there are no changes except as documented above.

## 2024-06-21 NOTE — FACE TO FACE
Face to Face Supporting Documentation - Home Health    The encounter with this patient was in whole or in part the primary reason for home health admission.    Date of encounter:   Patient:                    MRN:                       YOB: 2022  King Alegria  [id number]  1945     Home health to see patient for:  Skilled Nursing care for assessment, interventions & education, Medical social work consult, Home health aide, Physical Therapy evaluation and treatment, and Occupational therapy evaluation and treatment    Skilled need for:  Exacerbation of Chronic Disease State myeloid dysplastic syndrome    Skilled nursing interventions to include:  Comment: Per home health recommendations    Homebound status evidenced by:  Need the aid of supportive devices such as crutches, canes, wheelchairs or walkers or Needs the assistance of another person in order to leave the home. Leaving home requires a considerable and taxing effort. There is a normal inability to leave the home.    Community Physician to provide follow up care: Pcp Not In Computer     Optional Interventions? Yes, Details: Per home health recommendations      I certify the face to face encounter for this home health care referral meets the CMS requirements and the encounter/clinical assessment with the patient was, in whole, or in part, for the medical condition(s) listed above, which is the primary reason for home health care. Based on my clinical findings: the service(s) are medically necessary, support the need for home health care, and the homebound criteria are met.  I certify that this patient has had a face to face encounter by myself.  Bea Roberson M.D. - NPI: [id number]

## 2024-06-21 NOTE — PROGRESS NOTES
"Daily Progress Note: Heme/Onc    Date of Service: 6/21/2024    ID  79M with a h/o Myelodysplastic Syndrome who presented with anemia.     Subjective  Pt is ambulatory but continues to feel weak.    He got a bone marrow biopsy this AM.     Objective    Vitals:   Temp:  [36.5 °C (97.7 °F)-37.1 °C (98.8 °F)] 36.5 °C (97.7 °F)  Pulse:  [70-86] 73  Resp:  [16-31] 19  BP: ()/(47-58) 101/55  SpO2:  [92 %-100 %] 98 %     Physical Exam:  -General: NAD, converses well  -Cardio: no murmurs or gallops  -Resp: lungs CTAB, symmetric expansion  -Abd: soft and nontender, no guarding or rebound    Data:  -WBC = 4.3 --> 3.9, ANC = 2660  -Hgb = 6.4 --> 8 --> 7.2  -Plt = 71 --> 68  -MCV = 111  -Cr = 0.68  -Fe panel = low TIBC  -proBNP = 1265   -FT4 = wnl, TSH = low     Assessment/Plan  79M Retired  with a h/o Myelodysplastic Syndrome (dx in 2023 by bone biopsy, and ?tx with chemo in the past), CAD (s/p stents in (~2020), Chronic Respiratory Failure (on 2L NC), BPH and OAB, RA, HTN, h/o smoking (1.5 ppd x15 years, quit in ~2014).     He presented with anemia.     Hospital Course:  -6/18 - Pt was advised to the go the ED at the advice of his Oncologist, and he was ultimately admitted with weakness and SOB which was attributed to pt's anemia with Hgb = 6.4. Pt was given PRBC x1.   -6/19 - Heme/Onc was consulted     #Chronic Anemia (Hgb = 6.4 on presentation)  #Myelodysplastic Syndrome  #CAD  #Chronic Hypoxic Respiratory Failure (etiology uncertain)  #RA     Recommendations:  -pt and his wife are unsure who his oncologist is. [facility], [facility] were given [facility] name \"Dr. Arnie Anne,\" but after calling her office, we were told that she was only a locums physician who is on vacation until 6/24/24. We were told that there are currently no MD Oncologists available at the [facility] and both [facility]level providers at the [facility] are on vacation. We will need to call the [facility] on to get more information about pt's previously " reported history of Myelodysplastic Syndrome on Monday, or formally request medical records.     -Likely secondary to pt's MDS  -since this could be worsening MDS or evolving AML, bone marrow biopsy completed  -will await results.       Yeimi Malik MD  Hematology/Oncology     n.

## 2024-06-21 NOTE — CARE PLAN
The patient is Stable - Low risk of patient condition declining or worsening    Shift Goals  Clinical Goals: Patient safety, monitor labs  Patient Goals: NPO at midnight for BMB      Progress made toward(s) clinical / shift goals:     Problem: Knowledge Deficit - Standard  Goal: Patient and family/care givers will demonstrate understanding of plan of care, disease process/condition, diagnostic tests and medications  Outcome: Progressing     Problem: Impaired Gas Exchange  Goal: Patient will demonstrate improved ventilation and adequate oxygenation and participate in treatment regimen within the level of ability/situation.  Outcome: Progressing     Problem: Self Care  Goal: Patient will have the ability to perform ADLs independently or with assistance (bathe, groom, dress, toilet and feed)  Outcome: Progressing     Problem: Fall Risk  Goal: Patient will remain free from falls  Outcome: Progressing

## 2024-06-21 NOTE — DISCHARGE PLANNING
Patient needed assistance with transport home.  RN CM arranged an Uber ride for the patient, trip ID [id number].

## 2024-06-21 NOTE — PROCEDURES
Bone Marrow Biopsy/Aspiration    Date/Time: 6/21/2024 11:34 AM    Performed by: Fer Padilla M.D.  Authorized by: Fer Padilla M.D.    Consent:     Consent obtained:  Verbal    Consent given by:  Patient    Risks discussed:  Bleeding, infection, pain and nerve damage    Alternatives discussed:  No treatment, delayed treatment and alternative treatment  Universal protocol:     Procedure explained and questions answered to patient or proxy's satisfaction: yes      Relevant documents present and verified: yes      Test results available and properly labeled: yes      Required blood products, implants, devices, and special equipment available: no      Immediately prior to procedure a time out was called: yes      Site/side marked: yes      Patient identity confirmed:  Verbally with patient  Pre-procedure details:     Procedure type:  Aspiration and biopsy    Requesting physician:  Dr. PER Sood    Indications:  MDS, anemia    Position:  Prone    Buttock laterality:  Left    Local anesthetic:  1% Lidocaine    Subcutaneous volume:  1 mL    Periosteum anesthetic volume:  4 mL    Preparation: Patient was prepped and draped in usual sterile fashion    Sedation:     Patient Sedated: Yes      Sedation type: moderate (conscious) sedation      Sedation:  Midazolam    Sedation Dosage:  2 mg    Analgesia:  Fentanyl    Analgesia Dosage:  50 mcg    Sedation Start Time:  11:16 PDT    Sedation Stop Time:  11:32 PDT    Moderate sedation charge selection based on time above is:  15 minutes      I was personally present and supervised the patient throughout the entirety of the moderate sedation time mentioned above.  Procedure details:     Aspirate obtained:  5 mL followed by 5 mL    Biopsy performed:  1 core    Number of attempts:  1    Estimated blood loss (mL):  0  Post-procedure:     Puncture site:  Adhesive bandage applied    Patient tolerance of procedure:  Tolerated well, no immediate complications

## 2024-06-21 NOTE — DISCHARGE PLANNING
Patient to transfer to his home 06/22/24 at 1000 via [facility] transport. RSV: [id number]. Cost is $209.50.  HELENE Metcalf advised.    1557  Spoke with Kavya at [facility], transport has been cancelled as per HELENE Metcalf's request.

## 2024-06-21 NOTE — THERAPY
Occupational Therapy   Initial Evaluation     Patient Name: King Alegria  Age:  79 y.o., Sex:  male  Medical Record #: [id number]  Today's Date: 6/21/2024     Precautions: Fall Risk    Assessment    Patient is 79 y.o. male admitted with anemia, pm hx myelodysplastic syndrome awaiting bone marrow biopsy to determine disease status. Pt presents to OT eval reporting feeling below his baseline of activity tolerance but remains functionally independent for self-care ADLs. Supportive spouse able to assist upon DC. Pt is open to home health OT follow up to ensure ADL safety and to recommend any DME to increase safety, pt currently does not use any. Anticipate DC home once medically cleared.     Plan    Occupational Therapy Initial Treatment Plan   Duration: Discharge Needs Only    DC Equipment Recommendations: None  Discharge Recommendations: Recommend home health for continued occupational therapy services     Objective       06/21/24 0945   Prior Living Situation   Prior Services None   Housing / Facility 1 Story House   Bathroom Set up Walk In Shower   Equipment Owned Front-Wheel Walker;Tub / Shower Seat   Lives with - Patient's Self Care Capacity Spouse   Comments has a shower chair but doesn't use it, wife is able to assist but pt hasn't needed assist   Prior Level of ADL Function   Self Feeding Independent   Grooming / Hygiene Independent   Bathing Independent   Dressing Independent   Toileting Independent   Prior Level of IADL Function   Medication Management Independent   Laundry Independent   Kitchen Mobility Independent   Finances Independent   Prior Level Of Mobility Independent Without Device in Home   History of Falls   History of Falls No   Precautions   Precautions Fall Risk   Pain 0 - 10 Group   Therapist Pain Assessment Nurse Notified;Post Activity Pain Same as Prior to Activity;0   Cognition    Cognition / Consciousness WDL   Level of Consciousness Alert   Comments pleasant, cooperative, eager to eat and  go home   Strength Upper Body   Upper Body Strength  WDL   Coordination Upper Body   Coordination WDL   Balance Assessment   Sitting Balance (Static) Fair +   Sitting Balance (Dynamic) Fair +   Standing Balance (Static) Fair   Standing Balance (Dynamic) Fair   Weight Shift Sitting Good   Weight Shift Standing Fair   Comments FWW   Bed Mobility    Supine to Sit Supervised   Sit to Supine Supervised   Scooting Supervised   ADL Assessment   Eating Modified Independent   Grooming Supervision;Standing   Upper Body Dressing Supervision   Lower Body Dressing Minimal Assist   Toileting Supervision   How much help from another person does the patient currently need...   Putting on and taking off regular lower body clothing? 3   Bathing (including washing, rinsing, and drying)? 3   Toileting, which includes using a toilet, bedpan, or urinal? 4   Putting on and taking off regular upper body clothing? 4   Taking care of personal grooming such as brushing teeth? 4   Eating meals? 4   6 Clicks Daily Activity Score 22   Functional Mobility   Sit to Stand Supervised   Bed, Chair, Wheelchair Transfer Contact Guard Assist   Toilet Transfers Supervised   Mobility FWW   Activity Tolerance   Comments functional for self-care ADLs   Education Group   Education Provided Role of Occupational Therapist;Activities of Daily Living;Transfers;Home Safety   Role of Occupational Therapist Patient Response Patient;Acceptance;Explanation;Verbal Demonstration   Home Safety Patient Response Patient;Acceptance;Explanation;Verbal Demonstration   Transfers Patient Response Patient;Acceptance;Explanation;Verbal Demonstration   ADL Patient Response Patient;Acceptance;Explanation;Verbal Demonstration   Occupational Therapy Initial Treatment Plan    Duration Discharge Needs Only   Anticipated Discharge Equipment and Recommendations   DC Equipment Recommendations None   Discharge Recommendations Recommend home health for continued occupational therapy  services

## 2024-06-21 NOTE — PROGRESS NOTES
Discharge order received. PIV removed, tip intact, no issues noted.  Printed discharge instructions given to pt. All discharge education complete, specifically need to f/u with PCP/[facility] and come back through the ED for new or worsening symptoms, all questions and concerns were addressed. Awaiting Uber ride

## 2024-06-21 NOTE — CARE PLAN
The patient is Stable - Low risk of patient condition declining or worsening    Shift Goals  Clinical Goals: Patient safety, monitor labs  Patient Goals: NPO at midnight for BMB  Family Goals: not present    Progress made toward(s) clinical / shift goals:  education done on POC, all questions and concerns were addressed      Problem: Knowledge Deficit - Standard  Goal: Patient and family/care givers will demonstrate understanding of plan of care, disease process/condition, diagnostic tests and medications  Outcome: Progressing     Problem: Nutrition - Advanced  Goal: Patient will display progressive weight gain toward goal have adequate food and fluid intake  Outcome: Progressing       Patient is not progressing towards the following goals:

## 2024-06-21 NOTE — CARE PLAN
The patient is Watcher - Medium risk of patient condition declining or worsening    Shift Goals  Clinical Goals: pt will remain free from fall, pt will have biopsy tomorrow  Patient Goals: rest, get biopsy tomorrow  Family Goals: not present    Progress made toward(s) clinical / shift goals:  Patient is AxO x4 and understands plan of care, all questions answered at this time. Call light and personal belongings are within reach. Pt calls appropriately for nursing needs. Frequent rounding in place. Bed is locked and in lowest position. Pt updated on NPO status for biopsy tomorrow.     Patient is not progressing towards the following goals: NA

## 2024-06-21 NOTE — DISCHARGE SUMMARY
Discharge Summary    CHIEF COMPLAINT ON ADMISSION  Chief Complaint   Patient presents with    Weakness     Patient reports generalized weakness, worsening over the last few weeks.    Shortness of Breath     Patient reports SOB worsening over the last few weeks. Patient wears 2L O2 at baseline       Reason for Admission  EMS     Admission Date  6/18/2024    CODE STATUS  Prior    HPI & HOSPITAL COURSE    King Alegria is a 79-year-old male with PMHx CAD, COPD with chronic hypoxia, continuous supplemental O2 2 L/min, RA, HTN, HLD, MDS/aplastic anemia [facility].  Admitted 6/18 for worsening shortness of breath.    Hemoglobin on admission was 6.4.  Patient received 1 unit RBCs in the emergency room.    Case was discussed with Dr. Malik - [facility].  Recommending bone marrow biopsy during this hospital admission.  Bone marrow biopsy was completed on 6/21.  Patient did not require any additional red blood cell transfusions.    Additionally during this hospitalization-patient's systolic blood pressure ranged in the 90s to low 100s.  I would recommend discontinuing atenolol and Imdur at this time.  Patient has had significant weight loss over the last few months and this is likely affected his antihypertensive needs.  He has been feeling lightheaded at home.  Low blood pressure could also be contributing.    PT OT consulted prior to discharge.  Recommending home health.  This has been arranged.  Patient will discharge home to his wife.  He will follow-up with [facility] for bone marrow biopsy results and possible treatment planning.    Therefore, he is discharged in fair and stable condition to home with organized home healthcare and close outpatient follow-up.    The patient met 2-midnight criteria for an inpatient stay at the time of discharge.    Discharge Date  6/21/2024    FOLLOW UP ITEMS POST DISCHARGE  Follow-up with [facility]  Follow-up with [facility] 3 to [facility]  days    DISCHARGE DIAGNOSES  Principal Problem:    Symptomatic anemia (POA: Yes)  Active Problems:    COPD (chronic obstructive pulmonary disease) (HCC) (POA: Yes)    MDS (myelodysplastic syndrome) (HCC) (POA: Unknown)    Severe protein-calorie malnutrition (HCC) (POA: Unknown)    Acquired hypothyroidism (POA: Unknown)    Hypotension (POA: Unknown)  Resolved Problems:    * No resolved hospital problems. *      FOLLOW UP  Future Appointments   Date Time Provider Department Center   6/25/2024 10:00 AM Diallo Ariza M.D. [facility] None     [facility]  [address]  [place] [id number]  [phone]  Follow up in 1 week(s)      Pcp Not In Computer            MEDICATIONS ON DISCHARGE     Medication List        CONTINUE taking these medications        Instructions   atorvastatin 80 MG tablet  Commonly known as: Lipitor   Take 80 mg by mouth every evening.  Dose: 80 mg     cycloSPORINE 0.05 % ophthalmic emulsion  Commonly known as: Restasis   Doctor's comments: Single use preservative free formulation. Please give one months supply with 6 refills  Administer 1 Drop into both eyes 2 times a day.  Dose: 1 Drop     docusate calcium 240 MG Caps  Commonly known as: Surfak   Take 240 mg by mouth at bedtime.  Dose: 240 mg     levothyroxine 125 MCG Tabs  Commonly known as: Synthroid   Take 125 mcg by mouth every morning on an empty stomach.  Dose: 125 mcg     oxybutynin SR 5 MG Tb24  Commonly known as: Ditropan-XL   Take 5 mg by mouth every day.  Dose: 5 mg     potassium chloride SA 20 MEQ Tbcr  Commonly known as: Kdur   Take 20 mEq by mouth 2 times a day.  Dose: 20 mEq     tamsulosin 0.4 MG capsule  Commonly known as: Flomax   Take 0.4 mg by mouth every morning.  Dose: 0.4 mg            STOP taking these medications      atenolol 25 MG Tabs  Commonly known as: Tenormin     isosorbide mononitrate SR 30 MG Tb24  Commonly known as: Imdur              Allergies  No Known  Allergies    DIET  No orders of the defined types were placed in this encounter.      ACTIVITY  As tolerated.  Weight bearing as tolerated    CONSULTATIONS  Oncology   PT OT     PROCEDURES  Bone Marrow Biopsy 6/21    LABORATORY  Lab Results   Component Value Date    SODIUM 138 06/21/2024    POTASSIUM 4.1 06/21/2024    CHLORIDE 104 06/21/2024    CO2 21 06/21/2024    GLUCOSE 90 06/21/2024    BUN 13 06/21/2024    CREATININE 0.62 06/21/2024        Lab Results   Component Value Date    WBC 3.9 (L) 06/21/2024    HEMOGLOBIN 7.6 (L) 06/21/2024    HEMATOCRIT 22.7 (L) 06/21/2024    PLATELETCT 66 (L) 06/21/2024        Total time of the discharge process exceeds 52 minutes.

## 2024-06-22 DIAGNOSIS — D46.9 MDS (MYELODYSPLASTIC SYNDROME) (HCC): ICD-10-CM

## 2024-06-22 NOTE — THERAPY
Physical Therapy   Initial Evaluation     Patient Name: King Alegria  Age:  79 y.o., Sex:  male  Medical Record #: [id number]  Today's Date: 6/21/2024     Precautions  Precautions: Fall Risk    Assessment  Pt presents with impaired activity tolerance associated with chief complaint of weakness and SOB in setting of MDS, symptomatic anemia, and COPD now s/p bone biopsy; pt able to demonstrate independent mobility required to return home; no equipment; he does need a ride home, RN aware; will follow at low frequency to ensure dc;     Plan    Physical Therapy Initial Treatment Plan   Treatment Plan : Bed Mobility, Equipment, Manual Therapy, Gait Training, Neuro Re-Education / Balance, Stair Training, Self Care / Home Evaluation, Therapeutic Activities, Therapeutic Exercise  Treatment Frequency: 1 Time per Week  Duration: Until Therapy Goals Met    DC Equipment Recommendations: None  Discharge Recommendations: Anticipate that the patient will have no further physical therapy needs after discharge from the hospital       Abridged Subjective/Objective     06/21/24 1510   Prior Living Situation   Prior Services None   Housing / Facility 1 Story House   Steps Into Home 0   Steps In Home 0   Equipment Owned Front-Wheel Walker   Lives with - Patient's Self Care Capacity Spouse   Comments denies falls; denies concern for home;   Prior Level of Functional Mobility   Bed Mobility Independent   Transfer Status Independent   Ambulation Independent   Ambulation Distance to tolerance   Assistive Devices Used None   Cognition    Cognition / Consciousness WDL   Level of Consciousness Alert   Comments pleasant and cooperative ready for home;   Passive ROM Lower Body   Passive ROM Lower Body WDL   Strength Lower Body   Lower Body Strength  WDL   Sensation Lower Body   Lower Extremity Sensation   WDL   Balance Assessment   Sitting Balance (Static) Good   Sitting Balance (Dynamic) Good   Standing Balance (Static) Fair +   Standing Balance  (Dynamic) Fair   Weight Shift Sitting Good   Weight Shift Standing Good   Comments no UE support in sitting/standing   Bed Mobility    Supine to Sit Supervised   Sit to Supine Supervised   Gait Analysis   Gait Level Of Assist Supervised   Assistive Device None   Distance (Feet) 200   # of Times Distance was Traveled 1   Weight Bearing Status full   Vision Deficits Impacting Mobility pinpoint pupils but denies changes   Comments distance limited by therapist; pt denies issues or concerns   Functional Mobility   Sit to Stand Supervised   Bed, Chair, Wheelchair Transfer Supervised   Short Term Goals    Short Term Goal # 1 Pt will demonstrate independence with HEP aimed at aerobic exercise within 6 visits to ensure independent mobility at home.   Education Group   Role of Physical Therapist Patient Response Patient;Acceptance;Explanation;Demonstration;Action Demonstration;Verbal Demonstration   Additional Comments aerobic exercise-goal of 30 mins; protein in diet

## 2024-06-25 ENCOUNTER — OFFICE VISIT (OUTPATIENT)
Dept: OPHTHALMOLOGY | Facility: MEDICAL CENTER | Age: 79
End: 2024-06-25
Payer: COMMERCIAL

## 2024-06-25 DIAGNOSIS — E07.9 THYROID ORBITOPATHY: ICD-10-CM

## 2024-06-25 DIAGNOSIS — H05.89 THYROID ORBITOPATHY: ICD-10-CM

## 2024-06-25 DIAGNOSIS — H40.003 GLAUCOMA SUSPECT OF BOTH EYES: ICD-10-CM

## 2024-06-25 DIAGNOSIS — H49.9 OPHTHALMOPLEGIA: ICD-10-CM

## 2024-06-25 DIAGNOSIS — H35.30 ARMD (AGE RELATED MACULAR DEGENERATION): ICD-10-CM

## 2024-06-25 DIAGNOSIS — H35.352 CYSTOID MACULAR EDEMA OF LEFT EYE: ICD-10-CM

## 2024-06-25 ASSESSMENT — CONF VISUAL FIELD
OS_INFERIOR_NASAL_RESTRICTION: 0
OS_SUPERIOR_TEMPORAL_RESTRICTION: 0
OD_SUPERIOR_TEMPORAL_RESTRICTION: 0
OD_NORMAL: 1
OS_INFERIOR_TEMPORAL_RESTRICTION: 0
OD_INFERIOR_TEMPORAL_RESTRICTION: 0
OD_SUPERIOR_NASAL_RESTRICTION: 0
OS_NORMAL: 1
OD_INFERIOR_NASAL_RESTRICTION: 0
OS_SUPERIOR_NASAL_RESTRICTION: 0

## 2024-06-25 ASSESSMENT — VISUAL ACUITY
OD_SC+: -2
METHOD: SNELLEN - LINEAR
OD_SC: 20/25
CORRECTION_TYPE: GLASSES
OS_SC+: -1
OS_SC: 20/30

## 2024-06-25 ASSESSMENT — ENCOUNTER SYMPTOMS: BLURRED VISION: 1

## 2024-06-25 ASSESSMENT — SLIT LAMP EXAM - LIDS
COMMENTS: NORMAL
COMMENTS: NORMAL

## 2024-06-25 ASSESSMENT — TONOMETRY
OS_IOP_MMHG: 9
IOP_METHOD: I-CARE
OD_IOP_MMHG: 11

## 2024-06-25 ASSESSMENT — REFRACTION_MANIFEST
OD_SPHERE: -0.25
OS_AXIS: 086
OD_AXIS: 067
OS_CYLINDER: +1.75
OS_SPHERE: -1.00
METHOD_AUTOREFRACTION: 1
OD_CYLINDER: +0.50

## 2024-06-25 ASSESSMENT — EXTERNAL EXAM - RIGHT EYE: OD_EXAM: PROPTOSIS

## 2024-06-25 ASSESSMENT — EXTERNAL EXAM - LEFT EYE: OS_EXAM: PROPTOSIS

## 2024-06-25 ASSESSMENT — CUP TO DISC RATIO
OS_RATIO: 0.2
OD_RATIO: 0.2

## 2024-06-25 NOTE — ASSESSMENT & PLAN NOTE
11/15/2023 - IOP stable. OCT NFL thickness 88 OD and 87 OS  1/25/2024-IOP stable.  OCT neurofibrillary thickness 86 OD 82 OS  5/7/2024-no progressive cupping.  OCT neurofibrillary thickness 80 OD 79 OS  6/25/2024-IOP stable.  Stable OCT neurofibrillary thickness 79 OD 76 OS

## 2024-06-25 NOTE — PROGRESS NOTES
Peds/Neuro Ophthalmology:   Diallo Ariza M.D.    Date & Time note created:    6/25/2024   1:15 PM     Referring MD / APRN:  Pcp Not In Computer, No att. providers found    Patient ID:  Name:             King Alegria   YOB: 1945  Age:                 79 y.o.  male   MRN:               [id number]    Chief Complaint/Reason for Visit:     Other (New pt eval for ARMD)      History of Present Illness:    King Alegria is a 79 y.o. male   1 month f.v. for poor vision after Tepezza. Pt states he still has dry eyes, but no longer has any other pain or discomfort. Pt feels that after his last does of Tepezza his vision is still poor. He states he notices the poor vision more in the early mornings. Pt states he needs a refill on medicated eye drops.     Other        Review of Systems:  Review of Systems   Eyes:  Positive for blurred vision.        Dry eyes OU   All other systems reviewed and are negative.      Past Medical History:   Past Medical History:   Diagnosis Date    Arthritis     Chronic respiratory failure with hypoxia ([facility]) 09/15/2023    Uses 2lpm at rest, 3lpm with ambulation and at night    COPD (chronic obstructive pulmonary disease) ([facility])     Former smoker 09/15/2023    Heart attack ([facility]) 1998    Heart attack ([facility]) 1999    Had stents placed last month    Hyperlipidemia     Hypertension     Lung nodule 09/15/2023    Pancytopenia ([facility]) 09/15/2023    Prostate cancer ([facility]) 2 years ago    Had seeds    Rheumatoid arthritis ([facility]) 12/15/2015       Past Surgical History:  Past Surgical History:   Procedure Laterality Date    MT DX BONE MARROW BIOPSIES Left 6/21/2024    Procedure: BIOPSY, BONE MARROW, USING NEEDLE OR TROCAR - KING;  Surgeon: Fer Padilla M.D.;  Location: SURGERY SAME DAY [facility];  Service: Orthopedics    MT DX BONE MARROW ASPIRATIONS Left 6/21/2024    Procedure: ASPIRATION, BONE MARROW;  Surgeon: Fer Padilla M.D.;  Location: SURGERY SAME DAY [facility];   Service: Orthopedics       Current Outpatient Medications:  Current Outpatient Medications   Medication Sig Dispense Refill    levothyroxine (SYNTHROID) 125 MCG Tab Take 125 mcg by mouth every morning on an empty stomach.      oxybutynin SR (DITROPAN-XL) 5 MG TABLET SR 24 HR Take 5 mg by mouth every day.      cycloSPORINE (RESTASIS) 0.05 % ophthalmic emulsion Administer 1 Drop into both eyes 2 times a day. 30 Each 6    potassium chloride SA (KDUR) 20 MEQ Tab CR Take 20 mEq by mouth 2 times a day.      docusate calcium (SURFAK) 240 MG Cap Take 240 mg by mouth at bedtime.      atorvastatin (LIPITOR) 80 MG tablet Take 80 mg by mouth every evening.      tamsulosin (FLOMAX) 0.4 MG capsule Take 0.4 mg by mouth every morning.       No current facility-administered medications for this visit.       Allergies:  No Known Allergies    Family History:  Family History   Problem Relation Age of Onset    Cancer Mother        Social History:  Social History     Socioeconomic History    Marital status:      Spouse name: Not on file    Number of children: Not on file    Years of education: Not on file    Highest education level: Not on file   Occupational History    Not on file   Tobacco Use    Smoking status: Former     Current packs/day: 0.00     Average packs/day: 2.0 packs/day for 50.0 years (100.0 ttl pk-yrs)     Types: Cigarettes     Start date: 11/30/1963     Quit date: 11/30/2013     Years since quitting: 10.5     Passive exposure: Never    Smokeless tobacco: Never   Vaping Use    Vaping status: Never Used   Substance and Sexual Activity    Alcohol use: No     Comment: ETOH abue in the past    Drug use: No    Sexual activity: Not on file   Other Topics Concern    Not on file   Social History Narrative    Retired     Social Determinants of Health     Financial Resource Strain: Low Risk  (9/14/2023)    Received from [facility]    Overall Financial Resource Strain (CARDIA)     Difficulty of Paying Living  Expenses: Not hard at all   Food Insecurity: No Food Insecurity (6/19/2024)    Hunger Vital Sign     Worried About Running Out of Food in the Last Year: Never true     Ran Out of Food in the Last Year: Never true   Transportation Needs: No Transportation Needs (6/19/2024)    PRAPARE - Transportation     Lack of Transportation (Medical): No     Lack of Transportation (Non-Medical): No   Physical Activity: Not on file   Stress: Not on file   Social Connections: Not on file   Intimate Partner Violence: Not At Risk (6/19/2024)    Humiliation, Afraid, Rape, and Kick questionnaire     Fear of Current or Ex-Partner: No     Emotionally Abused: No     Physically Abused: No     Sexually Abused: No   Housing Stability: Unknown (6/19/2024)    Housing Stability Vital Sign     Unable to Pay for Housing in the Last Year: No     Number of Places Lived in the Last Year: Not on file     Unstable Housing in the Last Year: No          Physical Exam:  Physical Exam    Oriented x 3  Weight/BMI: There is no height or weight on file to calculate BMI.  There were no vitals taken for this visit.    Base Eye Exam       Visual Acuity (Snellen - Linear)         Right Left    Dist sc 20/25 -2 20/30 -1      Correction: Glasses              Tonometry (i-care, 10:11 AM)         Right Left    Pressure 11 9              Pupils         Pupils    Right PERRL    Left PERRL              Visual Fields         Right Left     Full Full              Neuro/Psych       Oriented x3: Yes    Mood/Affect: Normal                  Additional Tests       Stereo       Fly: -    Animals: 0/3    Circles: 0/9                  Strabismus Exam         -2 -2 -2   -3 -3 -3                       0  -2   0  -2                       0 0 0   0 0 0                       Slit Lamp and Fundus Exam       External Exam         Right Left    External Proptosis Proptosis              Slit Lamp Exam         Right Left    Lids/Lashes Normal Normal    Conjunctiva/Sclera White and quiet  White and quiet    Cornea Clear Clear    Anterior Chamber Deep and quiet Deep and quiet    Iris Round and reactive Round and reactive    Lens Clear Clear    Vitreous Normal Normal              Fundus Exam         Right Left    Disc Normal Normal    C/D Ratio 0.2 0.2    Macula Drusen Drusen    Vessels Normal Normal    Periphery Normal Normal                  Refraction       Manifest Refraction (Auto)         Sphere Cylinder Axis    Right -0.25 +0.50 067    Left -1.00 +1.75 086                    Pertinent Lab/Test/Imaging Review:      Assessment and Plan:     Ophthalmoplegia  11/15/2023 - continued proptosis and bilateral elevation deficit, abduction and adduction deficit of the left eye secondary to VICKIE  12/5/2023 - Improved on the prednisone. Decrease to 5.0 mg. Awaiting approval for the tepezza  1/25/2024-overall stable  5/7/2024-off the prednisone.  Status post 4 infusions of Tepezza.  stable adduction deficit and elevation deficit of the right eye stable abduction elevation deficit of the left eye  6/25/204 -significant improvement in the proptosis.  Still has bilateral elevation and abduction deficits, however overall improved following Tepezza.  Off of steroids.  No evidence of significant recurrence of inflammatory component.  Orthotropic in primary position.  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  Possibly secondary to the steroids and institution of Tepezza   5/7/2024-overall stable  6/25/2024-improved    Glaucoma suspect of both eyes  11/15/2023 - IOP stable. OCT NFL thickness 88 OD and 87 OS  1/25/2024-IOP stable.  OCT neurofibrillary thickness 86 OD 82 OS  5/7/2024-no progressive cupping.  OCT neurofibrillary thickness 80 OD 79 OS  6/25/2024-IOP stable.  Stable OCT neurofibrillary thickness 79 OD 76 OS    Thyroid  orbitopathy  11/9/2023-given the current findings of bilateral severe proptosis with chemosis lid swelling conjunctival redness and some ocular discomfort, most likely diagnosis is acute thyroid orbitopathy.  Francois score of 7.0 plus 2.  This includes spontaneous orbital pain, gaze evoked orbital pain, eyelid swelling, eyelid erythema, conjunctival redness, chemosis, inflammation of caruncle or plica.  As well as significant increased proptosis and decrease in immune movements.  Given the level of proptosis would recommend starting 1 g Solu-Medrol intravenous daily x3 then oral steroid taper.  Recommend obtaining thyroid antibodies as well as TSH receptor antibody.  May need to get oculoplastic surgery involved.  Current emergent standard treatment now includes teprotumumab.  This is an intravenous infusion that would need to be approved and given through a infusion center.  We will contact the patient access liaison at [facility]  11/15/2023 - Overall significant improvement however sill motility disturbance and proptosis. On steroid taper. Prednisone 60 mg, DAILY Starting Mon 11/13/2023, For 3 days, THEN 50 mg, DAILY Starting Thu 11/16/2023, For 3 days, THEN 40 mg, DAILY Starting Sun 11/19/2023, For 3 days, THEN 30 mg, DAILY Starting Wed 11/22/2023, For 3 days, THEN 20 mg, DAILY Starting Sat 11/25/2023, For 3 days, THEN 10 mg, DAILY Starting Tue 11/28/2023, For 3 days hold at 10 mg per day. [facility] function and as well to initiate teprotumumab. Follow up in two weeks.  12/5/2023 - Much less chemosis, but still elevation, depression deficit and abduction deficit of the left eye. Also lid retraction. Will decrease prednisone to 5.0 mg. Awaiting approval for Tepezza  1/9/2024-recent readmission for progressive proptosis and ocular discomfort.  Was given steroids.  Then discharged on Medrol Dosepak.  States that he has improved somewhat but still significant lid erythema and  motility disturbance.  States got an infusion Friday and is post to get again tomorrow, but uncertain if this was the Tepezza.  Therefore we will go back on oral prednisone at 40 mg a day because it appears that is steroid sensitive.  I did review the CT scan on admission dated 12/27/2023.  This demonstrated significant enlargement of the extraocular muscles with normal-appearing tendon still consistent with thyroid orbitopathy.  1/25/2024 -tomorrow is second dose of Tepezza.  Overall with the combination of oral steroids significant improvement.  Less chemosis, proptosis and injection.  Still has some restrictive orbitopathy.  IOP stable.  Will continue to monitor  5/7/2024-overall significant improvement although still does have the extraocular motility abnormality.  In primary position is orthotropic.  Is off prednisone.  Status post for Tepezza infusions.  6/25/2024-overall doing well following Tepezza.  Off of prednisone.  Will monitor        Diallo Ariza M.D.

## 2024-06-25 NOTE — ASSESSMENT & PLAN NOTE
11/15/2023 - CME left eye seen on OCT  1/25/2024-some slight improvement.  Possibly secondary to the steroids and institution of Tepezza   5/7/2024-overall stable  6/25/2024-improved

## 2024-06-25 NOTE — ASSESSMENT & PLAN NOTE
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  Francois score of 7.0 plus 2.  This includes spontaneous orbital pain, gaze evoked orbital pain, eyelid swelling, eyelid erythema, conjunctival redness, chemosis, inflammation of caruncle or plica.  As well as significant increased proptosis and decrease in immune movements.  Given the level of proptosis would recommend starting 1 g Solu-Medrol intravenous daily x3 then oral steroid taper.  Recommend obtaining thyroid antibodies as well as TSH receptor antibody.  May need to get oculoplastic surgery involved.  Current emergent standard treatment now includes teprotumumab.  This is an intravenous infusion that would need to be approved and given through a infusion center.  We will contact the patient access liaison at [facility]  11/15/2023 - Overall significant improvement however sill motility disturbance and proptosis. On steroid taper. Prednisone 60 mg, DAILY Starting Mon 11/13/2023, For 3 days, THEN 50 mg, DAILY Starting Thu 11/16/2023, For 3 days, THEN 40 mg, DAILY Starting Sun 11/19/2023, For 3 days, THEN 30 mg, DAILY Starting Wed 11/22/2023, For 3 days, THEN 20 mg, DAILY Starting Sat 11/25/2023, For 3 days, THEN 10 mg, DAILY Starting Tue 11/28/2023, For 3 days hold at 10 mg per day. [facility] function and as well to initiate teprotumumab. Follow up in two weeks.  12/5/2023 - Much less chemosis, but still elevation, depression deficit and abduction deficit of the left eye. Also lid retraction. Will decrease prednisone to 5.0 mg. Awaiting approval for Tepezza  1/9/2024-recent readmission for progressive proptosis and ocular discomfort.  Was given steroids.  Then discharged on Medrol Dosepak.  States that he has improved somewhat but still significant lid erythema and motility  disturbance.  States got an infusion Friday and is post to get again tomorrow, but uncertain if this was the Tepezza.  Therefore we will go back on oral prednisone at 40 mg a day because it appears that is steroid sensitive.  I did review the CT scan on admission dated 12/27/2023.  This demonstrated significant enlargement of the extraocular muscles with normal-appearing tendon still consistent with thyroid orbitopathy.  1/25/2024 -tomorrow is second dose of Tepezza.  Overall with the combination of oral steroids significant improvement.  Less chemosis, proptosis and injection.  Still has some restrictive orbitopathy.  IOP stable.  Will continue to monitor  5/7/2024-overall significant improvement although still does have the extraocular motility abnormality.  In primary position is orthotropic.  Is off prednisone.  Status post for Tepezza infusions.  6/25/2024-overall doing well following Tepezza.  Off of prednisone.  Will monitor

## 2024-06-25 NOTE — ASSESSMENT & PLAN NOTE
11/15/2023 - continued proptosis and bilateral elevation deficit, abduction and adduction deficit of the left eye secondary to VICKIE  12/5/2023 - Improved on the prednisone. Decrease to 5.0 mg. Awaiting approval for the tepezza  1/25/2024-overall stable  5/7/2024-off the prednisone.  Status post 4 infusions of Tepezza.  stable adduction deficit and elevation deficit of the right eye stable abduction elevation deficit of the left eye  6/25/204 -significant improvement in the proptosis.  Still has bilateral elevation and abduction deficits, however overall improved following Tepezza.  Off of steroids.  No evidence of significant recurrence of inflammatory component.  Orthotropic in primary position.  Will monitor

## 2024-10-29 ENCOUNTER — APPOINTMENT (OUTPATIENT)
Dept: OPHTHALMOLOGY | Facility: MEDICAL CENTER | Age: 79
End: 2024-10-29
Payer: COMMERCIAL

## (undated) DEVICE — TUBING CLEARLINK DUO-VENT - C-FLO (48EA/CA)

## (undated) DEVICE — SPONGE GAUZESTER 4 X 4 4PLY - (128PK/CA)

## (undated) DEVICE — BANDAID SHEER STRIP 3/4 IN (100EA/BX 12BX/CA)

## (undated) DEVICE — SET LEADWIRE 5 LEAD BEDSIDE DISPOSABLE ECG (1SET OF 5/EA)

## (undated) DEVICE — SOD. CHL 10CC SYRINGE PREFILL - W/10 CC (30/BX)

## (undated) DEVICE — CANNULA DIVIDED ADULT CO2 - SAMPLE W/FEMALE CONNCT (25/CA)

## (undated) DEVICE — SYRINGE NON SAFETY 5 CC 20 GA X 1-1/2 IN (100/BX 4BX/CA)

## (undated) DEVICE — TOWEL STOP TIMEOUT SAFETY FLAG (40EA/CA)

## (undated) DEVICE — SENSOR OXIMETER ADULT SPO2 RD SET (20EA/BX)

## (undated) DEVICE — ELECTRODE 850 FOAM ADHESIVE - HYDROGEL RADIOTRNSPRNT (50/PK)

## (undated) DEVICE — LACTATED RINGERS INJ 1000 ML - (14EA/CA 60CA/PF)

## (undated) DEVICE — SYRINGE 3 CC 22 GA X 1-1/2 - NDL SAFETY (50/BX 8BX/CA)